# Patient Record
Sex: MALE | Race: WHITE | URBAN - METROPOLITAN AREA
[De-identification: names, ages, dates, MRNs, and addresses within clinical notes are randomized per-mention and may not be internally consistent; named-entity substitution may affect disease eponyms.]

---

## 2017-01-06 ENCOUNTER — OFFICE VISIT (OUTPATIENT)
Dept: FAMILY MEDICINE CLINIC | Age: 47
End: 2017-01-06

## 2017-01-06 VITALS
SYSTOLIC BLOOD PRESSURE: 110 MMHG | OXYGEN SATURATION: 97 % | TEMPERATURE: 95 F | HEART RATE: 96 BPM | WEIGHT: 138 LBS | RESPIRATION RATE: 18 BRPM | DIASTOLIC BLOOD PRESSURE: 85 MMHG

## 2017-01-06 DIAGNOSIS — G80.9 CEREBRAL PALSY, UNSPECIFIED TYPE (HCC): Primary | ICD-10-CM

## 2017-01-06 DIAGNOSIS — G40.909 SEIZURE DISORDER (HCC): ICD-10-CM

## 2017-01-06 NOTE — MR AVS SNAPSHOT
Visit Information Date & Time Provider Department Dept. Phone Encounter #  
 1/6/2017  3:30 PM Nichol Kim MD [address] [place] [address] [phone] [id number] Follow-up Instructions Return in about 3 months (around 4/6/2017). Follow-up and Disposition History Upcoming Health Maintenance Date Due DTaP/Tdap/Td series (1 - Tdap) 11/11/1991 Allergies as of 1/6/2017  Review Complete On: 1/6/2017 By: Nichol Kim MD  
  
 Severity Noted Reaction Type Reactions Avelox [Moxifloxacin]  07/01/2016    Seizures Lamictal [Lamotrigine]  07/01/2016    Rash Lidocaine  07/01/2016    Other (comments) Lyrica [Pregabalin]  07/01/2016    Other (comments) Valproic Acid  07/01/2016    Other (comments) Current Immunizations  Never Reviewed Name Date Influenza High Dose Vaccine PF 9/7/2016 11:21 AM  
  
 Not reviewed this visit You Were Diagnosed With   
  
 Codes Comments Cerebral palsy, unspecified type ([facility]    -  Primary ICD-10-CM: G80.9 ICD-9-CM: 343.9 Seizure disorder ([facility].)     ICD-10-CM: G40.909 ICD-9-CM: 345.90 Vitals BP Pulse Temp Resp Weight(growth percentile) SpO2  
 110/85 (BP 1 Location: Right arm, BP Patient Position: At rest) 96 95 °F (35 °C) (Axillary) 18 138 lb (62.6 kg) 97% Smoking Status Never Smoker Preferred Pharmacy Pharmacy Name Phone [address] [place], [address] [place] [address] Your Updated Medication List  
  
   
This list is accurate as of: 1/6/17  3:44 PM.  Always use your most recent med list.  
  
  
  
  
 BABY OIL (MINERAL OIL) EX  
by Apply Externally route. baclofen 20 mg tablet Commonly known as:  LIORESAL Take 20 mg by mouth three (3) times daily. calcitonin (salmon) nasal  
Commonly known as:  MIACALCIN  
1 [place] by IntraNASal route daily. calcium-vitamin D 500 mg(1,250mg) -200 unit per tablet Commonly known as:  OYSTER SHELL Take 1 Tab by mouth two (2) times daily (with meals). diazePAM 10 mg tablet Commonly known as:  VALIUM Take 1 hour before dental procedure  
  
 folic acid-vit E0-CBR B41 2.5-25-2 mg tablet Commonly known as:  Bernett Nissen Take 1 Tab by mouth daily. lacosamide 200 mg Tab tablet Commonly known as:  VIMPAT Take 200 mg by mouth two (2) times a day. levETIRAcetam 100 mg/mL solution Commonly known as:  KEPPRA Take 10 mg/kg by mouth two (2) times a day. LISTERINE MM  
by Mucous Membrane route. LORazepam 2 mg/mL concentrated solution Commonly known as:  INTENSOL Take 1 mg by mouth every eight (8) hours as needed for Anxiety. Menthol-Zinc Oxide 0.44-20.6 % Oint Commonly known as:  Calmoseptine Apply  to affected area. multivit-min-FA-lycopen-lutein 0.4-300-250 mg-mcg-mcg Tab Commonly known as:  CENTRUM SILVER Take 1 Tab by mouth daily. Crushed via G-tube OTHER Stander for spine alignment Strap for toe straightening  
  
 phenytoin 50 mg chewable tablet Commonly known as:  DILANTIN Take  by mouth three (3) times daily. sorbitol 70 % solution Take 15 mL by mouth daily as needed. THERAVITE PO Take  by mouth. Follow-up Instructions Return in about 3 months (around 4/6/2017). Introducing [facility]! Jordon Craig introduces [employer] patient portal. Now you can access parts of your medical record, email your doctor's office, and request medication refills online. 1. In your internet browser, go to [email or web address] 2. Click on the First Time User? Click Here link in the Sign In box. You will see the New Member Sign Up page. 3. Enter your [employer] Access Code exactly as it appears below. You will not need to use this code after youve completed the sign-up process. If you do not sign up before the expiration date, you must request a new code. · [employer] Access Code: [email or web address] Expires: 4/6/2017  3:44 PM 
 
4. Enter the last four digits of your Social Security Number (xxxx) and Date of Birth (mm/dd/yyyy) as indicated and click Submit. You will be taken to the next sign-up page. 5. Create a [employer] ID. This will be your [employer] login ID and cannot be changed, so think of one that is secure and easy to remember. 6. Create a [employer] password. You can change your password at any time. 7. Enter your Password Reset Question and Answer. This can be used at a later time if you forget your password. 8. Enter your e-mail address. You will receive e-mail notification when new information is available in [address] 9. Click Sign Up. You can now view and download portions of your medical record. 10. Click the Download Summary menu link to download a portable copy of your medical information. If you have questions, please visit the Frequently Asked Questions section of the [employer] website. Remember, [employer] is NOT to be used for urgent needs. For medical emergencies, dial 911. Now available from your iPhone and Android! Please provide this summary of care documentation to your next provider. If you have any questions after today's visit, please call [phone].

## 2017-01-06 NOTE — PROGRESS NOTES
HISTORY OF PRESENT ILLNESS  Ryan Landers is a 55 y.o. male. HPI  CP stable   seizures stable  Review of Systems   Neurological: Positive for focal weakness and seizures. All other systems reviewed and are negative. Past Medical History   Diagnosis Date    Cerebral palsy ([address]     Scoliosis     Seizures ([facility]     Thyroid disease      Current Outpatient Prescriptions on File Prior to Visit   Medication Sig Dispense Refill    diazepam (VALIUM) 10 mg tablet Take 1 hour before dental procedure 1 Tab 0    OTHER Stander for spine alignment  Strap for toe straightening 1 Each 0    multivit-min-FA-lycopen-lutein (CENTRUM SILVER) 0.4-300-250 mg-mcg-mcg tab Take 1 Tab by mouth daily. Crushed via G-tube 100 Tab 3    levETIRAcetam (KEPPRA) 100 mg/mL solution Take 10 mg/kg by mouth two (2) times a day.  MULTIVITAMIN,THERAPEUTIC (THERAVITE PO) Take  by mouth.  sorbitol 70 % solution Take 15 mL by mouth daily as needed.  phenytoin (DILANTIN) 50 mg tablet Take  by mouth three (3) times daily.  baclofen (LIORESAL) 20 mg tablet Take 20 mg by mouth three (3) times daily.  calcium-vitamin D (OYSTER SHELL) 500 mg(1,250mg) -200 unit per tablet Take 1 Tab by mouth two (2) times daily (with meals).  folic acid-vit X7-J A57 (FOLTX) 2.5-25-2 mg tablet Take 1 Tab by mouth daily.  calcitonin, salmon, (MIACALCIN) nasal 1 [place] by IntraNASal route daily.  lacosamide (VIMPAT) 200 mg tab tablet Take 200 mg by mouth two (2) times a day.  LORazepam (INTENSOL) 2 mg/mL concentrated solution Take 1 mg by mouth every eight (8) hours as needed for Anxiety.  Menthol-Zinc Oxide (CALMOSEPTINE) 0.44-20.6 % oint Apply  to affected area.  EUCALYP/ME-SALICYLATE/MEN/THYM (LISTERINE MM) by Mucous Membrane route.  BABY OIL, MINERAL OIL, EX by Apply Externally route. No current facility-administered medications on file prior to visit.       Visit Vitals    /85 (BP 1 Location: Right arm, BP Patient Position: At rest)    Pulse 96    Temp 95 °F (35 °C) (Axillary)    Resp 18    Wt 138 lb (62.6 kg)    SpO2 97%         Physical Exam   Constitutional: He appears well-developed and well-nourished. No distress. Musculoskeletal: He exhibits deformity. He exhibits no edema or tenderness. Neurological: He displays abnormal reflex. A cranial nerve deficit is present. He exhibits abnormal muscle tone. Chronic neurologic deficits   Skin: He is not diaphoretic. Vitals reviewed.       ASSESSMENT and PLAN  CP   Seizures  Plan  Continue current rx  Recheck in 3 months

## 2017-02-23 ENCOUNTER — TELEPHONE (OUTPATIENT)
Dept: FAMILY MEDICINE CLINIC | Age: 47
End: 2017-02-23

## 2017-04-04 ENCOUNTER — TELEPHONE (OUTPATIENT)
Dept: FAMILY MEDICINE CLINIC | Age: 47
End: 2017-04-04

## 2017-04-04 ENCOUNTER — OFFICE VISIT (OUTPATIENT)
Dept: FAMILY MEDICINE CLINIC | Age: 47
End: 2017-04-04

## 2017-04-04 VITALS
HEART RATE: 85 BPM | SYSTOLIC BLOOD PRESSURE: 96 MMHG | RESPIRATION RATE: 20 BRPM | TEMPERATURE: 97.7 F | OXYGEN SATURATION: 95 % | DIASTOLIC BLOOD PRESSURE: 64 MMHG | WEIGHT: 126.8 LBS

## 2017-04-04 DIAGNOSIS — R56.9 SEIZURES (HCC): Primary | ICD-10-CM

## 2017-04-04 DIAGNOSIS — E03.9 ACQUIRED HYPOTHYROIDISM: ICD-10-CM

## 2017-04-04 RX ORDER — DIAZEPAM 10 MG/1
TABLET ORAL
Qty: 1 TAB | Refills: 3 | Status: SHIPPED | OUTPATIENT
Start: 2017-04-04 | End: 2017-04-04 | Stop reason: SDUPTHER

## 2017-04-04 RX ORDER — LEVOTHYROXINE SODIUM 25 UG/1
TABLET ORAL
COMMUNITY
End: 2017-09-26 | Stop reason: SDUPTHER

## 2017-04-04 RX ORDER — DIAZEPAM 10 MG/1
TABLET ORAL
Qty: 1 TAB | Refills: 3 | Status: SHIPPED | OUTPATIENT
Start: 2017-04-04 | End: 2017-09-22 | Stop reason: SDUPTHER

## 2017-04-04 NOTE — MR AVS SNAPSHOT
Visit Information Date & Time Provider Department Dept. Phone Encounter #  
 4/4/2017  2:30 PM Trace Doe, 3 [facility] [phone] [id number] Follow-up Instructions Return in about 3 months (around 7/4/2017). Follow-up and Disposition History Upcoming Health Maintenance Date Due DTaP/Tdap/Td series (1 - Tdap) 11/11/1991 Allergies as of 4/4/2017  Review Complete On: 4/4/2017 By: Trace Doe MD  
  
 Severity Noted Reaction Type Reactions Avelox [Moxifloxacin]  07/01/2016    Seizures Lamictal [Lamotrigine]  07/01/2016    Rash Lidocaine  07/01/2016    Other (comments) Lyrica [Pregabalin]  07/01/2016    Other (comments) Valproic Acid  07/01/2016    Other (comments) Current Immunizations  Never Reviewed Name Date Influenza High Dose Vaccine PF 9/7/2016 11:21 AM  
  
 Not reviewed this visit You Were Diagnosed With   
  
 Codes Comments Seizures ([facility]    -  Primary ICD-10-CM: R56.9 ICD-9-CM: 780.39 Acquired hypothyroidism     ICD-10-CM: E03.9 ICD-9-CM: 393. 9 Vitals BP Pulse Temp Resp Weight(growth percentile) SpO2  
 96/64 85 97.7 °F (36.5 °C) 20 126 lb 12.8 oz (57.5 kg) 95% Smoking Status Never Smoker Vitals History Preferred Pharmacy Pharmacy Name Phone [address] [place], [address] [place] [address] Your Updated Medication List  
  
   
This list is accurate as of: 4/4/17  3:23 PM.  Always use your most recent med list.  
  
  
  
  
 BABY OIL (MINERAL OIL) EX  
by Apply Externally route. baclofen 20 mg tablet Commonly known as:  LIORESAL Take 20 mg by mouth three (3) times daily. calcitonin (salmon) nasal  
Commonly known as:  MIACALCIN  
1 [place] by IntraNASal route daily. calcium-vitamin D 500 mg(1,250mg) -200 unit per tablet Commonly known as:  OYSTER SHELL  
 Take 1 Tab by mouth two (2) times daily (with meals). diazePAM 10 mg tablet Commonly known as:  VALIUM Take 1 hour before dental procedure  
  
 folic acid-vit S1-MJB O74 2.5-25-2 mg tablet Commonly known as:  [place] Deacon Take 1 Tab by mouth daily. lacosamide 200 mg Tab tablet Commonly known as:  VIMPAT Take 200 mg by mouth two (2) times a day. levETIRAcetam 100 mg/mL solution Commonly known as:  KEPPRA Take 10 mg/kg by mouth two (2) times a day. levothyroxine 25 mcg tablet Commonly known as:  SYNTHROID Take  by mouth Daily (before breakfast). LISTERINE MM  
by Mucous Membrane route. LORazepam 2 mg/mL concentrated solution Commonly known as:  INTENSOL Take 1 mg by mouth every eight (8) hours as needed for Anxiety. Menthol-Zinc Oxide 0.44-20.6 % Oint Commonly known as:  Calmoseptine Apply  to affected area. multivit-min-FA-lycopen-lutein 0.4-300-250 mg-mcg-mcg Tab Commonly known as:  CENTRUM SILVER Take 1 Tab by mouth daily. Crushed via G-tube * OTHER Stander for spine alignment Strap for toe straightening * OTHER  
ACO as needed  
  
 phenytoin 50 mg chewable tablet Commonly known as:  DILANTIN Take  by mouth three (3) times daily. sorbitol 70 % solution Take 15 mL by mouth daily as needed. THERAVITE PO Take  by mouth. * Notice: This list has 2 medication(s) that are the same as other medications prescribed for you. Read the directions carefully, and ask your doctor or other care provider to review them with you. Prescriptions Printed Refills  
 diazePAM (VALIUM) 10 mg tablet 3 Sig: Take 1 hour before dental procedure Class: Print Follow-up Instructions Return in about 3 months (around 7/4/2017). Introducing [facility]!    
 Beto Henao introduces [employer] patient portal. Now you can access parts of your medical record, email your doctor's office, and request medication refills online. 1. In your internet browser, go to [email or web address] 2. Click on the First Time User? Click Here link in the Sign In box. You will see the New Member Sign Up page. 3. Enter your [employer] Access Code exactly as it appears below. You will not need to use this code after youve completed the sign-up process. If you do not sign up before the expiration date, you must request a new code. · [employer] Access Code: [email or web address] Expires: 4/6/2017  4:44 PM 
 
4. Enter the last four digits of your Social Security Number (xxxx) and Date of Birth (mm/dd/yyyy) as indicated and click Submit. You will be taken to the next sign-up page. 5. Create a [employer] ID. This will be your [employer] login ID and cannot be changed, so think of one that is secure and easy to remember. 6. Create a [employer] password. You can change your password at any time. 7. Enter your Password Reset Question and Answer. This can be used at a later time if you forget your password. 8. Enter your e-mail address. You will receive e-mail notification when new information is available in [address] 9. Click Sign Up. You can now view and download portions of your medical record. 10. Click the Download Summary menu link to download a portable copy of your medical information. If you have questions, please visit the Frequently Asked Questions section of the [employer] website. Remember, [employer] is NOT to be used for urgent needs. For medical emergencies, dial 911. Now available from your iPhone and Android! Please provide this summary of care documentation to your next provider. Your primary care clinician is listed as Racquel Duncan. If you have any questions after today's visit, please call [phone].

## 2017-04-04 NOTE — PROGRESS NOTES
HISTORY OF PRESENT ILLNESS  Adolfo Cisneros is a 55 y.o. male. HPI  Seizures stable  Hypothyroidism stable  Review of Systems   Neurological: Positive for seizures. All other systems reviewed and are negative. Past Medical History:   Diagnosis Date    Cerebral palsy ([address]     Scoliosis     Se[facility]ures ([facility]     Thyroid disease      Current Outpatient Prescriptions on File Prior to Visit   Medication Sig Dispense Refill    OTHER ACO as needed 1 Each 0    OTHER Stander for spine alignment  Strap for toe straightening 1 Each 0    multivit-min-FA-lycopen-lutein (CENTRUM SILVER) 0.4-300-250 mg-mcg-mcg tab Take 1 Tab by mouth daily. Crushed via G-tube 100 Tab 3    levETIRAcetam (KEPPRA) 100 mg/mL solution Take 10 mg/kg by mouth two (2) times a day.  MULTIVITAMIN,THERAPEUTIC (THERAVITE PO) Take  by mouth.  sorbitol 70 % solution Take 15 mL by mouth daily as needed.  phenytoin (DILANTIN) 50 mg tablet Take  by mouth three (3) times daily.  baclofen (LIORESAL) 20 mg tablet Take 20 mg by mouth three (3) times daily.  calcium-vitamin D (OYSTER SHELL) 500 mg(1,250mg) -200 unit per tablet Take 1 Tab by mouth two (2) times daily (with meals).  folic acid-vit N8-VUP R72 (FOLTX) 2.5-25-2 mg tablet Take 1 Tab by mouth daily.  calcitonin, salmon, (MIACALCIN) nasal 1 [place] by IntraNASal route daily.  lacosamide (VIMPAT) 200 mg tab tablet Take 200 mg by mouth two (2) times a day.  LORazepam (INTENSOL) 2 mg/mL concentrated solution Take 1 mg by mouth every eight (8) hours as needed for Anxiety.  Menthol-Zinc Oxide (CALMOSEPTINE) 0.44-20.6 % oint Apply  to affected area.  EUCALYP/ME-SALICYLATE/MEN/THYM (LISTERINE MM) by Mucous Membrane route.  BABY OIL, MINERAL OIL, EX by Apply Externally route.  diazepam (VALIUM) 10 mg tablet Take 1 hour before dental procedure 1 Tab 0     No current facility-administered medications on file prior to visit.       Visit Vitals    BP 96/64    Pulse 85    Temp 97.7 °F (36.5 °C)    Resp 20    Wt 126 lb 12.8 oz (57.5 kg)    SpO2 95%         Physical Exam   Neurological: He displays abnormal reflex. A cranial nerve deficit is present. He exhibits abnormal muscle tone. Coordination abnormal.   Non-communicative   Psychiatric: His behavior is normal. Judgment and thought content normal.   Flattened affect  lethargic   Vitals reviewed.       ASSESSMENT and PLAN  seizures   Thyroid  Plan  Refills  Recheck in 3 months

## 2017-04-04 NOTE — PROGRESS NOTES
Stephen Parker is a 55 y.o. male here today for 3 month follow-up          1. Have you been to the ER, urgent care clinic since your last visit? Hospitalized since your last visit? No    2. Have you seen or consulted any other health care providers outside of the [facility] since your last visit? Include any pap smears or colon screening.  No

## 2017-05-30 ENCOUNTER — TELEPHONE (OUTPATIENT)
Dept: FAMILY MEDICINE CLINIC | Age: 47
End: 2017-05-30

## 2017-05-30 NOTE — TELEPHONE ENCOUNTER
Shelia, A nurse from the facility the pt lives at called to notify the doctor that the pt missed his 2pm dose of baclofen yesterday. They are requesting a letter be faxed over stating the physician was notified.     [facility]:[phone]  Fax [phone]

## 2017-06-15 ENCOUNTER — TELEPHONE (OUTPATIENT)
Dept: FAMILY MEDICINE CLINIC | Age: 47
End: 2017-06-15

## 2017-06-15 NOTE — TELEPHONE ENCOUNTER
Dharmesh Ontiveros from [facility] called to inform Dr. Roro Jeong that the pt woke up today with his right knee very swollen, red, and hot to the touch. Dharmesh Ontiveros reports that there were no irregularities with either leg yesterday. She states the right knee is almost double the size of the left knee    No fever, Temp was 98.6, /74, Pulse 80 as of 9:45am    They are taking the pt to the ER to be evaluated and will call after to update Dr. Roro Jeong.

## 2017-07-27 ENCOUNTER — TELEPHONE (OUTPATIENT)
Dept: FAMILY MEDICINE CLINIC | Age: 47
End: 2017-07-27

## 2017-07-27 DIAGNOSIS — Z00.00 ROUTINE GENERAL MEDICAL EXAMINATION AT A HEALTH CARE FACILITY: Primary | ICD-10-CM

## 2017-07-27 NOTE — TELEPHONE ENCOUNTER
Pt has an appt tomorrow and is requesting bw be ordered before their appt. Please review and order accordingly.

## 2017-07-28 ENCOUNTER — OFFICE VISIT (OUTPATIENT)
Dept: FAMILY MEDICINE CLINIC | Age: 47
End: 2017-07-28

## 2017-07-28 VITALS
TEMPERATURE: 98 F | OXYGEN SATURATION: 93 % | DIASTOLIC BLOOD PRESSURE: 75 MMHG | SYSTOLIC BLOOD PRESSURE: 115 MMHG | RESPIRATION RATE: 22 BRPM | HEART RATE: 70 BPM

## 2017-07-28 DIAGNOSIS — G80.1 SPASTIC DIPLEGIC CEREBRAL PALSY (HCC): Primary | ICD-10-CM

## 2017-07-28 DIAGNOSIS — E03.9 ACQUIRED HYPOTHYROIDISM: ICD-10-CM

## 2017-07-28 DIAGNOSIS — G40.909 SEIZURE DISORDER (HCC): ICD-10-CM

## 2017-07-28 NOTE — MR AVS SNAPSHOT
Visit Information Date & Time Provider Department Dept. Phone Encounter #  
 7/28/2017  9:30 AM Cristine Lyles, 3 [facility] [phone] [id number] Follow-up Instructions Return in about 3 months (around 10/28/2017). Follow-up and Disposition History Your Appointments 10/13/2017  3:00 PM  
Follow Up with Cristine Lyles MD  
3 [facility]) Appt Note: 6 month f/u; FAMILY MEMBER RESCHEDULED [address] [facility] [id number] [phone]  
  
   
 [address] [facility] [address] [facility] Upcoming Health Maintenance Date Due DTaP/Tdap/Td series (1 - Tdap) 11/11/1991 INFLUENZA AGE 9 TO ADULT 8/1/2017 Allergies as of 7/28/2017  Review Complete On: 7/28/2017 By: Cristine Lyles MD  
  
 Severity Noted Reaction Type Reactions Avelox [Moxifloxacin]  07/01/2016    Seizures Lamictal [Lamotrigine]  07/01/2016    Rash Lidocaine  07/01/2016    Other (comments) Lyrica [Pregabalin]  07/01/2016    Other (comments) Valproic Acid  07/01/2016    Other (comments) Current Immunizations  Never Reviewed Name Date Influenza High Dose Vaccine PF 9/7/2016 11:21 AM  
  
 Not reviewed this visit You Were Diagnosed With   
  
 Codes Comments Spastic diplegic cerebral palsy (HCC)    -  Primary ICD-10-CM: G80.1 ICD-9-CM: 343.0 [facility]iz[facility] ([facility]     ICD-10-CM: G40.909 ICD-9-CM: 345.90 Acquired hypothyroidism     ICD-10-CM: E03.9 ICD-9-CM: 027. 9 Vitals BP Pulse Temp Resp SpO2 Smoking Status 115/75 (BP 1 Location: Right arm, BP Patient Position: Sitting) 70 98 °F (36.7 °C) (Oral) 22 93% Never Smoker Preferred Pharmacy Pharmacy Name Phone [address], [address] [facility] Your Updated Medication List  
  
   
 This list is accurate as of: 7/28/17 10:34 AM.  Always use your most recent med list.  
  
  
  
  
 BABY OIL (MINERAL OIL) EX  
by Apply Externally route. baclofen 20 mg tablet Commonly known as:  LIORESAL Take 20 mg by mouth three (3) times daily. calcitonin (salmon) nasal  
Commonly known as:  MIACALCIN  
1 [place] by IntraNASal route daily. calcium-vitamin D 500 mg(1,250mg) -200 unit per tablet Commonly known as:  OYSTER SHELL Take 1 Tab by mouth two (2) times daily (with meals). diazePAM 10 mg tablet Commonly known as:  VALIUM Take 1 hour before dental procedure Crushed and administered through G-tube  
  
 folic acid-vit R3-DHB S16 2.5-25-2 mg tablet Commonly known as:  [place] Take 1 Tab by mouth daily. lacosamide 200 mg Tab tablet Commonly known as:  VIMPAT Take 200 mg by mouth two (2) times a day. levETIRAcetam 100 mg/mL solution Commonly known as:  KEPPRA Take 10 mg/kg by mouth two (2) times a day. levothyroxine 25 mcg tablet Commonly known as:  SYNTHROID Take  by mouth Daily (before breakfast). LISTERINE MM  
by Mucous Membrane route. LORazepam 2 mg/mL concentrated solution Commonly known as:  INTENSOL Take 1 mg by mouth every eight (8) hours as needed for Anxiety. Menthol-Zinc Oxide 0.44-20.6 % Oint Commonly known as:  Calmoseptine Apply  to affected area. multivit-min-FA-lycopen-lutein 0.4-300-250 mg-mcg-mcg Tab Commonly known as:  CENTRUM SILVER Take 1 Tab by mouth daily. Crushed via G-tube * OTHER Stander for spine alignment Strap for toe straightening * OTHER  
ACO as needed  
  
 phenytoin 50 mg chewable tablet Commonly known as:  DILANTIN Take  by mouth three (3) times daily. sorbitol 70 % solution Take 15 mL by mouth daily as needed. THERAVITE PO Take  by mouth. * Notice:   This list has 2 medication(s) that are the same as other medications prescribed for you. Read the directions carefully, and ask your doctor or other care provider to review them with you. Follow-up Instructions Return in about 3 months (around 10/28/2017). Introducing [facility] [place]! [employer] introduces [employer] patient portal. Now you can access parts of your medical record, email your doctor's office, and request medication refills online. 1. In your internet browser, go to [email or web address] 2. Click on the First Time User? Click Here link in the Sign In box. You will see the New Member Sign Up page. 3. Enter your [employer] Access Code exactly as it appears below. You will not need to use this code after youve completed the sign-up process. If you do not sign up before the expiration date, you must request a new code. · [employer] Access Code: [email or web address] Expires: 10/26/2017 10:34 AM 
 
4. Enter the last four digits of your Social Security Number (xxxx) and Date of Birth (mm/dd/yyyy) as indicated and click Submit. You will be taken to the next sign-up page. 5. Create a [employer] ID. This will be your [employer] login ID and cannot be changed, so think of one that is secure and easy to remember. 6. Create a [employer] password. You can change your password at any time. 7. Enter your Password Reset Question and Answer. This can be used at a later time if you forget your password. 8. Enter your e-mail address. You will receive e-mail notification when new information is available in [address] 9. Click Sign Up. You can now view and download portions of your medical record. 10. Click the Download Summary menu link to download a portable copy of your medical information. If you have questions, please visit the Frequently Asked Questions section of the [employer] website. Remember, [employer] is NOT to be used for urgent needs. For medical emergencies, dial 911. Now available from your iPhone and Android! Please provide this summary of care documentation to your next provider. Your primary care clinician is listed as Render So. If you have any questions after today's visit, please call [phone].

## 2017-07-28 NOTE — PROGRESS NOTES
Subjective:     Vitor Perez is a 55 y.o. male presenting for annual exam and complete physical.    There is no problem list on file for this patient. There are no active problems to display for this patient. Current Outpatient Prescriptions   Medication Sig Dispense Refill    levothyroxine (SYNTHROID) 25 mcg tablet Take  by mouth Daily (before breakfast).  diazePAM (VALIUM) 10 mg tablet Take 1 hour before dental procedure  Crushed and administered through G-tube 1 Tab 3    OTHER ACO as needed 1 Each 0    OTHER Stander for spine alignment  Strap for toe straightening 1 Each 0    multivit-min-FA-lycopen-lutein (CENTRUM SILVER) 0.4-300-250 mg-mcg-mcg tab Take 1 Tab by mouth daily. Crushed via G-tube 100 Tab 3    levETIRAcetam (KEPPRA) 100 mg/mL solution Take 10 mg/kg by mouth two (2) times a day.  MULTIVITAMIN,THERAPEUTIC (THERAVITE PO) Take  by mouth.  sorbitol 70 % solution Take 15 mL by mouth daily as needed.  phenytoin (DILANTIN) 50 mg tablet Take  by mouth three (3) times daily.  baclofen (LIORESAL) 20 mg tablet Take 20 mg by mouth three (3) times daily.  calcium-vitamin D (OYSTER SHELL) 500 mg(1,250mg) -200 unit per tablet Take 1 Tab by mouth two (2) times daily (with meals).  folic acid-vit S8-JXA E07 (FOLTX) 2.5-25-2 mg tablet Take 1 Tab by mouth daily.  calcitonin, salmon, (MIACALCIN) nasal 1 [place] by IntraNASal route daily.  lacosamide (VIMPAT) 200 mg tab tablet Take 200 mg by mouth two (2) times a day.  LORazepam (INTENSOL) 2 mg/mL concentrated solution Take 1 mg by mouth every eight (8) hours as needed for Anxiety.  Menthol-Zinc Oxide (CALMOSEPTINE) 0.44-20.6 % oint Apply  to affected area.  EUCALYP/ME-SALICYLATE/MEN/THYM (LISTERINE MM) by Mucous Membrane route.  BABY OIL, MINERAL OIL, EX by Apply Externally route.        Allergies   Allergen Reactions    Avelox [Moxifloxacin] Seizures    Lamictal [Lamotrigine] Rash    Lidocaine Other (comments)    Lyrica [Pregabalin] Other (comments)    Valproic Acid Other (comments)     Past Medical History:   Diagnosis Date    Cerebral palsy ([address]     Scoliosis     Seizures ([address]     Thyroid disease      Past Surgical History:   Procedure Laterality Date    ABDOMEN SURGERY PROC UNLISTED      g-tube    HX ORTHOPAEDIC      foot surgery     No family history on file. Social History   Substance Use Topics    Smoking status: Never Smoker    Smokeless tobacco: Never Used    Alcohol use No        to be done     Review of Systems  A comprehensive review of systems was negative except for: Neurological: positive for chronic neurologic dysfunction    Objective:     Visit Vitals    /75 (BP 1 Location: Right arm, BP Patient Position: Sitting)    Pulse 70    Temp 98 °F (36.7 °C) (Oral)    Resp 22    SpO2 93%     Physical exam:   General appearance - chronically ill  Mental status - non-communicative  Eyes - sclera anicteric  Mouth - mucous membranes moist, pharynx normal without lesions and tongue normal  Lymphatics - no palpable lymphadenopathy  Chest - clear to auscultation, no wheezes, rales or rhonchi, symmetric air entry  Heart - normal rate, regular rhythm, normal S1, S2, no murmurs, rubs, clicks or gallops  Abdomen - soft, nontender, nondistended, no masses or organomegaly  Neurological - abnormal neurological exam unchanged from prior examinations  Musculoskeletal - contractures chronic  Extremities - peripheral pulses normal, no pedal edema, no clubbing or cyanosis, non-use atrophy     Assessment/Plan:     Routine exam  continue present plan, routine labs ordered. current treatment plan is effective, no change in therapy.

## 2017-07-28 NOTE — PROGRESS NOTES
Chief Complaint   Patient presents with    Physical     Pain scale 0/10        1. Have you been to the ER, urgent care clinic since your last visit? Hospitalized since your last visit? No    2. Have you seen or consulted any other health care providers outside of the [address] [place] [address] since your last visit? Include any pap smears or colon screening.  Yes Dr. Keira hicks

## 2017-07-31 ENCOUNTER — TELEPHONE (OUTPATIENT)
Dept: FAMILY MEDICINE CLINIC | Age: 47
End: 2017-07-31

## 2017-07-31 NOTE — TELEPHONE ENCOUNTER
Matt Christiansen a nurse from [facility] (?) called in regards to the pt, he is trying to to go to a dentist facility that they have and they are requiring him to do blood work before they schedule him an appt. They are requesting these labs to be added on if they are not already ordered; CMP to include electrolytes and liver function, thyroid function, hemoglobin and hematocrit (?)  Please advise and sarah them when orders are added on.

## 2017-08-09 ENCOUNTER — HOSPITAL ENCOUNTER (OUTPATIENT)
Dept: LAB | Age: 47
Discharge: HOME | End: 2017-08-09
Payer: MEDICAID

## 2017-08-09 DIAGNOSIS — Z00.00 ROUTINE GENERAL MEDICAL EXAMINATION AT A HEALTH CARE FACILITY: ICD-10-CM

## 2017-08-09 LAB
ALBUMIN SERPL BCP-MCNC: 3.5 G/DL (ref 3.4–5)
ALBUMIN/GLOB SERPL: 1 {RATIO} (ref 0.8–1.7)
ALP SERPL-CCNC: 104 U/L (ref 45–117)
ALT SERPL-CCNC: 31 U/L (ref 16–61)
ANION GAP BLD CALC-SCNC: 6 MMOL/L (ref 3–18)
AST SERPL W P-5'-P-CCNC: 20 U/L (ref 15–37)
BASOPHILS # BLD AUTO: 0 K/UL (ref 0–0.06)
BASOPHILS # BLD: 0 % (ref 0–2)
BILIRUB SERPL-MCNC: 0.2 MG/DL (ref 0.2–1)
BUN SERPL-MCNC: 13 MG/DL (ref 7–18)
BUN/CREAT SERPL: 15 (ref 12–20)
CALCIUM SERPL-MCNC: 8.4 MG/DL (ref 8.5–10.1)
CHLORIDE SERPL-SCNC: 106 MMOL/L (ref 100–108)
CHOLEST SERPL-MCNC: 129 MG/DL
CO2 SERPL-SCNC: 30 MMOL/L (ref 21–32)
CREAT SERPL-MCNC: 0.88 MG/DL (ref 0.6–1.3)
DIFFERENTIAL METHOD BLD: ABNORMAL
EOSINOPHIL # BLD: 0.2 K/UL (ref 0–0.4)
EOSINOPHIL NFR BLD: 5 % (ref 0–5)
ERYTHROCYTE [DISTWIDTH] IN BLOOD BY AUTOMATED COUNT: 13.4 % (ref 11.6–14.5)
GLOBULIN SER CALC-MCNC: 3.5 G/DL (ref 2–4)
GLUCOSE SERPL-MCNC: 84 MG/DL (ref 74–99)
HCT VFR BLD AUTO: 47.8 % (ref 36–48)
HDLC SERPL-MCNC: 63 MG/DL (ref 40–60)
HDLC SERPL: 2 {RATIO} (ref 0–5)
HGB BLD-MCNC: 15.7 G/DL (ref 13–16)
LDLC SERPL CALC-MCNC: 56.4 MG/DL (ref 0–100)
LIPID PROFILE,FLP: ABNORMAL
LYMPHOCYTES # BLD AUTO: 40 % (ref 21–52)
LYMPHOCYTES # BLD: 1.8 K/UL (ref 0.9–3.6)
MCH RBC QN AUTO: 31.5 PG (ref 24–34)
MCHC RBC AUTO-ENTMCNC: 32.8 G/DL (ref 31–37)
MCV RBC AUTO: 96 FL (ref 74–97)
MONOCYTES # BLD: 0.4 K/UL (ref 0.05–1.2)
MONOCYTES NFR BLD AUTO: 8 % (ref 3–10)
NEUTS SEG # BLD: 2.1 K/UL (ref 1.8–8)
NEUTS SEG NFR BLD AUTO: 47 % (ref 40–73)
PLATELET # BLD AUTO: 128 K/UL (ref 135–420)
PMV BLD AUTO: 12 FL (ref 9.2–11.8)
POTASSIUM SERPL-SCNC: 4.6 MMOL/L (ref 3.5–5.5)
PROT SERPL-MCNC: 7 G/DL (ref 6.4–8.2)
PSA SERPL-MCNC: 2.6 NG/ML (ref 0–4)
RBC # BLD AUTO: 4.98 M/UL (ref 4.7–5.5)
SODIUM SERPL-SCNC: 142 MMOL/L (ref 136–145)
T4 FREE SERPL-MCNC: 1.1 NG/DL (ref 0.7–1.5)
TRIGL SERPL-MCNC: 48 MG/DL (ref ?–150)
TSH SERPL DL<=0.05 MIU/L-ACNC: 3.35 UIU/ML (ref 0.36–3.74)
VLDLC SERPL CALC-MCNC: 9.6 MG/DL
WBC # BLD AUTO: 4.5 K/UL (ref 4.6–13.2)

## 2017-08-09 PROCEDURE — 80053 COMPREHEN METABOLIC PANEL: CPT | Performed by: INTERNAL MEDICINE

## 2017-08-09 PROCEDURE — 80061 LIPID PANEL: CPT | Performed by: INTERNAL MEDICINE

## 2017-08-09 PROCEDURE — 84439 ASSAY OF FREE THYROXINE: CPT | Performed by: INTERNAL MEDICINE

## 2017-08-09 PROCEDURE — 84153 ASSAY OF PSA TOTAL: CPT | Performed by: INTERNAL MEDICINE

## 2017-08-09 PROCEDURE — 85025 COMPLETE CBC W/AUTO DIFF WBC: CPT | Performed by: INTERNAL MEDICINE

## 2017-08-09 PROCEDURE — 84443 ASSAY THYROID STIM HORMONE: CPT | Performed by: INTERNAL MEDICINE

## 2017-08-09 PROCEDURE — 36415 COLL VENOUS BLD VENIPUNCTURE: CPT | Performed by: INTERNAL MEDICINE

## 2017-08-10 RX ORDER — BACLOFEN 20 MG/1
TABLET ORAL
Qty: 90 TAB | Refills: 3 | Status: SHIPPED | OUTPATIENT
Start: 2017-08-10 | End: 2017-12-14 | Stop reason: SDUPTHER

## 2017-09-13 ENCOUNTER — OFFICE VISIT (OUTPATIENT)
Dept: FAMILY MEDICINE CLINIC | Age: 47
End: 2017-09-13

## 2017-09-13 DIAGNOSIS — G80.9 CEREBRAL PALSY, UNSPECIFIED TYPE (HCC): Primary | ICD-10-CM

## 2017-09-13 DIAGNOSIS — H10.31 ACUTE CONJUNCTIVITIS OF RIGHT EYE, UNSPECIFIED ACUTE CONJUNCTIVITIS TYPE: ICD-10-CM

## 2017-09-13 NOTE — PROGRESS NOTES
Chief Complaint   Patient presents with   [address]     patient went to [facility] urgent care     Pain scale 0/10      1. Have you been to the ER, urgent care clinic since your last visit? Hospitalized since your last visit? Yes Where: [facility] urgent care    2. Have you seen or consulted any other health care providers outside of the [address] [place] [address] since your last visit? Include any pap smears or colon screening.  No

## 2017-09-13 NOTE — PROGRESS NOTES
HISTORY OF PRESENT ILLNESS  Linette Mai is a 55 y.o. male. HPI  CP stable  Recent conjunctivitis  Completed rx  No uri symptoms  Review of Systems   Eyes: Positive for redness. All other systems reviewed and are negative. Past Medical History:   Diagnosis Date    Cerebral palsy ([address]     Scoliosis     Seizures (HCC)     Thyroid disease        Current Outpatient Prescriptions:     baclofen (LIORESAL) 20 mg tablet, Take 1 tablet per g-tube at 1400 for spasticity - crush and dissolve in H2O, Disp: 90 Tab, Rfl: 3    levothyroxine (SYNTHROID) 25 mcg tablet, Take  by mouth Daily (before breakfast). , Disp: , Rfl:     diazePAM (VALIUM) 10 mg tablet, Take 1 hour before dental procedure Crushed and administered through G-tube, Disp: 1 Tab, Rfl: 3    OTHER, ACO as needed, Disp: 1 Each, Rfl: 0    OTHER, Stander for spine alignment Strap for toe straightening, Disp: 1 Each, Rfl: 0    multivit-min-FA-lycopen-lutein (CENTRUM SILVER) 0.4-300-250 mg-mcg-mcg tab, Take 1 Tab by mouth daily. Crushed via G-tube, Disp: 100 Tab, Rfl: 3    levETIRAcetam (KEPPRA) 100 mg/mL solution, Take 10 mg/kg by mouth two (2) times a day., Disp: , Rfl:     MULTIVITAMIN,THERAPEUTIC (THERAVITE PO), Take  by mouth., Disp: , Rfl:     sorbitol 70 % solution, Take 15 mL by mouth daily as needed. , Disp: , Rfl:     phenytoin (DILANTIN) 50 mg tablet, Take  by mouth three (3) times daily. , Disp: , Rfl:     calcium-vitamin D (OYSTER SHELL) 500 mg(1,250mg) -200 unit per tablet, Take 1 Tab by mouth two (2) times daily (with meals). , Disp: , Rfl:     folic acid-vit R3-FJO H29 (FOLTX) 2.5-25-2 mg tablet, Take 1 Tab by mouth daily. , Disp: , Rfl:     calcitonin, salmon, (MIACALCIN) nasal, 1 [place] by IntraNASal route daily. , Disp: , Rfl:     lacosamide (VIMPAT) 200 mg tab tablet, Take 200 mg by mouth two (2) times a day., Disp: , Rfl:     LORazepam (INTENSOL) 2 mg/mL concentrated solution, Take 1 mg by mouth every eight (8) hours as needed for Anxiety. , Disp: , Rfl:     Menthol-Zinc Oxide (CALMOSEPTINE) 0.44-20.6 % oint, Apply  to affected area., Disp: , Rfl:     EUCALYP/ME-SALICYLATE/MEN/THYM (LISTERINE MM), by Mucous Membrane route., Disp: , Rfl:     BABY OIL, MINERAL OIL, EX, by Apply Externally route., Disp: , Rfl:   There were no vitals taken for this visit. Physical Exam   Constitutional: He appears well-developed and well-nourished. No distress. Eyes: Conjunctivae and EOM are normal. Pupils are equal, round, and reactive to light. Right eye exhibits no discharge. Left eye exhibits no discharge. No scleral icterus. Neurological: He displays abnormal reflex. No cranial nerve deficit. He exhibits abnormal muscle tone. Coordination abnormal.   Chronic neurologic changes stable   Skin: He is not diaphoretic. Vitals reviewed.       ASSESSMENT and PLAN  cp   Conjunctivitis resolved  Plan  Reassurance  Return  As planned

## 2017-09-13 NOTE — MR AVS SNAPSHOT
Visit Information Date & Time Provider Department Dept. Phone Encounter #  
 9/13/2017 11:45 AM Patrizia Berkowitz, 3 [facility] [phone] [id number] Follow-up Instructions Return as planned. Follow-up and Disposition History Your Appointments 10/23/2017 10:30 AM  
Follow Up with aPtrizia Berkowitz MD  
3 [facility]) Appt Note: 3 month f/u, flu vaccine [address] [facility] 220 2201 [facility] [address] [phone]  
  
   
 [address] [facility] 280 [facility] Upcoming Health Maintenance Date Due DTaP/Tdap/Td series (1 - Tdap) 11/11/1991 INFLUENZA AGE 9 TO ADULT 8/1/2017 Allergies as of 9/13/2017  Review Complete On: 9/13/2017 By: Patrizia Berkowitz MD  
  
 Severity Noted Reaction Type Reactions Avelox [Moxifloxacin]  07/01/2016    Seizures Lamictal [Lamotrigine]  07/01/2016    Rash Lidocaine  07/01/2016    Other (comments) Lyrica [Pregabalin]  07/01/2016    Other (comments) Valproic Acid  07/01/2016    Other (comments) Current Immunizations  Never Reviewed Name Date Influenza High Dose Vaccine PF 9/7/2016 11:21 AM  
  
 Not reviewed this visit You Were Diagnosed With   
  
 Codes Comments Cerebral palsy, unspecified type ([address]    -  Primary ICD-10-CM: G80.9 ICD-9-CM: 343.9 Acute conjunctivitis of right eye, unspecified acute conjunctivitis type     ICD-10-CM: H10.31 ICD-9-CM: 372.00 Vitals Smoking Status Never Smoker Preferred Pharmacy Pharmacy Name Phone [address] [place], [address] [place] [address] Your Updated Medication List  
  
   
This list is accurate as of: 9/13/17 12:07 PM.  Always use your most recent med list.  
  
  
  
  
 BABY OIL (MINERAL OIL) EX  
by Apply Externally route. baclofen 20 mg tablet Commonly known as:  LIORESAL Take 1 tablet per g-tube at 1400 for spasticity - crush and dissolve in H2O  
  
 calcitonin (salmon) nasal  
Commonly known as:  MIACALCIN  
1 [place] by IntraNASal route daily. calcium-vitamin D 500 mg(1,250mg) -200 unit per tablet Commonly known as:  OYSTER SHELL Take 1 Tab by mouth two (2) times daily (with meals). diazePAM 10 mg tablet Commonly known as:  VALIUM Take 1 hour before dental procedure Crushed and administered through G-tube  
  
 folic acid-vit Y6-JCS U13 2.5-25-2 mg tablet Commonly known as:  Mariza Sweetie Take 1 Tab by mouth daily. lacosamide 200 mg Tab tablet Commonly known as:  VIMPAT Take 200 mg by mouth two (2) times a day. levETIRAcetam 100 mg/mL solution Commonly known as:  KEPPRA Take 10 mg/kg by mouth two (2) times a day. levothyroxine 25 mcg tablet Commonly known as:  SYNTHROID Take  by mouth Daily (before breakfast). LISTERINE MM  
by Mucous Membrane route. LORazepam 2 mg/mL concentrated solution Commonly known as:  INTENSOL Take 1 mg by mouth every eight (8) hours as needed for Anxiety. Menthol-Zinc Oxide 0.44-20.6 % Oint Commonly known as:  Calmoseptine Apply  to affected area. multivit-min-FA-lycopen-lutein 0.4-300-250 mg-mcg-mcg Tab Commonly known as:  CENTRUM SILVER Take 1 Tab by mouth daily. Crushed via G-tube * OTHER Stander for spine alignment Strap for toe straightening * OTHER  
ACO as needed  
  
 phenytoin 50 mg chewable tablet Commonly known as:  DILANTIN Take  by mouth three (3) times daily. sorbitol 70 % solution Take 15 mL by mouth daily as needed. THERAVITE PO Take  by mouth. * Notice: This list has 2 medication(s) that are the same as other medications prescribed for you. Read the directions carefully, and ask your doctor or other care provider to review them with you. Follow-up Instructions Return as planned. Introducing [facility] & HEALTH SERVICES! Dear Richie Harris: Thank you for requesting a [employer] account. Our records indicate that you already have an active [employer] account. You can access your account anytime at [email or web address] Did you know that you can access your hospital and ER discharge instructions at any time in [employer]? You can also review all of your test results from your hospital stay or ER visit. Additional Information If you have questions, please visit the Frequently Asked Questions section of the [employer] Kids website at [email or web address] Remember, [employer] is NOT to be used for urgent needs. For medical emergencies, dial 911. Now available from your iPhone and Android! Please provide this summary of care documentation to your next provider. Your primary care clinician is listed as Ananya Parmar. If you have any questions after today's visit, please call [phone].

## 2017-09-14 ENCOUNTER — TELEPHONE (OUTPATIENT)
Dept: FAMILY MEDICINE CLINIC | Age: 47
End: 2017-09-14

## 2017-09-14 NOTE — TELEPHONE ENCOUNTER
Shelia from [place] called stating the pt is presenting symptoms of conjunctivitis again. They are requesting medication be sent in if possible. They are also requesting that a letter be faxed over stating that Shelia contacted our office and that Dr. Alcides Benoit will be sending over an rx.  Fax #[phone]

## 2017-09-22 ENCOUNTER — OFFICE VISIT (OUTPATIENT)
Dept: FAMILY MEDICINE CLINIC | Age: 47
End: 2017-09-22

## 2017-09-22 VITALS
RESPIRATION RATE: 16 BRPM | DIASTOLIC BLOOD PRESSURE: 72 MMHG | SYSTOLIC BLOOD PRESSURE: 101 MMHG | OXYGEN SATURATION: 98 % | HEART RATE: 97 BPM

## 2017-09-22 DIAGNOSIS — H10.33 ACUTE CONJUNCTIVITIS OF BOTH EYES, UNSPECIFIED ACUTE CONJUNCTIVITIS TYPE: Primary | ICD-10-CM

## 2017-09-22 DIAGNOSIS — Z23 ENCOUNTER FOR IMMUNIZATION: ICD-10-CM

## 2017-09-22 RX ORDER — DIAZEPAM 10 MG/1
TABLET ORAL
Qty: 1 TAB | Refills: 3 | Status: SHIPPED | OUTPATIENT
Start: 2017-09-22 | End: 2017-10-05 | Stop reason: SDUPTHER

## 2017-09-22 NOTE — MR AVS SNAPSHOT
Visit Information Date & Time Provider Department Dept. Phone Encounter #  
 9/22/2017 11:45 AM Connie Plasencia  E [facility] [phone] [id number] Follow-up Instructions Return if symptoms worsen or fail to improve. Your Appointments 10/23/2017 10:30 AM  
Follow Up with Connie Plasencia MD  
[address] [facility] [address] [facility]) Appt Note: 3 month f/u, flu vaccine [address] [facility] [address] [phone]  
  
   
 [address] 8 [facility] 280 [facility] Upcoming Health Maintenance Date Due DTaP/Tdap/Td series (1 - Tdap) 11/11/1991 INFLUENZA AGE 9 TO ADULT 8/1/2017 Allergies as of 9/22/2017  Review Complete On: 9/22/2017 By: Connie Plasencia MD  
  
 Severity Noted Reaction Type Reactions Avelox [Moxifloxacin]  07/01/2016    Seizures Lamictal [Lamotrigine]  07/01/2016    Rash Lidocaine  07/01/2016    Other (comments) Lyrica [Pregabalin]  07/01/2016    Other (comments) Valproic Acid  07/01/2016    Other (comments) Current Immunizations  Never Reviewed Name Date Influenza High Dose Vaccine PF 9/7/2016 11:21 AM  
 Influenza Vaccine (Quad) PF  Incomplete Not reviewed this visit You Were Diagnosed With   
  
 Codes Comments Acute conjunctivitis of both eyes, unspecified acute conjunctivitis type    -  Primary ICD-10-CM: H10.33 ICD-9-CM: 372.00 Encounter for immunization     ICD-10-CM: J62 ICD-9-CM: V03.89 Vitals BP Pulse Resp SpO2 Smoking Status 101/72 (BP 1 Location: Right arm, BP Patient Position: Sitting) 97 16 98% Never Smoker Preferred Pharmacy Pharmacy Name Phone [address] [place], [address] Your Updated Medication List  
  
   
This list is accurate as of: 9/22/17 12:33 PM.  Always use your most recent med list.  
  
  
  
  
 BABY OIL (MINERAL OIL) EX  
by Apply Externally route. baclofen 20 mg tablet Commonly known as:  LIORESAL Take 1 tablet per g-tube at 1400 for spasticity - crush and dissolve in H2O  
  
 calcitonin (salmon) nasal  
Commonly known as:  MIACALCIN  
1 [place] by IntraNASal route daily. calcium-vitamin D 500 mg(1,250mg) -200 unit per tablet Commonly known as:  OYSTER SHELL Take 1 Tab by mouth two (2) times daily (with meals). diazePAM 10 mg tablet Commonly known as:  VALIUM Take 1 hour before eye exam Crushed and administered through G-tube  
  
 folic acid-vit S7-BTK O10 2.5-25-2 mg tablet Commonly known as:  Kellie Amrik Take 1 Tab by mouth daily. lacosamide 200 mg Tab tablet Commonly known as:  VIMPAT Take 200 mg by mouth two (2) times a day. levETIRAcetam 100 mg/mL solution Commonly known as:  KEPPRA Take 10 mg/kg by mouth two (2) times a day. levothyroxine 25 mcg tablet Commonly known as:  SYNTHROID Take  by mouth Daily (before breakfast). LISTERINE MM  
by Mucous Membrane route. LORazepam 2 mg/mL concentrated solution Commonly known as:  INTENSOL Take 1 mg by mouth every eight (8) hours as needed for Anxiety. Menthol-Zinc Oxide 0.44-20.6 % Oint Commonly known as:  Calmoseptine Apply  to affected area. multivit-min-FA-lycopen-lutein 0.4-300-250 mg-mcg-mcg Tab Commonly known as:  CENTRUM SILVER Take 1 Tab by mouth daily. Crushed via G-tube * OTHER Stander for spine alignment Strap for toe straightening * OTHER  
ACO as needed  
  
 phenytoin 50 mg chewable tablet Commonly known as:  DILANTIN Take  by mouth three (3) times daily. sorbitol 70 % solution Take 15 mL by mouth daily as needed. THERAVITE PO Take  by mouth. * Notice: This list has 2 medication(s) that are the same as other medications prescribed for you.  Read the directions carefully, and ask your doctor or other care provider to review them with you. Prescriptions Printed Refills  
 diazePAM (VALIUM) 10 mg tablet 3 Sig: Take 1 hour before eye exam 
Crushed and administered through G-tube Class: Print We Performed the Following INFLUENZA VIRUS VAC QUAD,SPLIT,PRESV FREE SYRINGE IM [id number] CPT(R)] Follow-up Instructions Return if symptoms worsen or fail to improve. Introducing [facility] & HE[facility] SERVICES! Dear Montana Swanson: Thank you for requesting a [employer] account. Our records indicate that you already have an active [employer] account. You can access your account anytime at [email or web address] Did you know that you can access your hospital and ER discharge instructions at any time in [employer]? You can also review all of your test results from your hospital stay or ER visit. Additional Information If you have questions, please visit the Frequently Asked Questions section of the [employer] website at [email or web address] Remember, [employer] is NOT to be used for urgent needs. For medical emergencies, dial 911. Now available from your iPhone and Android! Please provide this summary of care documentation to your next provider. Your primary care clinician is listed as Corrie Mitchell. If you have any questions after today's visit, please call [phone].

## 2017-09-22 NOTE — PROGRESS NOTES
HISTORY OF PRESENT ILLNESS  Sarah Beth Hernadez is a 55 y.o. male. HPI  Recurrent conjunctivitis unresponsive to treatment  Recommended to ophthalomology  Review of Systems   Eyes: Positive for discharge and redness. All other systems reviewed and are negative. Past Medical History:   Diagnosis Date    Cerebral palsy ([address]     Scoliosis     Seizures (HCC)     Thyroid disease        Current Outpatient Prescriptions:     baclofen (LIORESAL) 20 mg tablet, Take 1 tablet per g-tube at 1400 for spasticity - crush and dissolve in H2O, Disp: 90 Tab, Rfl: 3    levothyroxine (SYNTHROID) 25 mcg tablet, Take  by mouth Daily (before breakfast). , Disp: , Rfl:     diazePAM (VALIUM) 10 mg tablet, Take 1 hour before dental procedure Crushed and administered through G-tube, Disp: 1 Tab, Rfl: 3    OTHER, ACO as needed, Disp: 1 Each, Rfl: 0    OTHER, Stander for spine alignment Strap for toe straightening, Disp: 1 Each, Rfl: 0    multivit-min-FA-lycopen-lutein (CENTRUM SILVER) 0.4-300-250 mg-mcg-mcg tab, Take 1 Tab by mouth daily. Crushed via G-tube, Disp: 100 Tab, Rfl: 3    levETIRAcetam (KEPPRA) 100 mg/mL solution, Take 10 mg/kg by mouth two (2) times a day., Disp: , Rfl:     MULTIVITAMIN,THERAPEUTIC (THERAVITE PO), Take  by mouth., Disp: , Rfl:     sorbitol 70 % solution, Take 15 mL by mouth daily as needed. , Disp: , Rfl:     phenytoin (DILANTIN) 50 mg tablet, Take  by mouth three (3) times daily. , Disp: , Rfl:     calcium-vitamin D (OYSTER SHELL) 500 mg(1,250mg) -200 unit per tablet, Take 1 Tab by mouth two (2) times daily (with meals). , Disp: , Rfl:     folic acid-vit W8-MLZ O76 (FOLTX) 2.5-25-2 mg tablet, Take 1 Tab by mouth daily. , Disp: , Rfl:     calcitonin, salmon, (MIACALCIN) nasal, 1 [place] by IntraNASal route daily. , Disp: , Rfl:     lacosamide (VIMPAT) 200 mg tab tablet, Take 200 mg by mouth two (2) times a day., Disp: , Rfl:     LORazepam (INTENSOL) 2 mg/mL concentrated solution, Take 1 mg by mouth every eight (8) hours as needed for Anxiety. , Disp: , Rfl:     Menthol-Zinc Oxide (CALMOSEPTINE) 0.44-20.6 % oint, Apply  to affected area., Disp: , Rfl:     EUCALYP/ME-SALICYLATE/MEN/THYM (LISTERINE MM), by Mucous Membrane route., Disp: , Rfl:     BABY OIL, MINERAL OIL, EX, by Apply Externally route., Disp: , Rfl:   Visit Vitals    /72 (BP 1 Location: Right arm, BP Patient Position: Sitting)    Pulse 97    Resp 16    SpO2 98%         Physical Exam   Eyes: Pupils are equal, round, and reactive to light. Right eye exhibits discharge. Left eye exhibits discharge. No scleral icterus.    Neurological:   Chronic neurologic deficits       ASSESSMENT and PLAN  recurrent conjunctivitis   Plan  Refer to eye dr  Pre medicate with diazepam

## 2017-09-22 NOTE — PROGRESS NOTES
Rashida Zamorano is a 55 y.o. male  Chief Complaint   Patient presents with    Eye Problem     Per care giver at visit    1. Have you been to the ER, urgent care clinic or hospitalized since your last visit? NO.     2. Have you seen or consulted any other health care providers outside of the [address] [place] [address] since your last visit (Include any pap smears or colon screening)? NO      Do you have an Advanced Directive? NO    Would you like information on Advanced Directives?  NO

## 2017-09-22 NOTE — LETTER
9/22/2017 12:31 PM 
 
Mr. Jolanta Dillon [address] [address] [address] Please excuse from day program until seen by ophthalmologist on 10/02/17. Sincerely, Anu Read MD

## 2017-09-26 RX ORDER — FERROUS SULFATE, DRIED 160(50) MG
1 TABLET, EXTENDED RELEASE ORAL 2 TIMES DAILY WITH MEALS
Qty: 60 TAB | Refills: 0 | Status: SHIPPED | OUTPATIENT
Start: 2017-09-26 | End: 2017-10-19 | Stop reason: SDUPTHER

## 2017-09-26 RX ORDER — SORBITOL SOLUTION 70 %
SOLUTION, ORAL MISCELLANEOUS
Qty: 474 ML | Refills: 0 | Status: SHIPPED | OUTPATIENT
Start: 2017-09-26 | End: 2017-10-19 | Stop reason: SDUPTHER

## 2017-09-26 RX ORDER — LEVOTHYROXINE SODIUM 25 UG/1
TABLET ORAL
Qty: 30 TAB | Refills: 0 | Status: SHIPPED | OUTPATIENT
Start: 2017-09-26 | End: 2017-10-19 | Stop reason: SDUPTHER

## 2017-10-05 RX ORDER — DIAZEPAM 10 MG/1
TABLET ORAL
Qty: 1 TAB | Refills: 3 | OUTPATIENT
Start: 2017-10-05 | End: 2017-10-06 | Stop reason: SDUPTHER

## 2017-10-06 RX ORDER — DIAZEPAM 10 MG/1
TABLET ORAL
Qty: 1 TAB | Refills: 3 | Status: SHIPPED | OUTPATIENT
Start: 2017-10-06 | End: 2018-06-14 | Stop reason: SDUPTHER

## 2017-10-20 ENCOUNTER — OFFICE VISIT (OUTPATIENT)
Dept: FAMILY MEDICINE CLINIC | Age: 47
End: 2017-10-20

## 2017-10-20 VITALS — DIASTOLIC BLOOD PRESSURE: 73 MMHG | OXYGEN SATURATION: 97 % | HEART RATE: 86 BPM | SYSTOLIC BLOOD PRESSURE: 103 MMHG

## 2017-10-20 DIAGNOSIS — G80.2 SPASTIC HEMIPLEGIC CEREBRAL PALSY (HCC): Primary | ICD-10-CM

## 2017-10-20 DIAGNOSIS — Z23 ENCOUNTER FOR IMMUNIZATION: ICD-10-CM

## 2017-10-20 RX ORDER — CALCITONIN SALMON 200 [IU]/.09ML
1 SPRAY, METERED NASAL DAILY
Qty: 1 BOTTLE | Refills: 3 | Status: SHIPPED | OUTPATIENT
Start: 2017-10-20 | End: 2018-01-30 | Stop reason: SDUPTHER

## 2017-10-20 RX ORDER — FERROUS SULFATE, DRIED 160(50) MG
1 TABLET, EXTENDED RELEASE ORAL 2 TIMES DAILY WITH MEALS
Qty: 60 TAB | Refills: 0 | Status: SHIPPED | OUTPATIENT
Start: 2017-10-20 | End: 2020-10-22

## 2017-10-20 RX ORDER — LEVOTHYROXINE SODIUM 25 UG/1
TABLET ORAL
Qty: 30 TAB | Refills: 0 | Status: SHIPPED | OUTPATIENT
Start: 2017-10-20 | End: 2020-10-22

## 2017-10-20 RX ORDER — SORBITOL SOLUTION 70 %
SOLUTION, ORAL MISCELLANEOUS
Qty: 474 ML | Refills: 0 | Status: SHIPPED | OUTPATIENT
Start: 2017-10-20 | End: 2020-12-08

## 2017-10-20 RX ORDER — LORATADINE 10 MG/1
10 TABLET ORAL
Qty: 90 TAB | Refills: 3 | Status: SHIPPED | OUTPATIENT
Start: 2017-10-20 | End: 2021-06-29

## 2017-10-20 NOTE — PROGRESS NOTES
Braeden Jim is a 55 y.o. male  Chief Complaint   Patient presents with    Abnormal Lab Results    Immunization/Injection     1. Have you been to the ER, urgent care clinic or hospitalized since your last visit? NO.     2. Have you seen or consulted any other health care providers outside of the [address] [place] [address] since your last visit (Include any pap smears or colon screening)? NO      Do you have an Advanced Directive? NO    Would you like information on Advanced Directives?  NO

## 2017-10-20 NOTE — MR AVS SNAPSHOT
Visit Information Date & Time Provider Department Dept. Phone Encounter #  
 10/20/2017 10:00 AM Alejandra Moctezuma [facility] [phone] [id number] Upcoming Health Maintenance Date Due DTaP/Tdap/Td series (1 - Tdap) 11/11/1991 Allergies as of 10/20/2017  Review Complete On: 10/20/2017 By: Mckay Sinha MD  
  
 Severity Noted Reaction Type Reactions Avelox [Moxifloxacin]  07/01/2016    Seizures Lamictal [Lamotrigine]  07/01/2016    Rash Lidocaine  07/01/2016    Other (comments) Lyrica [Pregabalin]  07/01/2016    Other (comments) Valproic Acid  07/01/2016    Other (comments) Current Immunizations  Never Reviewed Name Date Influenza High Dose Vaccine PF 9/7/2016 11:21 AM  
 Influenza Vaccine (Quad) PF 9/22/2017 12:35 PM  
 Pneumococcal Polysaccharide (PPSV-23)  Incomplete Not reviewed this visit You Were Diagnosed With   
  
 Codes Comments Encounter for immunization    -  Primary ICD-10-CM: L11 ICD-9-CM: V03.89 Vitals BP Pulse SpO2 Smoking Status 103/73 (BP 1 Location: Left arm, BP Patient Position: Sitting) 86 97% Never Smoker Vitals History Preferred Pharmacy Pharmacy Name Phone [address] [place], [address] [place] [address] Your Updated Medication List  
  
   
This list is accurate as of: 10/20/17 11:01 AM.  Always use your most recent med list.  
  
  
  
  
 BABY OIL (MINERAL OIL) EX  
by Apply Externally route. baclofen 20 mg tablet Commonly known as:  LIORESAL Take 1 tablet per g-tube at 1400 for spasticity - crush and dissolve in H2O  
  
 calcitonin (salmon) nasal  
Commonly known as:  MIACALCIN  
1 [place] by IntraNASal route daily. calcium-vitamin D 500 mg(1,250mg) -200 unit per tablet Commonly known as:  OYSTER SHELL Take 1 Tab by mouth two (2) times daily (with meals). diazePAM 10 mg tablet Commonly known as:  VALIUM Take 1 hour before eye exam Crushed and administered through G-tube  
  
 folic acid-vit P0-JZY Y29 2.5-25-2 mg tablet Commonly known as:  Radha Ronan Take 1 Tab by mouth daily. lacosamide 200 mg Tab tablet Commonly known as:  VIMPAT Take 200 mg by mouth two (2) times a day. levETIRAcetam 100 mg/mL solution Commonly known as:  KEPPRA Take 10 mg/kg by mouth two (2) times a day. levothyroxine 25 mcg tablet Commonly known as:  SYNTHROID Take 1 tab via g-tube once daily  Crush and dissolve in h20 LISTERINE MM  
by Mucous Membrane route.  
  
 loratadine 10 mg tablet Commonly known as:  Susu Pace Take 1 Tab by mouth daily as needed for Allergies. LORazepam 2 mg/mL concentrated solution Commonly known as:  INTENSOL Take 1 mg by mouth every eight (8) hours as needed for Anxiety. Menthol-Zinc Oxide 0.44-20.6 % Oint Commonly known as:  Calmoseptine Apply  to affected area. * multivit-min-FA-lycopen-lutein 0.4-300-250 mg-mcg-mcg Tab Commonly known as:  Dahiana Urias Certavite Senior Tab Quantity 30 Crush 1 tablet and give via g-tube once daily * multivit-min-FA-lycopen-lutein 0.4-300-250 mg-mcg-mcg Tab Commonly known as:  CENTRUM SILVER Take 1 Tab by mouth daily. Crushed via G-tube * OTHER Stander for spine alignment Strap for toe straightening * OTHER  
ACO as needed  
  
 phenytoin 50 mg chewable tablet Commonly known as:  DILANTIN Take  by mouth three (3) times daily. sorbitol 70 % solution Take 15 ml via g-tube in the morning to maintain bowel regularity THERAVITE PO Take  by mouth. * Notice: This list has 4 medication(s) that are the same as other medications prescribed for you. Read the directions carefully, and ask your doctor or other care provider to review them with you. Prescriptions Sent to Pharmacy Refills loratadine (CLARITIN) 10 mg tablet 3 Sig: Take 1 Tab by mouth daily as needed for Allergies. Class: Normal  
 Pharmacy: [address] [place] [address] #: [phone] Route: Oral  
  
We Performed the Following PNEUMOCOCCAL POLYSACCHARIDE VACCINE, 23-VALENT, ADULT OR IMMUNOSUPPRESSED PT DOSE, [17131 CPT(R)] Introducing [facility] SERVICES! Dear Devon Liang: Thank you for requesting a [employer] account. Our records indicate that you already have an active [employer] account. You can access your account anytime at [email or web address] Did you know that you can access your hospital and ER discharge instructions at any time in [employer]? You can also review all of your test results from your hospital stay or ER visit. Additional Information If you have questions, please visit the Frequently Asked Questions section of the [employer] website at [email or web address] Remember, [employer] is NOT to be used for urgent needs. For medical emergencies, dial 911. Now available from your iPhone and Android! Please provide this summary of care documentation to your next provider. Your primary care clinician is listed as Dora Leggett. If you have any questions after today's visit, please call [phone].

## 2017-10-20 NOTE — PROGRESS NOTES
HISTORY OF PRESENT ILLNESS  Eve Mack is a 55 y.o. male. HPI  Seizures stable  CP stable  C/o congestion  Review of Systems   Neurological: Positive for focal weakness and seizures. All other systems reviewed and are negative. Past Medical History:   Diagnosis Date    Cerebral palsy ([address]     Scoliosis     Seizures ([address]     Thyroid disease      Current Outpatient Prescriptions on File Prior to Visit   Medication Sig Dispense Refill    levothyroxine (SYNTHROID) 25 mcg tablet Take 1 tab via g-tube once daily   Crush and dissolve in h20 30 Tab 0    multivit-min-FA-lycopen-lutein (CENTRUM SILVER) 0.4-300-250 mg-mcg-mcg tab Take 1 Tab by mouth daily. Crushed via G-tube 100 Tab 3    calcium-vitamin D (OYSTER SHELL) 500 mg(1,250mg) -200 unit per tablet Take 1 Tab by mouth two (2) times daily (with meals). 60 Tab 0    sorbitol 70 % solution Take 15 ml via g-tube in the morning to maintain bowel regularity 474 mL 0    diazePAM (VALIUM) 10 mg tablet Take 1 hour before eye exam  Crushed and administered through G-tube 1 Tab 3    multivit-min-FA-lycopen-lutein (CERTAVITE SENIOR-ANTIOXIDANT) 0.4-300-250 mg-mcg-mcg tab Certavite Senior Tab  Quantity 30  Crush 1 tablet and give via g-tube once daily 30 Tab 0    OTHER ACO as needed 1 Each 0    OTHER Stander for spine alignment  Strap for toe straightening 1 Each 0    levETIRAcetam (KEPPRA) 100 mg/mL solution Take 10 mg/kg by mouth two (2) times a day.  MULTIVITAMIN,THERAPEUTIC (THERAVITE PO) Take  by mouth.  phenytoin (DILANTIN) 50 mg tablet Take  by mouth three (3) times daily.  folic acid-vit D3-YPN G68 (FOLTX) 2.5-25-2 mg tablet Take 1 Tab by mouth daily.  lacosamide (VIMPAT) 200 mg tab tablet Take 200 mg by mouth two (2) times a day.  LORazepam (INTENSOL) 2 mg/mL concentrated solution Take 1 mg by mouth every eight (8) hours as needed for Anxiety.  Menthol-Zinc Oxide (CALMOSEPTINE) 0.44-20.6 % oint Apply  to affected area.  EUCALYP/ME-SALICYLATE/MEN/THYM (LISTERINE MM) by Mucous Membrane route.  BABY OIL, MINERAL OIL, EX by Apply Externally route.  calcitonin, salmon, (MIACALCIN) nasal 1 [place] by IntraNASal route daily. 1 Bottle 3    baclofen (LIORESAL) 20 mg tablet Take 1 tablet per g-tube at 1400 for spasticity - crush and dissolve in H2O 90 Tab 3     No current facility-administered medications on file prior to visit. Visit Vitals    /73 (BP 1 Location: Left arm, BP Patient Position: Sitting)    Pulse 86    SpO2 97%         Physical Exam   Constitutional: He appears well-developed and well-nourished. No distress. Neurological:   Lethargic  Chronic neurologic deficits   Skin: He is not diaphoretic. Vitals reviewed.       ASSESSMENT and PLAN  CP   Plan  Pneumovax  Claritin prn  Recheck in 3 months

## 2017-12-14 RX ORDER — BACLOFEN 20 MG/1
TABLET ORAL
Qty: 90 TAB | Refills: 3 | Status: SHIPPED | OUTPATIENT
Start: 2017-12-14 | End: 2018-02-05 | Stop reason: SDUPTHER

## 2018-01-02 ENCOUNTER — TELEPHONE (OUTPATIENT)
Dept: FAMILY MEDICINE CLINIC | Age: 48
End: 2018-01-02

## 2018-01-02 NOTE — TELEPHONE ENCOUNTER
Care giver was notified that all routine labs were done and repeat labs is due in July. Verbalized understanding.

## 2018-01-17 ENCOUNTER — OFFICE VISIT (OUTPATIENT)
Dept: FAMILY MEDICINE CLINIC | Age: 48
End: 2018-01-17

## 2018-01-17 VITALS
RESPIRATION RATE: 18 BRPM | SYSTOLIC BLOOD PRESSURE: 102 MMHG | OXYGEN SATURATION: 93 % | HEART RATE: 79 BPM | WEIGHT: 126 LBS | TEMPERATURE: 97 F | DIASTOLIC BLOOD PRESSURE: 71 MMHG

## 2018-01-17 DIAGNOSIS — G80.8 OTHER CEREBRAL PALSY (HCC): Primary | ICD-10-CM

## 2018-01-17 DIAGNOSIS — E07.9 THYROID DISEASE: ICD-10-CM

## 2018-01-17 DIAGNOSIS — Z43.1 ATTENTION TO G-TUBE (HCC): ICD-10-CM

## 2018-01-17 DIAGNOSIS — R56.9 SEIZURES (HCC): ICD-10-CM

## 2018-01-17 RX ORDER — LEVETIRACETAM 100 MG/ML
SOLUTION ORAL
Qty: 1000 ML | Refills: 3
Start: 2018-01-17 | End: 2020-05-20

## 2018-01-17 NOTE — PROGRESS NOTES
HISTORY OF PRESENT ILLNESS  Mickey Coy is a 52 y.o. male. HPI  CP stable  Seizures stable  Needs G-tube replaced  Review of Systems   All other systems reviewed and are negative. Past Medical History:   Diagnosis Date    Cerebral palsy ([address]     Scoliosis     [facility]     Thyroid disease      Current Outpatient Prescriptions on File Prior to Visit   Medication Sig Dispense Refill    baclofen (LIORESAL) 20 mg tablet Take 1 tablet per g-tube at 1400 for spasticity - crush and dissolve in H2O 90 Tab 3    calcitonin, salmon, (MIACALCIN) nasal 1 [place] by IntraNASal route daily. 1 Bottle 3    levothyroxine (SYNTHROID) 25 mcg tablet Take 1 tab via g-tube once daily   Crush and dissolve in h20 30 Tab 0    multivit-min-FA-lycopen-lutein (CENTRUM SILVER) 0.4-300-250 mg-mcg-mcg tab Take 1 Tab by mouth daily. Crushed via G-tube 100 Tab 3    calcium-vitamin D (OYSTER SHELL) 500 mg(1,250mg) -200 unit per tablet Take 1 Tab by mouth two (2) times daily (with meals). 60 Tab 0    sorbitol 70 % solution Take 15 ml via g-tube in the morning to maintain bowel regularity 474 mL 0    loratadine (CLARITIN) 10 mg tablet Take 1 Tab by mouth daily as needed for Allergies. 90 Tab 3    diazePAM (VALIUM) 10 mg tablet Take 1 hour before eye exam  Crushed and administered through G-tube 1 Tab 3    multivit-min-FA-lycopen-lutein (CERTAVITE SENIOR-ANTIOXIDANT) 0.4-300-250 mg-mcg-mcg tab Certavite Senior Tab  Quantity 30  Crush 1 tablet and give via g-tube once daily 30 Tab 0    OTHER ACO as needed 1 Each 0    OTHER Stander for spine alignment  Strap for toe straightening 1 Each 0    MULTIVITAMIN,THERAPEUTIC (THERAVITE PO) Take  by mouth.  phenytoin (DILANTIN) 50 mg tablet Take  by mouth three (3) times daily.  folic acid-vit D4-JQW D44 (FOLTX) 2.5-25-2 mg tablet Take 1 Tab by mouth daily.  lacosamide (VIMPAT) 200 mg tab tablet Take 200 mg by mouth two (2) times a day.       LORazepam (INTENSOL) 2 mg/mL concentrated solution Take 1 mg by mouth every eight (8) hours as needed for Anxiety.  Menthol-Zinc Oxide (CALMOSEPTINE) 0.44-20.6 % oint Apply  to affected area.  EUCALYP/ME-SALICYLATE/MEN/THYM (LISTERINE MM) by Mucous Membrane route.  BABY OIL, MINERAL OIL, EX by Apply Externally route. No current facility-administered medications on file prior to visit. Visit Vitals    /71    Pulse 79    Temp 97 °F (36.1 °C)    Resp 18    Wt 126 lb (57.2 kg)    SpO2 93%         Physical Exam   Constitutional: He appears well-developed and well-nourished. No distress. Neurological:   obtunded   Skin: He is not diaphoretic. Vitals reviewed.       ASSESSMENT and PLAN  cerebral palsy   seizures  Plan  Refer for G-tube replacement  Recheck in 3 months

## 2018-01-17 NOTE — MR AVS SNAPSHOT
Sacha Gleason 
 
 
 [address] [facility] [address] 
[phone] Patient: Jose Luis Robert MRN: [id number] Visit Information Date & Time Provider Department Dept. Phone Encounter #  
 1/17/2018 11:15 AM Yolanda Patel, [facility] [phone] [id number] Follow-up Instructions Return in about 3 months (around 4/17/2018). Follow-up and Disposition History Upcoming Health Maintenance Date Due DTaP/Tdap/Td series (1 - Tdap) 11/11/1991 Allergies as of 1/17/2018  Review Complete On: 1/17/2018 By: Yolanda Patel MD  
  
 Severity Noted Reaction Type Reactions Avelox [Moxifloxacin]  07/01/2016    Seizures Lamictal [Lamotrigine]  07/01/2016    Rash Lidocaine  07/01/2016    Other (comments) Lyrica [Pregabalin]  07/01/2016    Other (comments) Valproic Acid  07/01/2016    Other (comments) Current Immunizations  Never Reviewed Name Date Influenza High Dose Vaccine PF 9/7/2016 11:21 AM  
 Influenza Vaccine (Quad) PF 9/22/2017 12:35 PM  
 Pneumococcal Polysaccharide (PPSV-23) 10/20/2017 11:12 AM  
  
 Not reviewed this visit You Were Diagnosed With   
  
 Codes Comments Other cerebral palsy ([facility]    -  Primary ICD-10-CM: G80.8 ICD-9-CM: 275. 8 Thyroid disease     ICD-10-CM: E07.9 ICD-9-CM: 246.9 Seizures ([facility]     ICD-10-CM: R56.9 ICD-9-CM: 780.39 Attention to G-tube [facility])     ICD-10-CM: Z43.1 ICD-9-CM: V55.1 Vitals BP Pulse Temp Resp Weight(growth percentile) SpO2  
 102/71 79 97 °F (36.1 °C) 18 126 lb (57.2 kg) 93% Smoking Status Never Smoker Preferred Pharmacy Pharmacy Name Phone 103 [place], [facility] Your Updated Medication List  
  
   
This list is accurate as of: 1/17/18 11:50 AM.  Always use your most recent med list.  
  
  
  
 BABY OIL (MINERAL OIL) EX  
by Apply Externally route. baclofen 20 mg tablet Commonly known as:  LIORESAL Take 1 tablet per g-tube at 1400 for spasticity - crush and dissolve in H2O  
  
 calcitonin (salmon) nasal  
Commonly known as:  MIACALCIN  
1 [place] by IntraNASal route daily. calcium-vitamin D 500 mg(1,250mg) -200 unit per tablet Commonly known as:  OYSTER SHELL Take 1 Tab by mouth two (2) times daily (with meals). diazePAM 10 mg tablet Commonly known as:  VALIUM Take 1 hour before eye exam Crushed and administered through G-tube  
  
 folic acid-vit I1-MHF R02 2.5-25-2 mg tablet Commonly known as:  Sharon Ip Take 1 Tab by mouth daily. lacosamide 200 mg Tab tablet Commonly known as:  VIMPAT Take 200 mg by mouth two (2) times a day. levETIRAcetam 100 mg/mL solution Commonly known as:  KEPPRA 5 ml twice a day  
  
 levothyroxine 25 mcg tablet Commonly known as:  SYNTHROID Take 1 tab via g-tube once daily  Crush and dissolve in h20 LISTERINE MM  
by Mucous Membrane route.  
  
 loratadine 10 mg tablet Commonly known as:  Zygmunt Erichsen Take 1 Tab by mouth daily as needed for Allergies. LORazepam 2 mg/mL concentrated solution Commonly known as:  INTENSOL Take 1 mg by mouth every eight (8) hours as needed for Anxiety. Menthol-Zinc Oxide 0.44-20.6 % Oint Commonly known as:  Calmoseptine Apply  to affected area. * multivit-min-FA-lycopen-lutein 0.4-300-250 mg-mcg-mcg Tab Commonly known as:  Junious Hunter Certavite Senior Tab Quantity 30 Crush 1 tablet and give via g-tube once daily * multivit-min-FA-lycopen-lutein 0.4-300-250 mg-mcg-mcg Tab Commonly known as:  CENTRUM SILVER Take 1 Tab by mouth daily. Crushed via G-tube * OTHER Stander for spine alignment Strap for toe straightening * OTHER  
ACO as needed  
  
 phenytoin 50 mg chewable tablet Commonly known as:  DILANTIN  
 Take  by mouth three (3) times daily. sorbitol 70 % solution Take 15 ml via g-tube in the morning to maintain bowel regularity THERAVITE PO Take  by mouth. * Notice: This list has 4 medication(s) that are the same as other medications prescribed for you. Read the directions carefully, and ask your doctor or other care provider to review them with you. We Performed the Following REFERRAL TO INTERVENTIONAL RADIOLOGY [VZM37 Custom] Comments:  
 Advanced Cooling Therapy G-tube replacement Follow-up Instructions Return in about 3 months (around 4/17/2018). Referral Information Referral ID Referred By Referred To  
  
 [id number] Enoc Luna Not Available Visits Status Start Date End Date 1 New Request 1/17/18 1/17/19 If your referral has a status of pending review or denied, additional information will be sent to support the outcome of this decision. Introducing [facility]! Dear Perico Granados: Thank you for requesting a [employer] account. Our records indicate that you already have an active [employer] account. You can access your account anytime at [email or web address] Did you know that you can access your hospital and ER discharge instructions at any time in [employer]? You can also review all of your test results from your hospital stay or ER visit. Additional Information If you have questions, please visit the Frequently Asked Questions section of the [employer] website at [email or web address] Remember, [employer] is NOT to be used for urgent needs. For medical emergencies, dial 911. Now available from your iPhone and Android! Please provide this summary of care documentation to your next provider. Your primary care clinician is listed as Osmin Espinoza. If you have any questions after today's visit, please call [phone].

## 2018-01-17 NOTE — PROGRESS NOTES
Renetta Barrientos is a 52 y.o. male (: 1970) presenting to address:    Chief Complaint   Patient presents with    Follow-up       Vitals:    18 1117   BP: 102/71   Pulse: 79   Resp: 18   Temp: 97 °F (36.1 °C)   SpO2: 93%   Weight: 126 lb (57.2 kg)   PainSc:   0 - No pain       Hearing/Vision:   No exam data present    Learning Assessment:   No flowsheet data found. Depression Screening:     PHQ over the last two weeks 2018   PHQ Not Done -   Little interest or pleasure in doing things Not at all   Feeling down, depressed or hopeless Not at all   Total Score PHQ 2 0     Fall Risk Assessment:   No flowsheet data found. Abuse Screening:   No flowsheet data found. Coordination of Care Questionaire:   1. Have you been to the ER, urgent care clinic since your last visit? Hospitalized since your last visit? YES    2. Have you seen or consulted any other health care providers outside of the [address] [place] [address] since your last visit? Include any pap smears or colon screening. NO    Advanced Directive:   1. Do you have an Advanced Directive? NO    2. Would you like information on Advanced Directives?  NO

## 2018-01-30 RX ORDER — CALCITONIN SALMON 200 [IU]/.09ML
SPRAY, METERED NASAL
Qty: 3.7 ML | Refills: 0 | Status: SHIPPED | OUTPATIENT
Start: 2018-01-30 | End: 2018-02-22 | Stop reason: SDUPTHER

## 2018-02-02 ENCOUNTER — TELEPHONE (OUTPATIENT)
Dept: FAMILY MEDICINE CLINIC | Age: 48
End: 2018-02-02

## 2018-02-02 RX ORDER — OSELTAMIVIR PHOSPHATE 75 MG/1
75 CAPSULE ORAL 2 TIMES DAILY
Qty: 10 CAP | Refills: 0 | Status: SHIPPED | OUTPATIENT
Start: 2018-02-02 | End: 2018-02-07

## 2018-02-02 NOTE — TELEPHONE ENCOUNTER
Shelia from [facility] called stating that a few staff members have been diagnosed with the flu. She informed me that protocol states they need to request Tamiflu as a precaution for all of their residents.     Requesting medication to be sent to [facility]

## 2018-02-05 RX ORDER — BACLOFEN 20 MG/1
TABLET ORAL
Qty: 90 TAB | Refills: 0 | Status: SHIPPED | OUTPATIENT
Start: 2018-02-05 | End: 2018-02-22 | Stop reason: SDUPTHER

## 2018-02-22 RX ORDER — BACLOFEN 20 MG/1
TABLET ORAL
Qty: 90 TAB | Refills: 0 | Status: SHIPPED | OUTPATIENT
Start: 2018-02-22 | End: 2018-03-29 | Stop reason: SDUPTHER

## 2018-02-22 RX ORDER — CALCITONIN SALMON 200 [IU]/.09ML
SPRAY, METERED NASAL
Qty: 3.7 ML | Refills: 0 | Status: SHIPPED | OUTPATIENT
Start: 2018-02-22 | End: 2018-03-29 | Stop reason: SDUPTHER

## 2018-03-26 ENCOUNTER — TELEPHONE (OUTPATIENT)
Dept: FAMILY MEDICINE CLINIC | Age: 48
End: 2018-03-26

## 2018-03-26 NOTE — TELEPHONE ENCOUNTER
Pt's caregiver, Shelia, called to report that they are taking the pt to Urgent Care right now because he has some sort of a pimple filled with pus near his scrotum.  Just an [facility]

## 2018-03-29 RX ORDER — CALCITONIN SALMON 200 [IU]/.09ML
SPRAY, METERED NASAL
Qty: 6 ML | Refills: 0 | Status: SHIPPED | OUTPATIENT
Start: 2018-03-29 | End: 2018-04-18 | Stop reason: SDUPTHER

## 2018-03-29 RX ORDER — BACLOFEN 20 MG/1
TABLET ORAL
Qty: 90 TAB | Refills: 0 | Status: SHIPPED | OUTPATIENT
Start: 2018-03-29 | End: 2018-04-18 | Stop reason: SDUPTHER

## 2018-04-06 ENCOUNTER — OFFICE VISIT (OUTPATIENT)
Dept: FAMILY MEDICINE CLINIC | Age: 48
End: 2018-04-06

## 2018-04-06 ENCOUNTER — HOSPITAL ENCOUNTER (OUTPATIENT)
Dept: LAB | Age: 48
Discharge: HOME | End: 2018-04-06
Payer: MEDICAID

## 2018-04-06 VITALS
OXYGEN SATURATION: 93 % | HEART RATE: 93 BPM | TEMPERATURE: 96.9 F | SYSTOLIC BLOOD PRESSURE: 104 MMHG | DIASTOLIC BLOOD PRESSURE: 98 MMHG | RESPIRATION RATE: 20 BRPM

## 2018-04-06 DIAGNOSIS — N34.2 URETHRITIS: Primary | ICD-10-CM

## 2018-04-06 DIAGNOSIS — N34.2 URETHRITIS: ICD-10-CM

## 2018-04-06 PROCEDURE — 87077 CULTURE AEROBIC IDENTIFY: CPT | Performed by: INTERNAL MEDICINE

## 2018-04-06 PROCEDURE — 87186 SC STD MICRODIL/AGAR DIL: CPT | Performed by: INTERNAL MEDICINE

## 2018-04-06 PROCEDURE — 87070 CULTURE OTHR SPECIMN AEROBIC: CPT | Performed by: INTERNAL MEDICINE

## 2018-04-06 NOTE — PROGRESS NOTES
Keshawn Tovar is a 52 y.o. male is here with a caregiver who states he has a rash in his groin. 1. Have you been to the ER, urgent care clinic since your last visit? Hospitalized since your last visit? No    2. Have you seen or consulted any other health care providers outside of the [facility] since your last visit? Include any pap smears or colon screening.  No     Health Maintenance Due   Topic Date Due    DTaP/Tdap/Td series (1 - Tdap) 11/11/1991

## 2018-04-06 NOTE — MR AVS SNAPSHOT
[address] [facility] 
 
 
 [address] [facility] [id number] [phone] Patient: Darlene Mckinney MRN: [id number] Visit Information Date & Time Provider Department Dept. Phone Encounter #  
 4/6/2018  9:00 AM Caroline Velez [facility] [phone] [id number] Follow-up Instructions Return if symptoms worsen or fail to improve. Follow-up and Disposition History Your Appointments 4/20/2018 11:00 AM  
Follow Up with Caroline Velez MD  
[facility]: 3 month f/u; Rescheduling Appointment From 04/17/2018  
 [address] [facility] [address] [facility] [id number] [phone]  
  
   
 [address] Upcoming Health Maintenance Date Due DTaP/Tdap/Td series (1 - Tdap) 11/11/1991 Allergies as of 4/6/2018  Review Complete On: 4/6/2018 By: Caroline Velez MD  
  
 Severity Noted Reaction Type Reactions Avelox [Moxifloxacin]  07/01/2016    Seizures Lamictal [Lamotrigine]  07/01/2016    Rash Lidocaine  07/01/2016    Other (comments) Lyrica [Pregabalin]  07/01/2016    Other (comments) Valproic Acid  07/01/2016    Other (comments) Current Immunizations  Never Reviewed Name Date Influenza High Dose Vaccine PF 9/7/2016 11:21 AM  
 Influenza Vaccine (Quad) PF 9/22/2017 12:35 PM  
 Pneumococcal Polysaccharide (PPSV-23) 10/20/2017 11:12 AM  
  
 Not reviewed this visit You Were Diagnosed With   
  
 Codes Comments Urethritis    -  Primary ICD-10-CM: N34.2 ICD-9-CM: 597.80 Vitals BP Pulse Temp Resp SpO2 Smoking Status (!) 104/98 (BP 1 Location: Left arm, BP Patient Position: Sitting) 93 96.9 °F (36.1 °C) (Axillary) 20 93% Never Smoker Preferred Pharmacy Pharmacy Name Phone 103 [place], [address] [place] [address] Your Updated Medication List  
  
   
This list is accurate as of 4/6/18  9:23 AM.  Always use your most recent med list.  
  
  
  
  
 BABY OIL (MINERAL OIL) EX  
by Apply Externally route. baclofen 20 mg tablet Commonly known as:  LIORESAL  
TAKE 1 TAB VIA G-TUBE 3 TIMES DAILY FOR SPASTICITY - CRUSH AND DISSOLVE IN H2O  
  
 calcitonin (salmon) nasal  
Commonly known as:  MIACALCIN  
USE 1 SPRAY IN ONE NOSTRIL DAILY FOR OSTEOPOROSIS - ALTERNATE NOSTRILS DAILY - **REFRIGERATE UNTIL OPENED;THEN STORE AT ROOM TEMP.;DISCARD 3  
  
 calcium-vitamin D 500 mg(1,250mg) -200 unit per tablet Commonly known as:  OYSTER SHELL Take 1 Tab by mouth two (2) times daily (with meals). diazePAM 10 mg tablet Commonly known as:  VALIUM Take 1 hour before eye exam Crushed and administered through G-tube  
  
 folic acid-vit Q8-RDC I45 2.5-25-2 mg tablet Commonly known as:  Donnise Boehringer Take 1 Tab by mouth daily. lacosamide 200 mg Tab tablet Commonly known as:  VIMPAT Take 200 mg by mouth two (2) times a day. levETIRAcetam 100 mg/mL solution Commonly known as:  KEPPRA 5 ml twice a day  
  
 levothyroxine 25 mcg tablet Commonly known as:  SYNTHROID Take 1 tab via g-tube once daily  Crush and dissolve in h20 LISTERINE MM  
by Mucous Membrane route.  
  
 loratadine 10 mg tablet Commonly known as:  Alveta Tigist Take 1 Tab by mouth daily as needed for Allergies. LORazepam 2 mg/mL concentrated solution Commonly known as:  INTENSOL Take 1 mg by mouth every eight (8) hours as needed for Anxiety. Menthol-Zinc Oxide 0.44-20.6 % Oint Commonly known as:  Calmoseptine Apply  to affected area. * multivit-min-FA-lycopen-lutein 0.4-300-250 mg-mcg-mcg Tab Commonly known as:  Lisa Filter Certavite Senior Tab Quantity 30 Crush 1 tablet and give via g-tube once daily * multivit-min-FA-lycopen-lutein 0.4-300-250 mg-mcg-mcg Tab Commonly known as:  CENTRUM SILVER Take 1 Tab by mouth daily. Crushed via G-tube * OTHER Stander for spine alignment Strap for toe straightening * OTHER  
ACO as needed  
  
 phenytoin 50 mg chewable tablet Commonly known as:  DILANTIN Take  by mouth three (3) times daily. sorbitol 70 % solution Take 15 ml via g-tube in the morning to maintain bowel regularity THERAVITE PO Take  by mouth. * Notice: This list has 4 medication(s) that are the same as other medications prescribed for you. Read the directions carefully, and ask your doctor or other care provider to review them with you. Follow-up Instructions Return if symptoms worsen or fail to improve. To-Do List   
 04/06/2018 Microbiology:  CULTURE, GENITAL Introducing [facility] SERVICES! Dear Aries Drafts: Thank you for requesting a [employer] account. Our records indicate that you already have an active [employer] account. You can access your account anytime at [email or web address] Did you know that you can access your hospital and ER discharge instructions at any time in [employer]? You can also review all of your test results from your hospital stay or ER visit. Additional Information If you have questions, please visit the Frequently Asked Questions section of the [employer] website at [email or web address] Remember, [employer] is NOT to be used for urgent needs. For medical emergencies, dial 911. Now available from your iPhone and Android! Please provide this summary of care documentation to your next provider. Your primary care clinician is listed as Von Needs. If you have any questions after today's visit, please call [phone].

## 2018-04-09 ENCOUNTER — TELEPHONE (OUTPATIENT)
Dept: FAMILY MEDICINE CLINIC | Age: 48
End: 2018-04-09

## 2018-04-09 LAB
BACTERIA SPEC CULT: ABNORMAL
SERVICE CMNT-IMP: ABNORMAL

## 2018-04-09 RX ORDER — CIPROFLOXACIN 250 MG/1
250 TABLET, FILM COATED ORAL EVERY 12 HOURS
Qty: 14 TAB | Refills: 0 | Status: SHIPPED | OUTPATIENT
Start: 2018-04-09 | End: 2018-04-16

## 2018-04-09 NOTE — PROGRESS NOTES
Called and spoke with someone at [address] [place] [address], gave result note and refill encounter started for meds to be sent in.

## 2018-04-09 NOTE — TELEPHONE ENCOUNTER
Patient can not take the Cipro medication by mouth. Patient is needing to have orders stating \"to crush the medication and to give through GI tube. \"      Please fax the order to [phone]

## 2018-04-10 RX ORDER — CEFDINIR 250 MG/5ML
300 POWDER, FOR SUSPENSION ORAL 2 TIMES DAILY
Qty: 120 ML | Refills: 0 | Status: SHIPPED | OUTPATIENT
Start: 2018-04-10 | End: 2018-04-20

## 2018-04-18 RX ORDER — CALCITONIN SALMON 200 [IU]/.09ML
SPRAY, METERED NASAL
Qty: 3.7 ML | Refills: 0 | Status: SHIPPED | OUTPATIENT
Start: 2018-04-18 | End: 2018-05-17 | Stop reason: SDUPTHER

## 2018-04-18 RX ORDER — BACLOFEN 20 MG/1
TABLET ORAL
Qty: 90 TAB | Refills: 0 | Status: SHIPPED | OUTPATIENT
Start: 2018-04-18 | End: 2018-04-26 | Stop reason: SDUPTHER

## 2018-04-19 ENCOUNTER — TELEPHONE (OUTPATIENT)
Dept: FAMILY MEDICINE CLINIC | Age: 48
End: 2018-04-19

## 2018-04-19 RX ORDER — CEFDINIR 250 MG/5ML
300 POWDER, FOR SUSPENSION ORAL 2 TIMES DAILY
Qty: 12 ML | Refills: 0 | Status: SHIPPED | OUTPATIENT
Start: 2018-04-19 | End: 2018-04-20

## 2018-04-19 NOTE — TELEPHONE ENCOUNTER
Patient's caregiver states that patient is short two doses, somehow, on his Omnicef. She states that he should be taking a dose this evening, and one tomorrow morning that would complete the 10 days. She asked the pharmacy for the remaining two doses, and she was instructed to contact the physician to send another prescription in. She is concerned and requesting a phone call because he will be missing two doses.

## 2018-04-20 ENCOUNTER — OFFICE VISIT (OUTPATIENT)
Dept: FAMILY MEDICINE CLINIC | Age: 48
End: 2018-04-20

## 2018-04-20 VITALS
OXYGEN SATURATION: 95 % | RESPIRATION RATE: 22 BRPM | SYSTOLIC BLOOD PRESSURE: 101 MMHG | DIASTOLIC BLOOD PRESSURE: 73 MMHG | HEART RATE: 66 BPM

## 2018-04-20 DIAGNOSIS — B37.2 CHRONIC MUCOCUTANEOUS CANDIDIASIS: Primary | ICD-10-CM

## 2018-04-20 RX ORDER — CEFDINIR 250 MG/5ML
300 POWDER, FOR SUSPENSION ORAL 2 TIMES DAILY
Qty: 120 ML | Refills: 0 | OUTPATIENT
Start: 2018-04-20 | End: 2018-04-30

## 2018-04-20 RX ORDER — MUPIROCIN CALCIUM 20 MG/G
CREAM TOPICAL 2 TIMES DAILY
Qty: 15 G | Refills: 0 | Status: SHIPPED | OUTPATIENT
Start: 2018-04-20

## 2018-04-20 RX ORDER — NYSTATIN 100000 [USP'U]/G
POWDER TOPICAL
Qty: 60 G | Refills: 3 | Status: SHIPPED | OUTPATIENT
Start: 2018-04-20

## 2018-04-20 NOTE — PROGRESS NOTES
HISTORY OF PRESENT ILLNESS  Rissa Fernandes is a 52 y.o. male. HPI  Recent urethritis treated  C/o candida skin infections  Review of Systems   Skin: Positive for rash. All other systems reviewed and are negative. Past Medical History:   Diagnosis Date    [facility] ([facility]     Scoliosis     Seizures ([facility]     Thyroid disease      Current Outpatient Prescriptions on File Prior to Visit   Medication Sig Dispense Refill    cefdinir (OMNICEF) 250 mg/5 mL suspension Take 6 mL by mouth two (2) times a day for 1 day. 12 mL 0    calcitonin, salmon, (MIACALCIN) nasal USE 1 SPRAY IN ONE NOSTRIL DAILY FOR OSTEOPOROSIS - ALTERNATE NOSTRILS DAILY - **REFRIGERATE UNTIL OPENED;THEN STORE AT ROOM TEMP.;DISCARD 3 3.7 mL 0    baclofen (LIORESAL) 20 mg tablet TAKE 1 TAB VIA G-TUBE 3 TIMES DAILY FOR SPASTICITY - CRUSH AND DISSOLVE IN H2O 90 Tab 0    cefdinir (OMNICEF) 250 mg/5 mL suspension Take 6 mL by mouth two (2) times a day for 10 days. 120 mL 0    levETIRAcetam (KEPPRA) 100 mg/mL solution 5 ml twice a day 1000 mL 3    levothyroxine (SYNTHROID) 25 mcg tablet Take 1 tab via g-tube once daily   Crush and dissolve in h20 30 Tab 0    multivit-min-FA-lycopen-lutein (CENTRUM SILVER) 0.4-300-250 mg-mcg-mcg tab Take 1 Tab by mouth daily. Crushed via G-tube 100 Tab 3    calcium-vitamin D (OYSTER SHELL) 500 mg(1,250mg) -200 unit per tablet Take 1 Tab by mouth two (2) times daily (with meals). 60 Tab 0    sorbitol 70 % solution Take 15 ml via g-tube in the morning to maintain bowel regularity 474 mL 0    loratadine (CLARITIN) 10 mg tablet Take 1 Tab by mouth daily as needed for Allergies.  90 Tab 3    diazePAM (VALIUM) 10 mg tablet Take 1 hour before eye exam  Crushed and administered through G-tube 1 Tab 3    multivit-min-FA-lycopen-lutein (CERTAVITE SENIOR-ANTIOXIDANT) 0.4-300-250 mg-mcg-mcg tab Certavite Senior Tab  Quantity 30  Crush 1 tablet and give via g-tube once daily 30 Tab 0    OTHER ACO as needed 1 Each 0  OTHER Stander for spine alignment  Strap for toe straightening 1 Each 0    MULTIVITAMIN,THERAPEUTIC (THERAVITE PO) Take  by mouth.  phenytoin (DILANTIN) 50 mg tablet Take  by mouth three (3) times daily.  folic acid-vit Q2-EQF J53 (FOLTX) 2.5-25-2 mg tablet Take 1 Tab by mouth daily.  lacosamide (VIMPAT) 200 mg tab tablet Take 200 mg by mouth two (2) times a day.  LORazepam (INTENSOL) 2 mg/mL concentrated solution Take 1 mg by mouth every eight (8) hours as needed for Anxiety.  Menthol-Zinc Oxide (CALMOSEPTINE) 0.44-20.6 % oint Apply  to affected area.  EUCALYP/ME-SALICYLATE/MEN/THYM (LISTERINE MM) by Mucous Membrane route.  BABY OIL, MINERAL OIL, EX by Apply Externally route. No current facility-administered medications on file prior to visit. Visit Vitals    /73 (BP 1 Location: Right arm, BP Patient Position: Sitting)    Pulse 66    Resp 22    SpO2 95%         Physical Exam   Constitutional: He appears well-developed and well-nourished. No distress. Neurological:   Obtunded  Chronic neurologic changes   Skin: He is not diaphoretic. Vitals reviewed.   reviewed recent labs  Urine cx mixed culture    ASSESSMENT and PLAN  mucocutaneous candidiasis   Plan  Nystatin  F/u as planned

## 2018-04-20 NOTE — TELEPHONE ENCOUNTER
Altabax ointment is not covered by the insurance. It's over $500. Is there a medication that can replace it.    Please return call at [phone]

## 2018-04-20 NOTE — PROGRESS NOTES
Elizabeth Benjamin is a 52 y.o. male (: 1970) presenting to address:    Chief Complaint   Patient presents with    Penile Discharge     follow up   [facility] ED Follow-up     rash        pain scale 0/10       Vitals:    18 1126   BP: 101/73   Pulse: 66   Resp: 22   SpO2: 95%   PainSc:   0 - No pain       Hearing/Vision:   No exam data present    Learning Assessment:   No flowsheet data found. Depression Screening:     PHQ over the last two weeks 2018   PHQ Not Done -   Little interest or pleasure in doing things Not at all   Feeling down, depressed or hopeless Not at all   Total Score PHQ 2 0     Fall Risk Assessment:   No flowsheet data found. Abuse Screening:   No flowsheet data found. Coordination of Care Questionaire:   1. Have you been to the ER, urgent care clinic since your last visit? Hospitalized since your last visit? YES elvira    2. Have you seen or consulted any other health care providers outside of the [address] [place] [address] since your last visit? Include any pap smears or colon screening. NO    Advanced Directive:   1. Do you have an Advanced Directive? NO    2. Would you like information on Advanced Directives?  NO

## 2018-04-20 NOTE — MR AVS SNAPSHOT
[address] [facility] [address] 
[phone] Patient: Fritz Mckeon MRN: [id number] DBE:[birth year] Visit Information Date & Time Provider Department Dept. Phone Encounter #  
 4/20/2018 11:00 AM Kyler Betancourt MD [facility] [phone] [id number] Follow-up Instructions Return in about 3 months (around 7/20/2018). Follow-up and Disposition History Upcoming Health Maintenance Date Due DTaP/Tdap/Td series (1 - Tdap) 11/11/1991 Allergies as of 4/20/2018  Review Complete On: 4/20/2018 By: Kyler Betancourt MD  
  
 Severity Noted Reaction Type Reactions Avelox [Moxifloxacin]  07/01/2016    Seizures Lamictal [Lamotrigine]  07/01/2016    Rash Lidocaine  07/01/2016    Other (comments) Lyrica [Pregabalin]  07/01/2016    Other (comments) Valproic Acid  07/01/2016    Other (comments) Current Immunizations  Never Reviewed Name Date Influenza High Dose Vaccine PF 9/7/2016 11:21 AM  
 Influenza Vaccine (Quad) PF 9/22/2017 12:35 PM  
 Pneumococcal Polysaccharide (PPSV-23) 10/20/2017 11:12 AM  
  
 Not reviewed this visit You Were Diagnosed With   
  
 Codes Comments Chronic mucocutaneous candidiasis    -  Primary ICD-10-CM: B37.2 ICD-9-CM: 112.3 Vitals BP Pulse Resp SpO2 Smoking Status 101/73 (BP 1 Location: Right arm, BP Patient Position: Sitting) 66 22 95% Never Smoker Vitals History Preferred Pharmacy Pharmacy Name [facility], [address] [place] [address] Your Updated Medication List  
  
   
This list is accurate as of 4/20/18 12:00 PM.  Always use your most recent med list.  
  
  
  
  
 BABY OIL (MINERAL OIL) EX  
by Apply Externally route. baclofen 20 mg tablet Commonly known as:  LIORESAL  
 TAKE 1 TAB VIA G-TUBE 3 TIMES DAILY FOR SPASTICITY - CRUSH AND DISSOLVE IN H2O  
  
 calcitonin (salmon) nasal  
Commonly known as:  MIACALCIN  
USE 1 SPRAY IN ONE NOSTRIL DAILY FOR OSTEOPOROSIS - ALTERNATE NOSTRILS DAILY - **REFRIGERATE UNTIL OPENED;THEN STORE AT ROOM TEMP.;DISCARD 3  
  
 calcium-vitamin D 500 mg(1,250mg) -200 unit per tablet Commonly known as:  OYSTER SHELL Take 1 Tab by mouth two (2) times daily (with meals). * cefdinir 250 mg/5 mL suspension Commonly known as:  OMNICEF Take 6 mL by mouth two (2) times a day for 10 days. * cefdinir 250 mg/5 mL suspension Commonly known as:  OMNICEF Take 6 mL by mouth two (2) times a day for 1 day. diazePAM 10 mg tablet Commonly known as:  VALIUM Take 1 hour before eye exam Crushed and administered through G-tube  
  
 folic acid-vit P5-GOD T63 2.5-25-2 mg tablet Commonly known as:  Laura Hutsonler Take 1 Tab by mouth daily. lacosamide 200 mg Tab tablet Commonly known as:  VIMPAT Take 200 mg by mouth two (2) times a day. levETIRAcetam 100 mg/mL solution Commonly known as:  KEPPRA 5 ml twice a day  
  
 levothyroxine 25 mcg tablet Commonly known as:  SYNTHROID Take 1 tab via g-tube once daily  Crush and dissolve in h20 LISTERINE MM  
by Mucous Membrane route.  
  
 loratadine 10 mg tablet Commonly known as:  Curlee Arms Take 1 Tab by mouth daily as needed for Allergies. LORazepam 2 mg/mL concentrated solution Commonly known as:  INTENSOL Take 1 mg by mouth every eight (8) hours as needed for Anxiety. Menthol-Zinc Oxide 0.44-20.6 % Oint Commonly known as:  Calmoseptine Apply  to affected area. * multivit-min-FA-lycopen-lutein 0.4-300-250 mg-mcg-mcg Tab Commonly known as:  Tank Luna Certavite Senior Tab Quantity 30 Crush 1 tablet and give via g-tube once daily * multivit-min-FA-lycopen-lutein 0.4-300-250 mg-mcg-mcg Tab Commonly known as:  CENTRUM SILVER Take 1 Tab by mouth daily. Crushed via G-tube  
  
 nystatin powder Commonly known as:  MYCOSTATIN Apply to affected area twice daily with diaper change * OTHER Stander for spine alignment Strap for toe straightening * OTHER  
ACO as needed  
  
 phenytoin 50 mg chewable tablet Commonly known as:  DILANTIN Take  by mouth three (3) times daily. Retapamulin 1 % ointment Commonly known as:  ALTABAX Apply  to affected area two (2) times a day. sorbitol 70 % solution Take 15 ml via g-tube in the morning to maintain bowel regularity THERAVITE PO Take  by mouth. * Notice: This list has 6 medication(s) that are the same as other medications prescribed for you. Read the directions carefully, and ask your doctor or other care provider to review them with you. Prescriptions Sent to Pharmacy Refills  
 nystatin (MYCOSTATIN) powder 3 Sig: Apply to affected area twice daily with diaper change Class: Normal  
 Pharmacy[facility] Ph #: [phone] Retapamulin (ALTABAX) 1 % ointment 0 Sig: Apply  to affected area two (2) times a day. Class: Normal  
 Pharmacy: [facility] Ph #: [phone] Route: Topical  
  
Follow-up Instructions Return in about 3 months (around 7/20/2018). Introducing [facility]! Dear Vernon Choi: Thank you for requesting a [employer] account. Our records indicate that you already have an active [employer] account. You can access your account anytime at [email or web address] Did you know that you can access your hospital and ER discharge instructions at any time in [employer]? You can also review all of your test results from your hospital stay or ER visit. Additional Information If you have questions, please visit the Frequently Asked Questions section of the [employer] website at [email or web address] Remember, [employer] is NOT to be used for urgent needs. For medical emergencies, dial 911. Now available from your iPhone and Android! Please provide this summary of care documentation to your next provider. Your primary care clinician is listed as Corrie Mitchell. If you have any questions after today's visit, please call [phone].

## 2018-04-27 RX ORDER — BACLOFEN 20 MG/1
TABLET ORAL
Qty: 2 TAB | Refills: 0 | Status: SHIPPED | OUTPATIENT
Start: 2018-04-27 | End: 2018-05-17 | Stop reason: SDUPTHER

## 2018-05-03 RX ORDER — MENTHOL/ZINC OXIDE 0.44-20.6%
OINTMENT (GRAM) TOPICAL
Qty: 113 G | Refills: 0 | Status: SHIPPED | OUTPATIENT
Start: 2018-05-03 | End: 2018-07-12 | Stop reason: SDUPTHER

## 2018-05-11 ENCOUNTER — TELEPHONE (OUTPATIENT)
Dept: FAMILY MEDICINE CLINIC | Age: 48
End: 2018-05-11

## 2018-05-11 NOTE — TELEPHONE ENCOUNTER
Pt was seen in urgent care for fracture right knee, knee was raw and hot to touch. Pt was prescribed Keflex and Celine Whalen his RN would like pt taken off medication Keflex. It was hard to decipher  what Celine Whalen was saying due to heavy accent. Celine Whalen states she will fax over information regarding concern on pt.

## 2018-05-17 RX ORDER — CALCITONIN SALMON 200 [IU]/.09ML
SPRAY, METERED NASAL
Qty: 3.7 ML | Refills: 0 | Status: SHIPPED | OUTPATIENT
Start: 2018-05-17 | End: 2018-06-21 | Stop reason: SDUPTHER

## 2018-05-18 RX ORDER — BACLOFEN 20 MG/1
TABLET ORAL
Qty: 90 TAB | Refills: 0 | Status: SHIPPED | OUTPATIENT
Start: 2018-05-18 | End: 2018-06-07 | Stop reason: SDUPTHER

## 2018-06-01 ENCOUNTER — TELEPHONE (OUTPATIENT)
Dept: FAMILY MEDICINE CLINIC | Age: 48
End: 2018-06-01

## 2018-06-01 RX ORDER — DICLOFENAC SODIUM 10 MG/G
GEL TOPICAL 4 TIMES DAILY
Qty: 500 G | Refills: 0 | Status: SHIPPED | OUTPATIENT
Start: 2018-06-01

## 2018-06-01 NOTE — TELEPHONE ENCOUNTER
Patients nurse called and states patient has some mild swelling and indications of pain, grimacing, when when right knee is manipulated   They are requesting a topical pain relief cream for him. Please advise.          Fax [phone]

## 2018-06-07 RX ORDER — BACLOFEN 20 MG/1
TABLET ORAL
Qty: 90 TAB | Refills: 0 | Status: SHIPPED | OUTPATIENT
Start: 2018-06-07 | End: 2018-06-26 | Stop reason: SDUPTHER

## 2018-06-14 DIAGNOSIS — G80.8 OTHER CEREBRAL PALSY (HCC): Primary | ICD-10-CM

## 2018-06-15 RX ORDER — DIAZEPAM 10 MG/1
TABLET ORAL
Qty: 1 TAB | Refills: 3 | Status: SHIPPED | OUTPATIENT
Start: 2018-06-15 | End: 2018-06-22 | Stop reason: SDUPTHER

## 2018-06-21 RX ORDER — CALCITONIN SALMON 200 [IU]/.09ML
SPRAY, METERED NASAL
Qty: 3.7 ML | Refills: 0 | Status: SHIPPED | OUTPATIENT
Start: 2018-06-21 | End: 2018-07-17 | Stop reason: SDUPTHER

## 2018-06-22 DIAGNOSIS — G80.8 OTHER CEREBRAL PALSY (HCC): ICD-10-CM

## 2018-06-22 RX ORDER — DIAZEPAM 10 MG/1
TABLET ORAL
Qty: 1 TAB | Refills: 3 | Status: SHIPPED | OUTPATIENT
Start: 2018-06-22 | End: 2020-02-20 | Stop reason: SDUPTHER

## 2018-06-22 NOTE — TELEPHONE ENCOUNTER
Pt home care nurse is requesting a prescription refill of Diazepam for an future dental appt for pt on 6/27/18. Please advise.

## 2018-06-26 DIAGNOSIS — G80.8 OTHER CEREBRAL PALSY (HCC): ICD-10-CM

## 2018-06-26 RX ORDER — DIAZEPAM 10 MG/1
TABLET ORAL
Qty: 1 TAB | Refills: 0 | Status: CANCELLED | OUTPATIENT
Start: 2018-06-26

## 2018-06-26 RX ORDER — BACLOFEN 20 MG/1
TABLET ORAL
Qty: 90 TAB | Refills: 0 | Status: SHIPPED | OUTPATIENT
Start: 2018-06-26 | End: 2018-07-17 | Stop reason: SDUPTHER

## 2018-06-26 RX ORDER — DIAZEPAM 10 MG/1
TABLET ORAL
Qty: 1 TAB | Refills: 3 | Status: SHIPPED | OUTPATIENT
Start: 2018-06-26 | End: 2020-02-21 | Stop reason: SDUPTHER

## 2018-06-26 NOTE — TELEPHONE ENCOUNTER
Nurse called and states they need valium for dental appointment, what we sent was for an eye appointment and she states she cant use the valium for eye appointment for dental appointment. Please advise.

## 2018-07-12 RX ORDER — MENTHOL/ZINC OXIDE 0.44-20.6%
OINTMENT (GRAM) TOPICAL
Qty: 113 G | Refills: 0 | Status: SHIPPED | OUTPATIENT
Start: 2018-07-12 | End: 2018-08-01 | Stop reason: SDUPTHER

## 2018-07-17 RX ORDER — FOLIC ACID-PYRIDOXINE-CYANOCOBALAMIN TAB 2.5-25-2 MG 2.5-25-2 MG
TAB ORAL
Qty: 1 TAB | Refills: 0 | Status: SHIPPED | OUTPATIENT
Start: 2018-07-17 | End: 2018-08-01 | Stop reason: SDUPTHER

## 2018-07-17 RX ORDER — CALCITONIN SALMON 200 [IU]/.09ML
SPRAY, METERED NASAL
Qty: 3.7 ML | Refills: 0 | Status: SHIPPED | OUTPATIENT
Start: 2018-07-17 | End: 2018-08-01 | Stop reason: SDUPTHER

## 2018-07-17 RX ORDER — BACLOFEN 20 MG/1
TABLET ORAL
Qty: 90 TAB | Refills: 0 | Status: SHIPPED | OUTPATIENT
Start: 2018-07-17 | End: 2018-08-01 | Stop reason: SDUPTHER

## 2018-07-31 ENCOUNTER — HOSPITAL ENCOUNTER (OUTPATIENT)
Dept: LAB | Age: 48
Discharge: HOME | End: 2018-07-31
Payer: MEDICAID

## 2018-07-31 ENCOUNTER — OFFICE VISIT (OUTPATIENT)
Dept: FAMILY MEDICINE CLINIC | Age: 48
End: 2018-07-31

## 2018-07-31 VITALS
OXYGEN SATURATION: 95 % | SYSTOLIC BLOOD PRESSURE: 137 MMHG | HEART RATE: 102 BPM | RESPIRATION RATE: 21 BRPM | DIASTOLIC BLOOD PRESSURE: 77 MMHG

## 2018-07-31 DIAGNOSIS — D69.6 THROMBOCYTOPENIA (HCC): ICD-10-CM

## 2018-07-31 DIAGNOSIS — Z76.89 ESTABLISHING CARE WITH NEW DOCTOR, ENCOUNTER FOR: ICD-10-CM

## 2018-07-31 DIAGNOSIS — Z02.89 ENCOUNTER FOR COMPLETION OF FORM WITH PATIENT: Primary | ICD-10-CM

## 2018-07-31 LAB
ERYTHROCYTE [DISTWIDTH] IN BLOOD BY AUTOMATED COUNT: 13.3 % (ref 11.6–14.5)
HCT VFR BLD AUTO: 44 % (ref 36–48)
HGB BLD-MCNC: 15.1 G/DL (ref 13–16)
MCH RBC QN AUTO: 31.7 PG (ref 24–34)
MCHC RBC AUTO-ENTMCNC: 34.3 G/DL (ref 31–37)
MCV RBC AUTO: 92.2 FL (ref 74–97)
PLATELET # BLD AUTO: 147 K/UL (ref 135–420)
PMV BLD AUTO: 11.6 FL (ref 9.2–11.8)
RBC # BLD AUTO: 4.77 M/UL (ref 4.7–5.5)
WBC # BLD AUTO: 4.4 K/UL (ref 4.6–13.2)

## 2018-07-31 PROCEDURE — 85027 COMPLETE CBC AUTOMATED: CPT | Performed by: NURSE PRACTITIONER

## 2018-07-31 PROCEDURE — 36415 COLL VENOUS BLD VENIPUNCTURE: CPT | Performed by: NURSE PRACTITIONER

## 2018-07-31 NOTE — PROGRESS NOTES
Brian Singh is a 52 y.o. male (: 1970) presenting to address:Form completion needed     Chief Complaint   Patient presents with    Form Completion       Vitals:    18 1132   BP: 137/77   Pulse: (!) 102   Resp: 21   SpO2: 95%   PainSc:   0 - No pain       Hearing/Vision:   No exam data present    Learning Assessment:   No flowsheet data found. Depression Screening:     PHQ over the last two weeks 2018   PHQ Not Done -   Little interest or pleasure in doing things Not at all   Feeling down, depressed, irritable, or hopeless Not at all   Total Score PHQ 2 0     Fall Risk Assessment:   No flowsheet data found. Abuse Screening:   No flowsheet data found. Coordination of Care Questionaire:   1. Have you been to the ER, urgent care clinic since your last visit? Hospitalized since your last visit? no    2. Have you seen or consulted any other health care providers outside of the [facility] since your last visit? Include any pap smears or colon screening. no    Advanced Directive:   1. Do you have an Advanced Directive? no    2. Would you like information on Advanced Directives?  no

## 2018-07-31 NOTE — PROGRESS NOTES
Gemma Alvarenga is a 52 y.o.  male and presents with    Chief Complaint   Patient presents with    Form Completion         Subjective:  Patient presents to establish care and for form completion to transfer care. No c/o today stable. No refills needed. Also hx of low platelets requesting labs draw today. Current Outpatient Prescriptions   Medication Sig Dispense Refill    FOLBIC 2.5-25-2 mg tablet TAKE 1 TAB VIA G-TUBE 2 TIMES DAILY FOR FOLATE DEFICIENCY - CRUSH ANDDISSOLVE IN H2O (2/2.5/25) 1 Tab 0    calcitonin, salmon, (MIACALCIN) nasal USE 1 SPRAY IN ONE NOSTRIL DAILY FOR OSTEOPOROSIS - ALTERNATE NOSTRILS DAILY - **REFRIGERATE UNTIL OPENED;THEN STORE AT ROOM TEMP.;DISCARD 3 3.7 mL 0    baclofen (LIORESAL) 20 mg tablet TAKE 1 TAB VIA G-TUBE 3 TIMES DAILY FOR SPASTICITY - CRUSH AND DISSOLVE IN H2O 90 Tab 0    CALMOSEPTINE 0.44-20.6 % oint APPLY TOPICALLY TO G-TUBE SITE DAILY TO PREVENT OVERGROWTH AND IRRITATION AT SITE 113 g 0    diazePAM (VALIUM) 10 mg tablet Take 1 hour before dental exam  Crushed and administered through G-tube 1 Tab 3    diazePAM (VALIUM) 10 mg tablet Take 1 hour before eye exam  Crushed and administered through G-tube 1 Tab 3    diclofenac (VOLTAREN) 1 % gel Apply  to affected area four (4) times daily. 500 g 0    nystatin (MYCOSTATIN) powder Apply to affected area twice daily with diaper change 60 g 3    Retapamulin (ALTABAX) 1 % ointment Apply  to affected area two (2) times a day. 30 g 0    mupirocin calcium (BACTROBAN) 2 % topical cream Apply  to affected area two (2) times a day. 15 g 0    levETIRAcetam (KEPPRA) 100 mg/mL solution 5 ml twice a day 1000 mL 3    levothyroxine (SYNTHROID) 25 mcg tablet Take 1 tab via g-tube once daily   Crush and dissolve in h20 30 Tab 0    multivit-min-FA-lycopen-lutein (CENTRUM SILVER) 0.4-300-250 mg-mcg-mcg tab Take 1 Tab by mouth daily.  Crushed via G-tube 100 Tab 3    calcium-vitamin D (OYSTER SHELL) 500 mg(1,250mg) -200 unit per tablet Take 1 Tab by mouth two (2) times daily (with meals). 60 Tab 0    sorbitol 70 % solution Take 15 ml via g-tube in the morning to maintain bowel regularity 474 mL 0    loratadine (CLARITIN) 10 mg tablet Take 1 Tab by mouth daily as needed for Allergies. 90 Tab 3    multivit-min-FA-lycopen-lutein (CERTAVITE SENIOR-ANTIOXIDANT) 0.4-300-250 mg-mcg-mcg tab Certavite Senior Tab  Quantity 30  Crush 1 tablet and give via g-tube once daily 30 Tab 0    OTHER ACO as needed 1 Each 0    OTHER Stander for spine alignment  Strap for toe straightening 1 Each 0    MULTIVITAMIN,THERAPEUTIC (THERAVITE PO) Take  by mouth.  phenytoin (DILANTIN) 50 mg tablet Take  by mouth three (3) times daily.  lacosamide (VIMPAT) 200 mg tab tablet Take 200 mg by mouth two (2) times a day.  LORazepam (INTENSOL) 2 mg/mL concentrated solution Take 1 mg by mouth every eight (8) hours as needed for Anxiety.  EUCALYP/ME-SALICYLATE/MEN/THYM (LISTERINE MM) by Mucous Membrane route.  BABY OIL, MINERAL OIL, EX by Apply Externally route. Allergies   Allergen Reactions    Avelox [Moxifloxacin] Seizures    Lamictal [Lamotrigine] Rash    Lidocaine Other (comments)    Lyrica [Pregabalin] Other (comments)    Valproic Acid Other (comments)       ROS  All systems reviewed and are negative. No c/o today stable      Objective:  Vitals:    07/31/18 1132   BP: 137/77   Pulse: (!) 102   Resp: 21   SpO2: 95%   PainSc:   0 - No pain     General:   Well-groomed, well-nourished, in no distress, pleasant, alert, appropriate    Cardiovasc:   No JVD. RRR,  no murmur, rubs or gallops. Pulmonary:    Lungs clear bilaterally, no wheezing, rales or rhonchi. Assessment/Plan:      Establishing care with new doctor, encounter for  Encounter for completion of form with patient  Thrombocytopenia     Patient stable no c/o today. Forms completed for transfer of care. Will draw cbc to recheck platelets.       I have discussed the diagnosis with the patient and the intended plan as seen in the above orders. The patient has received an after-visit summary and questions were answered concerning future plans. I have discussed medication side effects and warnings with the patient as well. I have reviewed the plan of care with the patient, accepted their input and they are in agreement with the treatment goals. Follow-up Disposition:  Return if symptoms worsen or fail to improve. More than 1/2 of this 30 minute visit was spent in counselling and coordination of care, as described above.     Spencer CRANDALL-BC

## 2018-08-01 RX ORDER — CALCITONIN SALMON 200 [IU]/.09ML
SPRAY, METERED NASAL
Qty: 3.7 ML | Refills: 0 | Status: SHIPPED | OUTPATIENT
Start: 2018-08-01 | End: 2018-12-07 | Stop reason: SDUPTHER

## 2018-08-01 RX ORDER — MENTHOL AND ZINC OXIDE .44; 20.625 G/100G; G/100G
OINTMENT TOPICAL
Qty: 113 G | Refills: 0 | Status: SHIPPED | OUTPATIENT
Start: 2018-08-01 | End: 2019-01-08 | Stop reason: SDUPTHER

## 2018-08-01 RX ORDER — BACLOFEN 20 MG/1
TABLET ORAL
Qty: 90 TAB | Refills: 0 | Status: SHIPPED | OUTPATIENT
Start: 2018-08-01 | End: 2018-09-10 | Stop reason: SDUPTHER

## 2018-08-01 NOTE — TELEPHONE ENCOUNTER
[facility] pharmacy would like these prescriptions filled today, 8/1/18 to have rx packaged and sent to pt. She states she has been faxing information since 7/11/18 on this concern and states she will be sending a fax with that information over again today. Pt now a pt of SCOTT Hernandez, Please advise.

## 2018-08-01 NOTE — TELEPHONE ENCOUNTER
Orders Placed This Encounter    baclofen (LIORESAL) 20 mg tablet     Sig: TAKE 1 TAB VIA G-TUBE 3 TIMES DAILY FOR SPASTICITY - CRUSH AND DISSOLVE IN H2O     Dispense:  90 Tab     Refill:  0    calcitonin, salmon, (MIACALCIN) nasal     Sig: USE 1 SPRAY IN ONE NOSTRIL DAILY FOR OSTEOPOROSIS - ALTERNATE NOSTRILS DAILY - **REFRIGERATE UNTIL OPENED;THEN STORE AT ROOM TEMP.;DISCARD 3     Dispense:  3.7 mL     Refill:  0    folic acid-vit W9-SFQ L41 (FOLBIC) 2.5-25-2 mg tablet     Sig: TAKE 1 TAB VIA G-TUBE 2 TIMES DAILY FOR FOLATE DEFICIENCY - CRUSH ANDDISSOLVE IN H2O (2/2.5/25)     Dispense:  180 Tab     Refill:  0    Menthol-Zinc Oxide (CALMOSEPTINE) 0.44-20.6 % oint     Sig: APPLY TOPICALLY TO G-TUBE SITE DAILY TO PREVENT OVERGROWTH AND IRRITATION AT SITE     Dispense:  113 g     Refill:  0         Future Appointments  Date Time Provider Emigdio Henry   10/30/2018 3:00 PM Oscar Carreno, SCOTT [address] [place] [address]

## 2018-09-06 ENCOUNTER — TELEPHONE (OUTPATIENT)
Dept: FAMILY MEDICINE CLINIC | Age: 48
End: 2018-09-06

## 2018-09-06 NOTE — TELEPHONE ENCOUNTER
[facility] [address] called for a script for an antiinflammatory medication to be sent to [facility]. Pt uses equipment for posture, stander, and states when pt stands inside his knees become red and warm due to use causing pain and irritation. Inocencia would like a call back as soon as possible in regards to script, please advise.

## 2018-09-07 RX ORDER — IBUPROFEN 600 MG/1
600 TABLET ORAL
Qty: 90 TAB | Refills: 0 | Status: SHIPPED | OUTPATIENT
Start: 2018-09-07 | End: 2018-09-13 | Stop reason: SDUPTHER

## 2018-09-10 RX ORDER — PHENYTOIN 50 MG/1
TABLET, CHEWABLE ORAL
Qty: 42 TAB | Refills: 0 | Status: CANCELLED | OUTPATIENT
Start: 2018-09-10

## 2018-09-11 RX ORDER — BACLOFEN 20 MG/1
TABLET ORAL
Qty: 36 TAB | Refills: 0 | Status: SHIPPED | OUTPATIENT
Start: 2018-09-11 | End: 2018-09-17 | Stop reason: SDUPTHER

## 2018-09-12 ENCOUNTER — TELEPHONE (OUTPATIENT)
Dept: FAMILY MEDICINE CLINIC | Age: 48
End: 2018-09-12

## 2018-09-12 NOTE — TELEPHONE ENCOUNTER
[facility] called f/u on an rx that was written by FNP. Shannan Pike for ibuprofen back on 9/6/18. It was supposed to be rewritten for a g-tube for his mouth.  Please advise

## 2018-09-13 NOTE — TELEPHONE ENCOUNTER
Shania Magallanes called back in regards to prescription of ibuprofen suppose to be rewritten for g-tube for his mouth, please advise.

## 2018-09-17 RX ORDER — BACLOFEN 20 MG/1
TABLET ORAL
Qty: 5 TAB | Refills: 0 | Status: SHIPPED | OUTPATIENT
Start: 2018-09-17 | End: 2018-10-04 | Stop reason: SDUPTHER

## 2018-09-17 RX ORDER — IBUPROFEN 600 MG/1
600 TABLET ORAL
Qty: 90 TAB | Refills: 0 | Status: SHIPPED | OUTPATIENT
Start: 2018-09-17

## 2018-09-25 NOTE — TELEPHONE ENCOUNTER
Last ov 7-31-18  Future Appointments  Date Time Provider Emigdio Henry   10/30/2018 3:00 PM Kasia Eason  [facility]

## 2018-09-27 RX ORDER — PHENYTOIN 50 MG/1
50 TABLET, CHEWABLE ORAL 3 TIMES DAILY
Qty: 90 TAB | OUTPATIENT
Start: 2018-09-27

## 2018-09-27 NOTE — TELEPHONE ENCOUNTER
There are 2 medication doses listed in this pt chart please clarify also had gtube?  So is this po or per gtube? lpn to call and find out

## 2018-09-28 RX ORDER — PHENYTOIN 50 MG/1
TABLET, CHEWABLE ORAL
Qty: 109 TAB | Refills: 1 | Status: SHIPPED | OUTPATIENT
Start: 2018-09-28 | End: 2018-12-07 | Stop reason: SDUPTHER

## 2018-09-28 NOTE — TELEPHONE ENCOUNTER
Called and spoke with the nurse at [facility] she states pt takes the medication as follows: Odd days 50mg (1 tablet) in the morning and 100mg (2 tablets) in the evening via gtube. Even days 100mg (2 tablets) twice daily via gtube. Pt gets the chewables tablets so that it can be crushed and given via gtube.

## 2018-10-05 ENCOUNTER — CLINICAL SUPPORT (OUTPATIENT)
Dept: FAMILY MEDICINE CLINIC | Age: 48
End: 2018-10-05

## 2018-10-05 VITALS — WEIGHT: 126 LBS | TEMPERATURE: 98.2 F

## 2018-10-05 DIAGNOSIS — Z23 ENCOUNTER FOR IMMUNIZATION: Primary | ICD-10-CM

## 2018-10-05 RX ORDER — BACLOFEN 20 MG/1
TABLET ORAL
Qty: 93 TAB | Refills: 0 | Status: SHIPPED | OUTPATIENT
Start: 2018-10-05 | End: 2018-11-06 | Stop reason: SDUPTHER

## 2018-10-05 NOTE — PROGRESS NOTES
Cheryl Roman is a 52 y.o. male (: 1970) presenting to address:flu vaccine    Chief Complaint   Patient presents with    Immunization/Injection     flu vaccine       Vitals:    10/05/18 1140   Temp: 98.2 °F (36.8 °C)   TempSrc: Oral   Weight: 126 lb (57.2 kg)   PainSc:   0 - No pain       Hearing/Vision:   No exam data present    Learning Assessment:   No flowsheet data found. Depression Screening:     PHQ over the last two weeks 2018   PHQ Not Done -   Little interest or pleasure in doing things Not at all   Feeling down, depressed, irritable, or hopeless Not at all   Total Score PHQ 2 0     Fall Risk Assessment:   No flowsheet data found. Abuse Screening:   No flowsheet data found. Coordination of Care Questionaire:   1. Have you been to the ER, urgent care clinic since your last visit? Hospitalized since your last visit? no    2. Have you seen or consulted any other health care providers outside of the [address] [place] [address] since your last visit? Include any pap smears or colon screening. no    Advanced Directive:   1. Do you have an Advanced Directive? no    2. Would you like information on Advanced Directives? No    After obtaining informed consent, the immunization is given by LAKIA Marshall LPN.

## 2018-10-05 NOTE — PATIENT INSTRUCTIONS
Vaccine Information Statement    Influenza (Flu) Vaccine (Inactivated or Recombinant): What you need to know    Many Vaccine Information Statements are available in Romansh and other languages. See www.immunize.org/vis  Hojas de Información Sobre Vacunas están disponibles en Español y en muchos otros idiomas. Visite www.immunize.org/vis    1. Why get vaccinated? Influenza (flu) is a contagious disease that spreads around the [place] every year, usually between October and May. Flu is caused by influenza viruses, and is spread mainly by coughing, sneezing, and close contact. Anyone can get flu. Flu strikes suddenly and can last several days. Symptoms vary by age, but can include:   fever/chills   sore throat   muscle aches   fatigue   cough   headache    runny or stuffy nose    Flu can also lead to pneumonia and blood infections, and cause diarrhea and seizures in children. If you have a medical condition, such as heart or lung disease, flu can make it worse. Flu is more dangerous for some people. Infants and young children, people 72years of age and older, pregnant women, and people with certain health conditions or a weakened immune system are at greatest risk. Each year thousands of people in the [facility] die from flu, and many more are hospitalized. Flu vaccine can:   keep you from getting flu,   make flu less severe if you do get it, and   keep you from spreading flu to your family and other people. 2. Inactivated and recombinant flu vaccines    A dose of flu vaccine is recommended every flu season. Children 6 months through 6years of age may need two doses during the same flu season. Everyone else needs only one dose each flu season.        Some inactivated flu vaccines contain a very small amount of a mercury-based preservative called thimerosal. Studies have not shown thimerosal in vaccines to be harmful, but flu vaccines that do not contain thimerosal are available. There is no live flu virus in flu shots. They cannot cause the flu. There are many flu viruses, and they are always changing. Each year a new flu vaccine is made to protect against three or four viruses that are likely to cause disease in the upcoming flu season. But even when the vaccine doesnt exactly match these viruses, it may still provide some protection    Flu vaccine cannot prevent:   flu that is caused by a virus not covered by the vaccine, or   illnesses that look like flu but are not. It takes about 2 weeks for protection to develop after vaccination, and protection lasts through the flu season. 3. Some people should not get this vaccine    Tell the person who is giving you the vaccine:     If you have any severe, life-threatening allergies. If you ever had a life-threatening allergic reaction after a dose of flu vaccine, or have a severe allergy to any part of this vaccine, you may be advised not to get vaccinated. Most, but not all, types of flu vaccine contain a small amount of egg protein.  If you ever had Guillain-Barré Syndrome (also called GBS). Some people with a history of GBS should not get this vaccine. This should be discussed with your doctor.  If you are not feeling well. It is usually okay to get flu vaccine when you have a mild illness, but you might be asked to come back when you feel better. 4. Risks of a vaccine reaction    With any medicine, including vaccines, there is a chance of reactions. These are usually mild and go away on their own, but serious reactions are also possible. Most people who get a flu shot do not have any problems with it.      Minor problems following a flu shot include:    soreness, redness, or swelling where the shot was given     hoarseness   sore, red or itchy eyes   cough   fever   aches   headache   itching   fatigue  If these problems occur, they usually begin soon after the shot and last 1 or 2 days. More serious problems following a flu shot can include the following:     There may be a small increased risk of Guillain-Barré Syndrome (GBS) after inactivated flu vaccine. This risk has been estimated at 1 or 2 additional cases per million people vaccinated. This is much lower than the risk of severe complications from flu, which can be prevented by flu vaccine.  Young children who get the flu shot along with pneumococcal vaccine (PCV13) and/or DTaP vaccine at the same time might be slightly more likely to have a seizure caused by fever. Ask your doctor for more information. Tell your doctor if a child who is getting flu vaccine has ever had a seizure. Problems that could happen after any injected vaccine:      People sometimes faint after a medical procedure, including vaccination. Sitting or lying down for about 15 minutes can help prevent fainting, and injuries caused by a fall. Tell your doctor if you feel dizzy, or have vision changes or ringing in the ears.  Some people get severe pain in the shoulder and have difficulty moving the arm where a shot was given. This happens very rarely.  Any medication can cause a severe allergic reaction. Such reactions from a vaccine are very rare, estimated at about 1 in a million doses, and would happen within a few minutes to a few hours after the vaccination. As with any medicine, there is a very remote chance of a vaccine causing a serious injury or death. The safety of vaccines is always being monitored. For more information, visit: www.cdc.gov/vaccinesafety/    5. What if there is a serious reaction? What should I look for?  Look for anything that concerns you, such as signs of a severe allergic reaction, very high fever, or unusual behavior.     Signs of a severe allergic reaction can include hives, swelling of the face and throat, difficulty breathing, a fast heartbeat, dizziness, and weakness - usually within a few minutes to a few hours after the vaccination. What should I do?  If you think it is a severe allergic reaction or other emergency that cant wait, call 9-1-1 and get the person to the nearest hospital. Otherwise, call your doctor.  Reactions should be reported to the Vaccine Adverse Event Reporting System (VAERS). Your doctor should file this report, or you can do it yourself through  the VAERS web site at www.vaers. [facility].gov, or by calling 5-382.754.9365. VAERS does not give medical advice. 6. The National Vaccine Injury Compensation Program    The [facility] Vaccine Injury Compensation Program (VICP) is a federal program that was created to compensate people who may have been injured by certain vaccines. Persons who believe they may have been injured by a vaccine can learn about the program and about filing a claim by calling 6-663.965.2479 or visiting the [employer] website at www.[facility].gov/vaccinecompensation. There is a time limit to file a claim for compensation. 7. How can I learn more?  Ask your healthcare provider. He or she can give you the vaccine package insert or suggest other sources of information.  Call your local or state health department.  Contact the Centers for Disease Control and Prevention (CDC):  - Call 4-231.681.6304 (1-800-CDC-INFO) or  - Visit CDCs website at www.cdc.gov/flu    Vaccine Information Statement   Inactivated Influenza Vaccine   8/7/2015  [address] K[address] Tatyana Clarek [id number]    Department of Health and Human Services  Centers for Disease Control and Prevention    Office Use Only

## 2018-10-16 NOTE — TELEPHONE ENCOUNTER
Last OV: 07/31/2018 w/ NP Justin Hill  Last filled: Levothyroxine 10/20/17 30 tabs 0 refills                   Sorbitol 10/20/17 0 refills

## 2018-10-17 RX ORDER — LEVOTHYROXINE SODIUM 25 UG/1
TABLET ORAL
Qty: 30 TAB | Refills: 0 | OUTPATIENT
Start: 2018-10-17

## 2018-10-17 RX ORDER — SORBITOL SOLUTION 70 %
SOLUTION, ORAL MISCELLANEOUS
Qty: 474 ML | Refills: 0 | OUTPATIENT
Start: 2018-10-17

## 2018-10-25 RX ORDER — LEVOTHYROXINE SODIUM 25 UG/1
TABLET ORAL
Qty: 30 TAB | Refills: 0 | OUTPATIENT
Start: 2018-10-25

## 2018-10-25 RX ORDER — SORBITOL SOLUTION 70 %
SOLUTION, ORAL MISCELLANEOUS
Qty: 474 ML | Refills: 0 | OUTPATIENT
Start: 2018-10-25

## 2018-10-30 NOTE — TELEPHONE ENCOUNTER
Nurse calling to check on status. Upcoming appt to est care: 11/5/18    Last filled: Sorbitol 10/20/17 0 refills                   Synthroid 10/20/17 30 w/ 0    Patient said to be completely out of medication.

## 2018-11-01 RX ORDER — LEVOTHYROXINE SODIUM 25 UG/1
TABLET ORAL
Qty: 30 TAB | Refills: 0 | OUTPATIENT
Start: 2018-11-01

## 2018-11-01 RX ORDER — SORBITOL SOLUTION 70 %
SOLUTION, ORAL MISCELLANEOUS
Qty: 474 ML | Refills: 0 | OUTPATIENT
Start: 2018-11-01

## 2018-11-01 NOTE — TELEPHONE ENCOUNTER
Due for labs tsh, also no refills last filled 1 year ago he would have been out of medication a year ago.  Need clarification

## 2018-11-05 ENCOUNTER — OFFICE VISIT (OUTPATIENT)
Dept: FAMILY MEDICINE CLINIC | Age: 48
End: 2018-11-05

## 2018-11-05 VITALS
OXYGEN SATURATION: 96 % | SYSTOLIC BLOOD PRESSURE: 101 MMHG | WEIGHT: 126 LBS | HEART RATE: 102 BPM | DIASTOLIC BLOOD PRESSURE: 72 MMHG | TEMPERATURE: 98.2 F | RESPIRATION RATE: 18 BRPM

## 2018-11-05 DIAGNOSIS — R78.89 DILANTIN LEVEL TOO LOW: Primary | ICD-10-CM

## 2018-11-05 DIAGNOSIS — Z00.00 ROUTINE GENERAL MEDICAL EXAMINATION AT A HEALTH CARE FACILITY: ICD-10-CM

## 2018-11-05 DIAGNOSIS — E03.9 ACQUIRED HYPOTHYROIDISM: ICD-10-CM

## 2018-11-05 DIAGNOSIS — R56.9 SEIZURES (HCC): ICD-10-CM

## 2018-11-05 NOTE — PROGRESS NOTES
Kassandra Malone is a 52 y.o. male (: 1970) presenting to address:form completion     Chief Complaint   Patient presents with    Form Completion       Vitals:    18 1444   BP: 101/72   Pulse: (!) 102   Resp: 18   Temp: 98.2 °F (36.8 °C)   TempSrc: Oral   SpO2: 96%   Weight: 126 lb (57.2 kg)   PainSc:   0 - No pain       Hearing/Vision:   No exam data present    Learning Assessment:   No flowsheet data found. Depression Screening:     PHQ over the last two weeks 2018   PHQ Not Done -   Little interest or pleasure in doing things Not at all   Feeling down, depressed, irritable, or hopeless Not at all   Total Score PHQ 2 0     Fall Risk Assessment:   No flowsheet data found. Abuse Screening:   No flowsheet data found. Coordination of Care Questionaire:   1. Have you been to the ER, urgent care clinic since your last visit? Hospitalized since your last visit? no    2. Have you seen or consulted any other health care providers outside of the [address] [place] [address] since your last visit? Include any pap smears or colon screening. no    Advanced Directive:   1. Do you have an Advanced Directive? no    2. Would you like information on Advanced Directives?  no

## 2018-11-06 ENCOUNTER — HOSPITAL ENCOUNTER (OUTPATIENT)
Dept: LAB | Age: 48
Discharge: HOME | End: 2018-11-06
Payer: MEDICAID

## 2018-11-06 DIAGNOSIS — R78.89 DILANTIN LEVEL TOO LOW: ICD-10-CM

## 2018-11-06 DIAGNOSIS — Z00.00 ROUTINE GENERAL MEDICAL EXAMINATION AT A HEALTH CARE FACILITY: ICD-10-CM

## 2018-11-06 DIAGNOSIS — E03.9 ACQUIRED HYPOTHYROIDISM: ICD-10-CM

## 2018-11-06 LAB
ANION GAP SERPL CALC-SCNC: 3 MMOL/L (ref 3–18)
BUN SERPL-MCNC: 8 MG/DL (ref 7–18)
BUN/CREAT SERPL: 9 (ref 12–20)
CALCIUM SERPL-MCNC: 8.4 MG/DL (ref 8.5–10.1)
CHLORIDE SERPL-SCNC: 108 MMOL/L (ref 100–108)
CO2 SERPL-SCNC: 30 MMOL/L (ref 21–32)
CREAT SERPL-MCNC: 0.9 MG/DL (ref 0.6–1.3)
ERYTHROCYTE [DISTWIDTH] IN BLOOD BY AUTOMATED COUNT: 13.6 % (ref 11.6–14.5)
GLUCOSE SERPL-MCNC: 82 MG/DL (ref 74–99)
HCT VFR BLD AUTO: 47.4 % (ref 36–48)
HGB BLD-MCNC: 15.5 G/DL (ref 13–16)
MCH RBC QN AUTO: 30.8 PG (ref 24–34)
MCHC RBC AUTO-ENTMCNC: 32.7 G/DL (ref 31–37)
MCV RBC AUTO: 94.2 FL (ref 74–97)
PHENYTOIN SERPL-MCNC: 14 UG/ML (ref 10–20)
PLATELET # BLD AUTO: 125 K/UL (ref 135–420)
PMV BLD AUTO: 12.2 FL (ref 9.2–11.8)
POTASSIUM SERPL-SCNC: 3.9 MMOL/L (ref 3.5–5.5)
RBC # BLD AUTO: 5.03 M/UL (ref 4.7–5.5)
SODIUM SERPL-SCNC: 141 MMOL/L (ref 136–145)
T4 FREE SERPL-MCNC: 1 NG/DL (ref 0.7–1.5)
TSH SERPL DL<=0.05 MIU/L-ACNC: 1.6 UIU/ML (ref 0.36–3.74)
WBC # BLD AUTO: 3.8 K/UL (ref 4.6–13.2)

## 2018-11-06 PROCEDURE — 84439 ASSAY OF FREE THYROXINE: CPT | Performed by: NURSE PRACTITIONER

## 2018-11-06 PROCEDURE — 80185 ASSAY OF PHENYTOIN TOTAL: CPT | Performed by: NURSE PRACTITIONER

## 2018-11-06 PROCEDURE — 36415 COLL VENOUS BLD VENIPUNCTURE: CPT | Performed by: NURSE PRACTITIONER

## 2018-11-06 PROCEDURE — 85027 COMPLETE CBC AUTOMATED: CPT | Performed by: NURSE PRACTITIONER

## 2018-11-06 PROCEDURE — 80048 BASIC METABOLIC PNL TOTAL CA: CPT | Performed by: NURSE PRACTITIONER

## 2018-11-06 PROCEDURE — 84443 ASSAY THYROID STIM HORMONE: CPT | Performed by: NURSE PRACTITIONER

## 2018-11-07 RX ORDER — BACLOFEN 20 MG/1
TABLET ORAL
Qty: 90 TAB | Refills: 0 | OUTPATIENT
Start: 2018-11-07

## 2018-11-07 RX ORDER — CALCITONIN SALMON 200 [IU]/.09ML
SPRAY, METERED NASAL
Qty: 3.7 ML | Refills: 0 | OUTPATIENT
Start: 2018-11-07

## 2018-11-07 RX ORDER — BACLOFEN 20 MG/1
TABLET ORAL
Qty: 1 TAB | Refills: 0 | Status: SHIPPED | OUTPATIENT
Start: 2018-11-07 | End: 2021-03-29

## 2018-11-07 NOTE — PROGRESS NOTES
Spoke with nurse at group home. They had already received results via [employer]. They will fax labs to their pharmacy per pharmacist request for Dilantin levels.  Pracsteffi Lucero

## 2018-11-08 NOTE — PROGRESS NOTES
Diana Almeida is a 52 y.o.  male and presents with    Chief Complaint   Patient presents with    Form Completion         Subjective:  Patient presents for labs and follow up. Needs dilantin level drawn, also needs thyroid drawn. No new c/o stable no new seizure activity. Caregiver with patient. Current Outpatient Medications   Medication Sig Dispense Refill    phenytoin (DILANTIN) 50 mg chewable tablet Via gtube TAKE 1 TAB IN THE MORNING @0600 AND 2 TABS IN THE EVENING @1900 ON ODD DAYS; AND TAKE 2 TABS 2 TIMES DAILY @ 0600 AND 1900 ON EVEN DAYS;ARIAS 109 Tab 1    ibuprofen (MOTRIN) 600 mg tablet 1 Tab by Per G Tube route every eight (8) hours as needed for Pain. 90 Tab 0    calcitonin, salmon, (MIACALCIN) nasal USE 1 SPRAY IN ONE NOSTRIL DAILY FOR OSTEOPOROSIS - ALTERNATE NOSTRILS DAILY - **REFRIGERATE UNTIL OPENED;THEN STORE AT ROOM TEMP.;DISCARD 3 3.7 mL 0    Menthol-Zinc Oxide (CALMOSEPTINE) 0.44-20.6 % oint APPLY TOPICALLY TO G-TUBE SITE DAILY TO PREVENT OVERGROWTH AND IRRITATION AT SITE 113 g 0    diazePAM (VALIUM) 10 mg tablet Take 1 hour before dental exam  Crushed and administered through G-tube 1 Tab 3    diazePAM (VALIUM) 10 mg tablet Take 1 hour before eye exam  Crushed and administered through G-tube 1 Tab 3    diclofenac (VOLTAREN) 1 % gel Apply  to affected area four (4) times daily. 500 g 0    nystatin (MYCOSTATIN) powder Apply to affected area twice daily with diaper change 60 g 3    Retapamulin (ALTABAX) 1 % ointment Apply  to affected area two (2) times a day. 30 g 0    mupirocin calcium (BACTROBAN) 2 % topical cream Apply  to affected area two (2) times a day. 15 g 0    levETIRAcetam (KEPPRA) 100 mg/mL solution 5 ml twice a day 1000 mL 3    levothyroxine (SYNTHROID) 25 mcg tablet Take 1 tab via g-tube once daily   Crush and dissolve in h20 30 Tab 0    calcium-vitamin D (OYSTER SHELL) 500 mg(1,250mg) -200 unit per tablet Take 1 Tab by mouth two (2) times daily (with meals). 60 Tab 0    sorbitol 70 % solution Take 15 ml via g-tube in the morning to maintain bowel regularity 474 mL 0    loratadine (CLARITIN) 10 mg tablet Take 1 Tab by mouth daily as needed for Allergies. 90 Tab 3    OTHER ACO as needed 1 Each 0    OTHER Stander for spine alignment  Strap for toe straightening 1 Each 0    MULTIVITAMIN,THERAPEUTIC (THERAVITE PO) Take  by mouth.  lacosamide (VIMPAT) 200 mg tab tablet Take 200 mg by mouth two (2) times a day.  LORazepam (INTENSOL) 2 mg/mL concentrated solution Take 1 mg by mouth every eight (8) hours as needed for Anxiety.  EUCALYP/ME-SALICYLATE/MEN/THYM (LISTERINE MM) by Mucous Membrane route.  BABY OIL, MINERAL OIL, EX by Apply Externally route.  baclofen (LIORESAL) 20 mg tablet TAKE 1 TAB VIA G-TUBE 3 TIMES DAILY FOR SPASTICITY - CRUSH AND DISSOLVE IN B1E 1 Tab 0    folic acid-vit A2-LTC M76 (FOLBIC) 2.5-25-2 mg tablet TAKE 1 TAB VIA G-TUBE 2 TIMES DAILY FOR FOLATE DEFICIENCY - CRUSH ANDDISSOLVE IN H2O (2/2.5/25) 60 Tab 0     Allergies   Allergen Reactions    Avelox [Moxifloxacin] Seizures    Lamictal [Lamotrigine] Rash    Lidocaine Other (comments)    Lyrica [Pregabalin] Other (comments)    Valproic Acid Other (comments)       Review of Systems   Neurological: Positive for seizures. All other systems reviewed and are negative. Objective:  Vitals:    11/05/18 1444   BP: 101/72   Pulse: (!) 102   Resp: 18   Temp: 98.2 °F (36.8 °C)   TempSrc: Oral   SpO2: 96%   Weight: 126 lb (57.2 kg)   PainSc:   0 - No pain     General:   Well-groomed, well-nourished, in no distress, pleasant, alert, appropriate    Neck:      Neck supple, no swelling, mass or tenderness or thyromegaly  Cardiovasc:   No JVD. RRR,  no murmur, rubs or gallops. Pulmonary:    Lungs clear bilaterally, no wheezing, rales or rhonchi. Extremities:   No erythema, deformity, edema, or TTP,  LEs warm and well-perfused.   Neuro:            Nonverbal, decreased motor strength in wheelchair        Assessment/Plan:      1. Dilantin level too low  - PHENYTOIN; Future    2. Acquired hypothyroidism  - TSH 3RD GENERATION; Future  - T4, FREE; Future    3. Routine general medical examination at a health care facility  - CBC W/O DIFF; Future  - METABOLIC PANEL, BASIC; Future    4. Seizures ([address]  Check dilantin level adjust as needed. I have discussed the diagnosis with the patient and the intended plan as seen in the above orders. The patient has received an after-visit summary and questions were answered concerning future plans. I have discussed medication side effects and warnings with the patient as well. I have reviewed the plan of care with the patient, accepted their input and they are in agreement with the treatment goals. Follow-up Disposition:  Return in about 3 months (around 2/5/2019). More than 1/2 of this 40 minute visit was spent in counselling and coordination of care, as described above.     Chitra CRANDALL-BC

## 2018-11-08 NOTE — PATIENT INSTRUCTIONS
Epilepsy: Care Instructions  Your Care Instructions    Epilepsy is a common condition that causes repeated seizures. The seizures are caused by bursts of electrical activity in the brain that aren't normal. Seizures may cause problems with muscle control, movement, speech, vision, or awareness. They can be scary. Epilepsy affects each person differently. Some people have only a few seizures. Others get them more often. If you know what triggers a seizure, you may be able to avoid having one. You can take medicines to control and reduce seizures. You and your doctor will need to find the right combination, schedule, and dose of medicine. This may take time and careful changes. Seizures may get worse and happen more often over time. Follow-up care is a key part of your treatment and safety. Be sure to make and go to all appointments, and call your doctor if you are having problems. It's also a good idea to know your test results and keep a list of the medicines you take. How can you care for yourself at home? · Be safe with medicines. Take your medicines exactly as prescribed. Call your doctor if you think you are having a problem with your medicine. · Make a treatment plan with your doctor. Be sure to follow your plan. · Try to identify and avoid things that may make you more likely to have a seizure. These may include:  ? Not getting enough sleep. ? Using drugs or alcohol. ? Being emotionally stressed. ? Skipping meals. · Keep a record of any seizures you have. Note the date, time of day, and any details about the seizure that you can remember. Your doctor can use this information to plan or adjust your medicine or other treatment. · Be sure that any doctor treating you for another condition knows that you have epilepsy. Each doctor should know what medicines you are taking, if any. · Wear a medical ID bracelet. You can buy this at most [employer].  If you have a seizure that leaves you unconscious or unable to speak for yourself, this bracelet will let those who are treating you know that you have epilepsy. · Talk to your doctor about whether it is safe for you to do certain activities, such as drive or swim. When should you call for help? Call 911 anytime you think you may need emergency care. For example, call if:    · A seizure does not stop as it normally does.     · You have new symptoms such as:  ? Numbness, tingling, or weakness on one side of your body or face. ? Vision changes. ? Trouble speaking or thinking clearly.    Call your doctor now or seek immediate medical care if:    · You have a fever.     · You have a severe headache.    Watch closely for changes in your health, and be sure to contact your doctor if:    · The normal pattern or features of your seizures change. Where can you learn more? Go to http://gui-jason.info/. Henretta Peabody in the search box to learn more about \"Epilepsy: Care Instructions. \"  Current as of: June 4, 2018  Content Version: 11.8  © 9313-6630 [employer]. Care instructions adapted under license by [employer] (which disclaims liability or warranty for this information). If you have questions about a medical condition or this instruction, always ask your healthcare professional. [address] any warranty or liability for your use of this information.

## 2018-11-09 ENCOUNTER — TELEPHONE (OUTPATIENT)
Dept: FAMILY MEDICINE CLINIC | Age: 48
End: 2018-11-09

## 2018-11-09 NOTE — TELEPHONE ENCOUNTER
Patient's care giver came into office today stating that she had called and left a VM 30 min ago but never received a call back. She stated that patient was seen in ED 11/2/18 for UTI and was not treated with medication for this. Spoke with Dr. Deana Virk at [facility] ED. She stated that the NP followed up with group home 11/3/18 regarding urine culture results and spoke with Kimberlyn there. Oneyda Batres had stated to NP that symptoms had resolved and patient was doing better. For this reason no medication was prescribed by ED. I informed Dr. Deana Virk that care giver Yazmin Hart was now in the waiting room concerned that nothing was prescribed for this infection. Dr. Deana Virk state that she was in the middle of a shift and busy and that she was not comfortable talking about patient information over the phone but that once she had some down time, would call the group home and rectify the situation. Provider went to speak with care giver regarding above after call ended with [facility]. Care giver had left.

## 2018-11-19 ENCOUNTER — TELEPHONE (OUTPATIENT)
Dept: FAMILY MEDICINE CLINIC | Age: 48
End: 2018-11-19

## 2018-11-19 NOTE — TELEPHONE ENCOUNTER
Per provider- No to proposed changes. Patients levels were therapeutic on current dosing. Pharmacy advised.

## 2018-11-19 NOTE — TELEPHONE ENCOUNTER
Rena called on behalf of the pt's dilantin medication. The facility called wanting to change the directions on the medication. They want it to be take 1 1/2 tab in the am and then 2 tab in the evening for the whole week verses what he is currently  On which is 1 tab in am and 2 in evening on odd days and then 2 tabs 2 times almanzar on even days.  Please advise

## 2018-12-07 RX ORDER — PHENYTOIN 50 MG/1
TABLET, CHEWABLE ORAL
Qty: 109 TAB | Refills: 0 | Status: SHIPPED | OUTPATIENT
Start: 2018-12-07 | End: 2019-01-08 | Stop reason: SDUPTHER

## 2019-01-08 RX ORDER — PHENYTOIN 50 MG/1
TABLET, CHEWABLE ORAL
Qty: 109 TAB | Refills: 0 | Status: SHIPPED | OUTPATIENT
Start: 2019-01-08 | End: 2020-05-20

## 2019-01-09 RX ORDER — MENTHOL AND ZINC OXIDE .44; 20.625 G/100G; G/100G
OINTMENT TOPICAL
Qty: 113 G | Refills: 0 | Status: SHIPPED | OUTPATIENT
Start: 2019-01-09 | End: 2020-06-24 | Stop reason: SDUPTHER

## 2019-03-04 ENCOUNTER — OFFICE VISIT (OUTPATIENT)
Dept: FAMILY MEDICINE CLINIC | Age: 49
End: 2019-03-04

## 2019-03-04 ENCOUNTER — HOSPITAL ENCOUNTER (OUTPATIENT)
Dept: LAB | Age: 49
Discharge: HOME | End: 2019-03-04
Payer: MEDICAID

## 2019-03-04 VITALS
SYSTOLIC BLOOD PRESSURE: 103 MMHG | WEIGHT: 138 LBS | HEART RATE: 84 BPM | BODY MASS INDEX: 20.92 KG/M2 | HEIGHT: 68 IN | DIASTOLIC BLOOD PRESSURE: 74 MMHG | OXYGEN SATURATION: 99 % | RESPIRATION RATE: 14 BRPM | TEMPERATURE: 95.7 F

## 2019-03-04 DIAGNOSIS — Z02.89 ENCOUNTER FOR COMPLETION OF FORM WITH PATIENT: ICD-10-CM

## 2019-03-04 DIAGNOSIS — D69.6 THROMBOCYTOPENIA (HCC): Primary | ICD-10-CM

## 2019-03-04 DIAGNOSIS — R56.9 SEIZURES (HCC): ICD-10-CM

## 2019-03-04 DIAGNOSIS — D69.6 THROMBOCYTOPENIA (HCC): ICD-10-CM

## 2019-03-04 PROBLEM — M41.9 SCOLIOSIS DEFORMITY OF SPINE: Status: ACTIVE | Noted: 2019-02-14

## 2019-03-04 LAB
ERYTHROCYTE [DISTWIDTH] IN BLOOD BY AUTOMATED COUNT: 13.1 % (ref 11.6–14.5)
HCT VFR BLD AUTO: 44 % (ref 36–48)
HGB BLD-MCNC: 15.2 G/DL (ref 13–16)
MCH RBC QN AUTO: 32.1 PG (ref 24–34)
MCHC RBC AUTO-ENTMCNC: 34.5 G/DL (ref 31–37)
MCV RBC AUTO: 92.8 FL (ref 74–97)
PLATELET # BLD AUTO: 160 K/UL (ref 135–420)
PMV BLD AUTO: 12.2 FL (ref 9.2–11.8)
RBC # BLD AUTO: 4.74 M/UL (ref 4.7–5.5)
WBC # BLD AUTO: 4.8 K/UL (ref 4.6–13.2)

## 2019-03-04 PROCEDURE — 36415 COLL VENOUS BLD VENIPUNCTURE: CPT

## 2019-03-04 PROCEDURE — 85027 COMPLETE CBC AUTOMATED: CPT

## 2019-03-04 RX ORDER — ZONISAMIDE 100 MG/1
600 CAPSULE ORAL DAILY
COMMUNITY
End: 2021-01-05

## 2019-03-04 NOTE — PROGRESS NOTES
Primitivo Guillen is a 50 y.o. male (: 1970) presenting to address:    Chief Complaint   Patient presents with    Physical       Vitals:    19 1336   BP: 103/74   Pulse: 84   Resp: 14   Temp: 95.7 °F (35.4 °C)   TempSrc: Axillary   SpO2: 99%   Weight: 138 lb (62.6 kg)   Height: 5' 8\" (1.727 m)   PainSc:   0 - No pain       Hearing/Vision:   No exam data present    Learning Assessment:   No flowsheet data found. Depression Screening:     3 most recent PHQ Screens 2018   PHQ Not Done -   Little interest or pleasure in doing things Not at all   Feeling down, depressed, irritable, or hopeless Not at all   Total Score PHQ 2 0     Fall Risk Assessment:   No flowsheet data found. Abuse Screening:   No flowsheet data found. Coordination of Care Questionaire:   1. Have you been to the ER, urgent care clinic since your last visit? Hospitalized since your last visit? NO    2. Have you seen or consulted any other health care providers outside of the [address] [place] [address] since your last visit? Include any pap smears or colon screening. NO    Advanced Directive:   1. Do you have an Advanced Directive? NO    2. Would you like information on Advanced Directives?  NO

## 2019-03-04 NOTE — PATIENT INSTRUCTIONS
Thrombocytopenia: Care Instructions  Your Care Instructions    Thrombocytopenia is a low number of platelets in the blood. Platelets are the cells that help blood clot. If you don't have enough of them, your blood cannot clot well. So it is harder to stop bleeding. You may have low platelets because your bone marrow does not make them. Or your body's defenses (immune system) may destroy them. Having an enlarged spleen can also reduce the number of platelets in your blood. This is because they can get trapped in the enlarged spleen. Some diseases or medicines may also cause low platelets. But platelets may go back to normal levels if the disease is treated or the medicine is stopped. You may not need treatment if your problem is mild. If you do need treatment, you may have platelets added to your blood. Or you may get medicine to stop the loss of platelets or help your body make them. Follow-up care is a key part of your treatment and safety. Be sure to make and go to all appointments, and call your doctor if you are having problems. It's also a good idea to know your test results and keep a list of the medicines you take. How can you care for yourself at home? · Be safe with medicines. Take your medicines exactly as prescribed. Call your doctor if you think you are having a problem with your medicine. · Do not take aspirin or anti-inflammatory medicines unless your doctor says it is okay. Examples are ibuprofen (Advil, Motrin) and naproxen (Aleve). They may increase the risk of bleeding. · Avoid contact sports or activities that could cause you to fall. When should you call for help? Call 911 anytime you think you may need emergency care. For example, call if:    · You passed out (lost consciousness).     · You have signs of severe bleeding, which includes:  ? You have a severe headache that is different from past headaches. ? You vomit blood or what looks like coffee grounds.   ? Your stools are maroon or very bloody.    Call your doctor now or seek immediate medical care if:    · You are dizzy or lightheaded, or you feel like you may faint.     · You have abnormal bleeding, such as:  ? A nosebleed that you can't easily stop. ? Your stools are black and look like tar, or they have streaks of blood. ? You have blood in your urine. ? You have joint pain. ? You have bruises or blood spots under your skin.    Watch closely for changes in your health, and be sure to contact your doctor if:    · You do not get better as expected. Where can you learn more? Go to http://gui-jason.info/. Enter C220 in the search box to learn more about \"Thrombocytopenia: Care Instructions. \"  Current as of: May 6, 2018  Content Version: 11.9  © 8931-9754 [employer]. Care instructions adapted under license by [employer] (which disclaims liability or warranty for this information). If you have questions about a medical condition or this instruction, always ask your healthcare professional. [address] any warranty or liability for your use of this information.

## 2019-03-04 NOTE — PROGRESS NOTES
Manish Mora is a 50 y.o.  male and presents with    Chief Complaint   Patient presents with    Abnormal Lab Results     Subjective:  Patient presents to have repeat labs done for thrombocytopenia and for form completion. Patient is due for annual paperwork and quarterly paperwork. No new c/o today. Only med change saw Neuro and will trial off seizure medication over 1 month process. Caregiver here with patient. Current Outpatient Medications   Medication Sig Dispense Refill    zonisamide (ZONEGRAN) 100 mg capsule Take 100 mg by mouth daily. Open 4 capsules, dissolve contents in water and administer via g-tube daily      menthol-zinc oxide (CALMOSEPTINE) 0.44-20.6 % oint APPLY TOPICALLY TO G-TUBE SITE DAILY TO PREVENT OVERGROWTH AND IRRITATION AT SITE 113 g 0    phenytoin (DILANTIN) 50 mg chewable tablet TAKE 1 TAB IN THE MORNING @0600 AND 2 TABS IN THE EVENING @1900 ON ODD DAYS; AND TAKE 2 TABS 2 TIMES DAILY @ 0600 AND 1900 ON EVEN DAYS;ARIAS 759 Tab 0    folic acid-vit X0-QTL E34 (FOLBIC) 2.5-25-2 mg tablet TAKE 1 TAB VIA G-TUBE 2 TIMES DAILY FOR FOLATE DEFICIENCY - CRUSH ANDDISSOLVE IN H2O (2/2.5/25) 62 Tab 0    calcitonin, salmon, (MIACALCIN) nasal USE 1 SPRAY IN ONE NOSTRIL DAILY FOR OSTEOPOROSIS - ALTERNATE NOSTRILS DAILY - **REFRIGERATE UNTIL OPENED;THEN STORE AT ROOM TEMP.;DISCARD 3 3.7 mL 3    baclofen (LIORESAL) 20 mg tablet TAKE 1 TAB VIA G-TUBE 3 TIMES DAILY FOR SPASTICITY - CRUSH AND DISSOLVE IN H2O 1 Tab 0    ibuprofen (MOTRIN) 600 mg tablet 1 Tab by Per G Tube route every eight (8) hours as needed for Pain. 90 Tab 0    diazePAM (VALIUM) 10 mg tablet Take 1 hour before eye exam  Crushed and administered through G-tube 1 Tab 3    diclofenac (VOLTAREN) 1 % gel Apply  to affected area four (4) times daily. 500 g 0    nystatin (MYCOSTATIN) powder Apply to affected area twice daily with diaper change 60 g 3    Retapamulin (ALTABAX) 1 % ointment Apply  to affected area two (2) times a day. 30 g 0    mupirocin calcium (BACTROBAN) 2 % topical cream Apply  to affected area two (2) times a day. 15 g 0    levETIRAcetam (KEPPRA) 100 mg/mL solution 5 ml twice a day 1000 mL 3    levothyroxine (SYNTHROID) 25 mcg tablet Take 1 tab via g-tube once daily   Crush and dissolve in h20 30 Tab 0    calcium-vitamin D (OYSTER SHELL) 500 mg(1,250mg) -200 unit per tablet Take 1 Tab by mouth two (2) times daily (with meals). 60 Tab 0    sorbitol 70 % solution Take 15 ml via g-tube in the morning to maintain bowel regularity 474 mL 0    loratadine (CLARITIN) 10 mg tablet Take 1 Tab by mouth daily as needed for Allergies. 90 Tab 3    OTHER ACO as needed 1 Each 0    OTHER Stander for spine alignment  Strap for toe straightening 1 Each 0    MULTIVITAMIN,THERAPEUTIC (THERAVITE PO) Take  by mouth.  lacosamide (VIMPAT) 200 mg tab tablet Take 200 mg by mouth two (2) times a day.  LORazepam (INTENSOL) 2 mg/mL concentrated solution Take 1 mg by mouth every eight (8) hours as needed for Anxiety.  EUCALYP/ME-SALICYLATE/MEN/THYM (LISTERINE MM) by Mucous Membrane route.  BABY OIL, MINERAL OIL, EX by Apply Externally route.  diazePAM (VALIUM) 10 mg tablet Take 1 hour before dental exam  Crushed and administered through G-tube 1 Tab 3     Allergies   Allergen Reactions    Avelox [Moxifloxacin] Seizures    Lamictal [Lamotrigine] Rash    Lidocaine Other (comments)    Lyrica [Pregabalin] Other (comments)    Valproic Acid Other (comments)       Review of Systems   Unable to perform ROS: Patient nonverbal         Objective:  Vitals:    03/04/19 1336   BP: 103/74   Pulse: 84   Resp: 14   Temp: 95.7 °F (35.4 °C)   TempSrc: Axillary   SpO2: 99%   Weight: 138 lb (62.6 kg)   Height: 5' 8\" (1.727 m)   PainSc:   0 - No pain     General:   Well-groomed, well-nourished, in no distress, pleasant, alert, appropriate    Cardiovasc:   No JVD. RRR,  no murmur, rubs or gallops.    Pulmonary:    Lungs clear bilaterally, no wheezing, rales or rhonchi. Assessment/Plan:      1. Seizures ([address]  Continue plan from Neuro to trial off dilantin. 2. Thrombocytopenia ([address]  Repeat labs   - CBC W/O DIFF; Future    3. Encounter for completion of form with patient  All forms completed with yearly physical form no changes     I have discussed the diagnosis with the patient and the intended plan as seen in the above orders. The patient has received an after-visit summary and questions were answered concerning future plans. I have discussed medication side effects and warnings with the patient as well. I have reviewed the plan of care with the patient, accepted their input and they are in agreement with the treatment goals. Follow-up Disposition:  Return in about 6 months (around 9/4/2019). More than 1/2 of this 40 minute visit was spent in counselling and coordination of care, as described above.     Mariela CRANDALL-BC

## 2019-03-05 ENCOUNTER — TELEPHONE (OUTPATIENT)
Dept: FAMILY MEDICINE CLINIC | Age: 49
End: 2019-03-05

## 2019-03-05 NOTE — TELEPHONE ENCOUNTER
Spoke with nurse at living facility that labs look good and to repeat in one year; nurse agreed with this treatment plan.

## 2019-04-03 RX ORDER — CALCITONIN SALMON 200 [IU]/.09ML
SPRAY, METERED NASAL
Qty: 3.7 ML | Refills: 0 | Status: SHIPPED | OUTPATIENT
Start: 2019-04-03 | End: 2019-04-29 | Stop reason: SDUPTHER

## 2019-04-04 RX ORDER — CALCITONIN SALMON 200 [IU]/.09ML
SPRAY, METERED NASAL
Qty: 3.7 ML | Refills: 0 | Status: CANCELLED | OUTPATIENT
Start: 2019-04-04

## 2019-07-02 ENCOUNTER — OFFICE VISIT (OUTPATIENT)
Dept: FAMILY MEDICINE CLINIC | Age: 49
End: 2019-07-02

## 2019-07-02 VITALS
DIASTOLIC BLOOD PRESSURE: 77 MMHG | WEIGHT: 110 LBS | RESPIRATION RATE: 24 BRPM | OXYGEN SATURATION: 99 % | SYSTOLIC BLOOD PRESSURE: 112 MMHG | BODY MASS INDEX: 16.73 KG/M2 | TEMPERATURE: 95.5 F | HEART RATE: 90 BPM

## 2019-07-02 DIAGNOSIS — Z91.09 ENVIRONMENTAL ALLERGIES: ICD-10-CM

## 2019-07-02 DIAGNOSIS — J30.1 SEASONAL ALLERGIC RHINITIS DUE TO POLLEN: Primary | ICD-10-CM

## 2019-07-02 NOTE — PROGRESS NOTES
Porter Hill is a 50 y.o. male (: 1970) presenting to address:    Chief Complaint   Patient presents with    Nasal Congestion    Form Completion     DNR       Vitals:    19 1123   BP: 112/77   Pulse: 90   Resp: 24   Temp: 95.5 °F (35.3 °C)   TempSrc: Axillary   SpO2: 99%   Weight: 110 lb (49.9 kg)   PainSc:   0 - No pain       Hearing/Vision:   No exam data present    Learning Assessment:   No flowsheet data found. Depression Screening:     3 most recent PHQ Screens 2019   PHQ Not Done -   Little interest or pleasure in doing things Not at all   Feeling down, depressed, irritable, or hopeless Not at all   Total Score PHQ 2 0     Fall Risk Assessment:   No flowsheet data found. Abuse Screening:   No flowsheet data found. Coordination of Care Questionaire:   1. Have you been to the ER, urgent care clinic since your last visit? Hospitalized since your last visit? NO    2. Have you seen or consulted any other health care providers outside of the [address] [place] [address] since your last visit? Include any pap smears or colon screening. YES neurology    Advanced Directive:   1. Do you have an Advanced Directive? NO    2. Would you like information on Advanced Directives?  NO

## 2019-07-07 NOTE — PATIENT INSTRUCTIONS
Allergies: Care Instructions  Your Care Instructions    Allergies occur when your body's defense system (immune system) overreacts to certain substances. The immune system treats a harmless substance as if it were a harmful germ or virus. Many things can cause this overreaction, including pollens, medicine, food, dust, animal dander, and mold. Allergies can be mild or severe. Mild allergies can be managed with home treatment. But medicine may be needed to prevent problems. Managing your allergies is an important part of staying healthy. Your doctor may suggest that you have allergy testing to help find out what is causing your allergies. When you know what things trigger your symptoms, you can avoid them. This can prevent allergy symptoms and other health problems. For severe allergies that cause reactions that affect your whole body (anaphylactic reactions), your doctor may prescribe a shot of epinephrine to carry with you in case you have a severe reaction. Learn how to give yourself the shot and keep it with you at all times. Make sure it is not . Follow-up care is a key part of your treatment and safety. Be sure to make and go to all appointments, and call your doctor if you are having problems. It's also a good idea to know your test results and keep a list of the medicines you take. How can you care for yourself at home? · If you have been told by your doctor that dust or dust mites are causing your allergy, decrease the dust around your bed:  ? Wash sheets, pillowcases, and other bedding in hot water every week. ? Use dust-proof covers for pillows, duvets, and mattresses. Avoid plastic covers because they tear easily and do not \"breathe. \" Wash as instructed on the label. ? Do not use any blankets and pillows that you do not need. ? Use blankets that you can wash in your washing machine. ? Consider removing drapes and carpets, which attract and hold dust, from your bedroom.   · If you are allergic to house dust and mites, do not use home humidifiers. Your doctor can suggest ways you can control dust and mites. · Look for signs of cockroaches. Cockroaches cause allergic reactions. Use cockroach baits to get rid of them. Then, clean your home well. Cockroaches like areas where grocery bags, newspapers, empty bottles, or cardboard boxes are stored. Do not keep these inside your home, and keep trash and food containers sealed. Seal off any spots where cockroaches might enter your home. · If you are allergic to mold, get rid of furniture, rugs, and drapes that smell musty. Check for mold in the bathroom. · If you are allergic to outdoor pollen or mold spores, use air-conditioning. Change or clean all filters every month. Keep windows closed. · If you are allergic to pollen, stay inside when pollen counts are high. Use a vacuum  with a HEPA filter or a double-thickness filter at least two times each week. · Stay inside when air pollution is bad. Avoid paint fumes, perfumes, and other strong odors. · Avoid conditions that make your allergies worse. Stay away from smoke. Do not smoke or let anyone else smoke in your house. Do not use fireplaces or wood-burning stoves. · If you are allergic to your pets, change the air filter in your furnace every month. Use high-efficiency filters. · If you are allergic to pet dander, keep pets outside or out of your bedroom. Old carpet and cloth furniture can hold a lot of animal dander. You may need to replace them. When should you call for help? Give an epinephrine shot if:    · You think you are having a severe allergic reaction.     · You have symptoms in more than one body area, such as mild nausea and an itchy mouth.    After giving an epinephrine shot call 911, even if you feel better.   Call 911 if:    · You have symptoms of a severe allergic reaction. These may include:  ? Sudden raised, red areas (hives) all over your body. ?  Swelling of the throat, mouth, lips, or tongue. ? Trouble breathing. ? Passing out (losing consciousness). Or you may feel very lightheaded or suddenly feel weak, confused, or restless.     · You have been given an epinephrine shot, even if you feel better.    Call your doctor now or seek immediate medical care if:    · You have symptoms of an allergic reaction, such as:  ? A rash or hives (raised, red areas on the skin). ? Itching. ? Swelling. ? Belly pain, nausea, or vomiting.    Watch closely for changes in your health, and be sure to contact your doctor if:    · You do not get better as expected. Where can you learn more? Go to http://gui-jason.info/. Enter V009 in the search box to learn more about \"Allergies: Care Instructions. \"  Current as of: June 27, 2018  Content Version: 11.9  © 9938-2139 [employer], [employer]. Care instructions adapted under license by [employer] (which disclaims liability or warranty for this information). If you have questions about a medical condition or this instruction, always ask your healthcare professional. [address] any warranty or liability for your use of this information.

## 2019-07-07 NOTE — PROGRESS NOTES
Subjective:      Patient : This patient is a 50 y.o. male that comes in with a chief complaint of allerigic rhinitis and it has worsened. The patient has not taken anything for it. Caregiver present in room states increased congestion in the past several days would like additional order for Mucinex and antihistamine OTC. Objective:     ROS:    Feeling well. No dyspnea or chest pain on exertion. No abdominal pain, change in bowel habits, black or bloody stools. No urinary tract or prostatic symptoms. No neurological complaints. OBJECTIVE:   The patient appears well, alert, oriented x 3, in no distress. Visit Vitals  /77 (BP 1 Location: Right arm, BP Patient Position: Sitting)   Pulse 90   Temp 95.5 °F (35.3 °C) (Axillary)   Resp 24   Wt 110 lb (49.9 kg) Comment: weight done at [facility]   SpO2 99%   BMI 16.73 kg/m²     ENT normal.  Neck supple. No adenopathy or thyromegaly. JODEE. + PND  Lungs are clear, good air entry, no wheezes, rhonchi or rales. Cardiovascular:S1 and S2 normal, no murmurs, regular rate and rhythm. Abdomen is soft without tenderness, guarding, mass or organomegaly. Extremities show no edema, normal peripheral pulses. Neurological is normal without focal findings. Assessment/Plan:     Encounter Diagnoses   Name Primary?  Seasonal allergic rhinitis due to pollen Yes    Environmental allergies      Patient may use Mucinex OTC and antihistamine as needed caregiver aware paperwork signed for [facility]. Return to care as needed if not improved.

## 2019-08-13 ENCOUNTER — TELEPHONE (OUTPATIENT)
Dept: FAMILY MEDICINE CLINIC | Age: 49
End: 2019-08-13

## 2019-08-13 DIAGNOSIS — E63.9 NUTRITION DISORDER: ICD-10-CM

## 2019-08-13 DIAGNOSIS — E03.9 ACQUIRED HYPOTHYROIDISM: Primary | ICD-10-CM

## 2019-08-13 DIAGNOSIS — R53.83 OTHER FATIGUE: ICD-10-CM

## 2019-08-13 NOTE — TELEPHONE ENCOUNTER
Received fax from dietezekiel crandall/ Matt peters [facility] with recommendations for lab orders for :    CMP, CBC, TSH, Vit D 250H    Fax orders to [phone]

## 2019-08-21 ENCOUNTER — TELEPHONE (OUTPATIENT)
Dept: FAMILY MEDICINE CLINIC | Age: 49
End: 2019-08-21

## 2019-08-21 NOTE — TELEPHONE ENCOUNTER
[address] [facility] order to [phone]  Confirmation received   I put paper work to scanning.

## 2019-09-17 ENCOUNTER — CLINICAL SUPPORT (OUTPATIENT)
Dept: FAMILY MEDICINE CLINIC | Age: 49
End: 2019-09-17

## 2019-09-17 DIAGNOSIS — Z23 ENCOUNTER FOR IMMUNIZATION: ICD-10-CM

## 2019-09-17 NOTE — PROGRESS NOTES
Flu Immunization/s administered 9/17/2019 by Gomez Blizzard with guardian's consent. Patient tolerated procedure well. No reactions noted.

## 2019-10-09 DIAGNOSIS — E03.9 ACQUIRED HYPOTHYROIDISM: ICD-10-CM

## 2019-10-09 DIAGNOSIS — R53.83 OTHER FATIGUE: ICD-10-CM

## 2019-10-09 DIAGNOSIS — E63.9 NUTRITION DISORDER: ICD-10-CM

## 2019-10-16 ENCOUNTER — OFFICE VISIT (OUTPATIENT)
Dept: FAMILY MEDICINE CLINIC | Age: 49
End: 2019-10-16

## 2019-10-16 NOTE — PROGRESS NOTES
Caregiver unaware that physical is due will after 11/5/2019. Labs are all current as well. Appointment not needed no other concerns from patient or caregiver. A user error has taken place: encounter opened in error, closed for administrative reasons.

## 2019-11-20 ENCOUNTER — OFFICE VISIT (OUTPATIENT)
Dept: FAMILY MEDICINE CLINIC | Age: 49
End: 2019-11-20

## 2019-11-20 VITALS
RESPIRATION RATE: 18 BRPM | BODY MASS INDEX: 16.67 KG/M2 | HEIGHT: 68 IN | WEIGHT: 110 LBS | OXYGEN SATURATION: 96 % | SYSTOLIC BLOOD PRESSURE: 101 MMHG | HEART RATE: 80 BPM | TEMPERATURE: 96.8 F | DIASTOLIC BLOOD PRESSURE: 66 MMHG

## 2019-11-20 DIAGNOSIS — R56.9 SEIZURES (HCC): Primary | ICD-10-CM

## 2019-11-20 DIAGNOSIS — M41.9 SCOLIOSIS, UNSPECIFIED SCOLIOSIS TYPE, UNSPECIFIED SPINAL REGION: ICD-10-CM

## 2019-11-20 DIAGNOSIS — Z00.00 ROUTINE GENERAL MEDICAL EXAMINATION AT A HEALTH CARE FACILITY: ICD-10-CM

## 2019-11-20 DIAGNOSIS — D69.6 THROMBOCYTOPENIA (HCC): ICD-10-CM

## 2019-11-20 DIAGNOSIS — G80.8 OTHER CEREBRAL PALSY (HCC): ICD-10-CM

## 2019-11-20 NOTE — PATIENT INSTRUCTIONS
Well Visit, Ages 25 to 48: Care Instructions Your Care Instructions Physical exams can help you stay healthy. Your doctor has checked your overall health and may have suggested ways to take good care of yourself. He or she also may have recommended tests. At home, you can help prevent illness with healthy eating, regular exercise, and other steps. Follow-up care is a key part of your treatment and safety. Be sure to make and go to all appointments, and call your doctor if you are having problems. It's also a good idea to know your test results and keep a list of the medicines you take. How can you care for yourself at home? · Reach and stay at a healthy weight. This will lower your risk for many problems, such as obesity, diabetes, heart disease, and high blood pressure. · Get at least 30 minutes of physical activity on most days of the week. Walking is a good choice. You also may want to do other activities, such as running, swimming, cycling, or playing tennis or team sports. Discuss any changes in your exercise program with your doctor. · Do not smoke or allow others to smoke around you. If you need help quitting, talk to your doctor about stop-smoking programs and medicines. These can increase your chances of quitting for good. · Talk to your doctor about whether you have any risk factors for sexually transmitted infections (STIs). Having one sex partner (who does not have STIs and does not have sex with anyone else) is a good way to avoid these infections. · Use birth control if you do not want to have children at this time. Talk with your doctor about the choices available and what might be best for you. · Protect your skin from too much sun. When you're outdoors from 10 a.m. to 4 p.m., stay in the shade or cover up with clothing and a hat with a wide brim. Wear sunglasses that block UV rays. Even when it's cloudy, put broad-spectrum sunscreen (SPF 30 or higher) on any exposed skin. · See a dentist one or two times a year for checkups and to have your teeth cleaned. · Wear a seat belt in the car. Follow your doctor's advice about when to have certain tests. These tests can spot problems early. For everyone · Cholesterol. Have the fat (cholesterol) in your blood tested after age 21. Your doctor will tell you how often to have this done based on your age, family history, or other things that can increase your risk for heart disease. · Blood pressure. Have your blood pressure checked during a routine doctor visit. Your doctor will tell you how often to check your blood pressure based on your age, your blood pressure results, and other factors. · Vision. Talk with your doctor about how often to have a glaucoma test. 
· Diabetes. Ask your doctor whether you should have tests for diabetes. · Colon cancer. Your risk for colorectal cancer gets higher as you get older. Some experts say that adults should start regular screening at age 48 and stop at age 76. Others say to start before age 48 or continue after age 76. Talk with your doctor about your risk and when to start and stop screening. For women · Breast exam and mammogram. Talk to your doctor about when you should have a clinical breast exam and a mammogram. Medical experts differ on whether and how often women under 50 should have these tests. Your doctor can help you decide what is right for you. · Cervical cancer screening test and pelvic exam. Begin with a Pap test at age 24. The test often is part of a pelvic exam. Starting at age 27, you may choose to have a Pap test, an HPV test, or both tests at the same time (called co-testing). Talk with your doctor about how often to have testing. · Tests for sexually transmitted infections (STIs). Ask whether you should have tests for STIs. You may be at risk if you have sex with more than one person, especially if your partners do not wear condoms. For men · Tests for sexually transmitted infections (STIs). Ask whether you should have tests for STIs. You may be at risk if you have sex with more than one person, especially if you do not wear a condom. · Testicular cancer exam. Ask your doctor whether you should check your testicles regularly. · Prostate exam. Talk to your doctor about whether you should have a blood test (called a PSA test) for prostate cancer. Experts differ on whether and when men should have this test. Some experts suggest it if you are older than 39 and are -American or have a father or brother who got prostate cancer when he was younger than 72. When should you call for help? Watch closely for changes in your health, and be sure to contact your doctor if you have any problems or symptoms that concern you. Where can you learn more? Go to http://gui-jason.info/. Enter P072 in the search box to learn more about \"Well Visit, Ages 25 to 48: Care Instructions. \" Current as of: December 13, 2018 Content Version: 12.2 © 4656-7179 [employer]. Care instructions adapted under license by [employer] (which disclaims liability or warranty for this information). If you have questions about a medical condition or this instruction, always ask your healthcare professional. [address] any warranty or liability for your use of this information.

## 2019-11-20 NOTE — PROGRESS NOTES
Mela Garcia is a 52 y.o. male (: 1970) presenting to address:    Chief Complaint   Patient presents with    Physical       Vitals:    19 0818   BP: 101/66   Pulse: 80   Resp: 18   Temp: 96.8 °F (36 °C)   TempSrc: Oral   SpO2: 96%   Weight: 110 lb (49.9 kg)   Height: 5' 8\" (1.727 m)   PainSc:   0 - No pain       Hearing/Vision:   No exam data present    Learning Assessment:   No flowsheet data found. Depression Screening:     3 most recent PHQ Screens 2019   PHQ Not Done -   Little interest or pleasure in doing things Not at all   Feeling down, depressed, irritable, or hopeless Not at all   Total Score PHQ 2 0     Fall Risk Assessment:   No flowsheet data found. Abuse Screening:   No flowsheet data found. Coordination of Care Questionaire:   1. Have you been to the ER, urgent care clinic since your last visit? Hospitalized since your last visit? NO    2. Have you seen or consulted any other health care providers outside of the [address] [place] [address] since your last visit? Include any pap smears or colon screening. NO neurology    Advanced Directive:   1. Do you have an Advanced Directive? NO    2. Would you like information on Advanced Directives?  NO

## 2019-12-10 NOTE — PROGRESS NOTES
Subjective:   Bandar Rahman is a 52 y.o. male presenting for his annual checkup. Is in a group home requires a physical once yearly and paperwork signed. ROS: Patient nonverbal unable to express needs    Specific concerns today: Caregiver does not express any concerns today states patient is stable with no changes in condition. .    Current Outpatient Medications   Medication Sig Dispense Refill    OTHER       calcitonin, salmon, (MIACALCIN) nasal USE 1 SPRAY IN ONE NOSTRIL DAILY FOR OSTEOPOROSIS - ALTERNATE NOSTRILS DAILY - **REFRIGERATE UNTIL OPENED;THEN STORE AT ROOM TEMP.;DISCARD 3 3.7 mL 11    zonisamide (ZONEGRAN) 100 mg capsule Take 100 mg by mouth daily. Open 5 capsules, dissolve contents in water and administer via g-tube daily      menthol-zinc oxide (CALMOSEPTINE) 0.44-20.6 % oint APPLY TOPICALLY TO G-TUBE SITE DAILY TO PREVENT OVERGROWTH AND IRRITATION AT SITE 195 g 0    folic acid-vit Q8-GYX T04 (FOLBIC) 2.5-25-2 mg tablet TAKE 1 TAB VIA G-TUBE 2 TIMES DAILY FOR FOLATE DEFICIENCY - CRUSH ANDDISSOLVE IN H2O (2/2.5/25) 62 Tab 0    baclofen (LIORESAL) 20 mg tablet TAKE 1 TAB VIA G-TUBE 3 TIMES DAILY FOR SPASTICITY - CRUSH AND DISSOLVE IN H2O 1 Tab 0    ibuprofen (MOTRIN) 600 mg tablet 1 Tab by Per G Tube route every eight (8) hours as needed for Pain. 90 Tab 0    diazePAM (VALIUM) 10 mg tablet Take 1 hour before dental exam  Crushed and administered through G-tube 1 Tab 3    diazePAM (VALIUM) 10 mg tablet Take 1 hour before eye exam  Crushed and administered through G-tube 1 Tab 3    diclofenac (VOLTAREN) 1 % gel Apply  to affected area four (4) times daily. 500 g 0    nystatin (MYCOSTATIN) powder Apply to affected area twice daily with diaper change 60 g 3    Retapamulin (ALTABAX) 1 % ointment Apply  to affected area two (2) times a day. 30 g 0    mupirocin calcium (BACTROBAN) 2 % topical cream Apply  to affected area two (2) times a day.  15 g 0    levothyroxine (SYNTHROID) 25 mcg tablet Take 1 tab via g-tube once daily   Crush and dissolve in h20 30 Tab 0    calcium-vitamin D (OYSTER SHELL) 500 mg(1,250mg) -200 unit per tablet Take 1 Tab by mouth two (2) times daily (with meals). 60 Tab 0    sorbitol 70 % solution Take 15 ml via g-tube in the morning to maintain bowel regularity 474 mL 0    loratadine (CLARITIN) 10 mg tablet Take 1 Tab by mouth daily as needed for Allergies. 90 Tab 3    OTHER ACO as needed 1 Each 0    OTHER Stander for spine alignment  Strap for toe straightening 1 Each 0    MULTIVITAMIN,THERAPEUTIC (THERAVITE PO) Take  by mouth.  lacosamide (VIMPAT) 200 mg tab tablet Take 200 mg by mouth two (2) times a day.  LORazepam (INTENSOL) 2 mg/mL concentrated solution Take 1 mg by mouth every eight (8) hours as needed for Anxiety.  EUCALYP/ME-SALICYLATE/MEN/THYM (LISTERINE MM) by Mucous Membrane route.  BABY OIL, MINERAL OIL, EX by Apply Externally route.  phenytoin (DILANTIN) 50 mg chewable tablet TAKE 1 TAB IN THE MORNING @0600 AND 2 TABS IN THE EVENING @1900 ON ODD DAYS; AND TAKE 2 TABS 2 TIMES DAILY @ 0600 AND 1900 ON EVEN DAYS;ARIAS 109 Tab 0    levETIRAcetam (KEPPRA) 100 mg/mL solution 5 ml twice a day 1000 mL 3     Allergies   Allergen Reactions    Avelox [Moxifloxacin] Seizures    Lamictal [Lamotrigine] Rash    Lidocaine Other (comments)    Lyrica [Pregabalin] Other (comments)    Valproic Acid Other (comments)        Lab Results   Component Value Date/Time    WBC 4.8 03/04/2019 02:24 PM    HGB 15.2 03/04/2019 02:24 PM    HCT 44.0 03/04/2019 02:24 PM    PLATELET 315 87/62/5605 02:24 PM    MCV 92.8 03/04/2019 02:24 PM        Objective:     Visit Vitals  /66 (BP 1 Location: Right arm, BP Patient Position: Sitting)   Pulse 80   Temp 96.8 °F (36 °C) (Oral)   Resp 18   Ht 5' 8\" (1.727 m)   Wt 110 lb (49.9 kg) Comment: patient weighed last wed @ [address]    ( group home )   SpO2 96%   BMI 16.73 kg/m²     The patient appears well.    ENT normal.  Neck supple. No adenopathy or thyromegaly. JODEE. Lungs are clear, good air entry, no wheezes, rhonchi or rales. S1 and S2 normal, no murmurs, regular rate and rhythm. Abdomen is soft without tenderness, guarding, mass or organomegaly.  exam: no penile lesions or discharge, no testicular masses or tenderness, no hernias. Extremities show no edema, normal peripheral pulses. Neurological is abnormal    Assessment/Plan:   healthy adult male  Patient with multiple comorbidities all conditions stable at this time medication management in place also stable no concerns noted by caregiver patient is nonverbal for now continue the same return to care in 6 months for regular follow-up and yearly for physical  continue present diet with no restrictions, continue present plan, call if any problems. Encounter Diagnoses   Name Primary?  Seizures ([address] Yes    Thrombocytopenia ([address]     Routine general medical examination at a health care facility     Scoliosis, unspecified scoliosis type, unspecified spinal region     Other cerebral palsy ([address]      Orders Placed This Encounter    OTHER   .

## 2020-02-20 DIAGNOSIS — G80.8 OTHER CEREBRAL PALSY (HCC): ICD-10-CM

## 2020-02-20 RX ORDER — DIAZEPAM 10 MG/1
TABLET ORAL
Qty: 1 TAB | Refills: 3 | OUTPATIENT
Start: 2020-02-20

## 2020-02-20 NOTE — TELEPHONE ENCOUNTER
Last seen 11/20/19    Last filled 6/26/18 qty 1 w/ 3 refills (for dental procedures)    Future Appointments   Date Time Provider Emigdio Henry   4/3/2020 10:15 AM Mickey Lara MD [facility]

## 2020-02-21 RX ORDER — DIAZEPAM 10 MG/1
TABLET ORAL
Qty: 1 TAB | Refills: 3 | Status: SHIPPED | OUTPATIENT
Start: 2020-02-21 | End: 2021-05-06 | Stop reason: SDUPTHER

## 2020-02-21 NOTE — TELEPHONE ENCOUNTER
I have reviewed this patient's report generated by the [place] which does not demonstrate aberrancies and inconsistencies with regard to the historical prescribing of controlled medications to this patient by other providers.

## 2020-02-21 NOTE — TELEPHONE ENCOUNTER
Orders Placed This Encounter    diazePAM (VALIUM) 10 mg tablet     Sig: Take 1 hour before eye exam  Crushed and administered through G-tube     Dispense:  1 Tab     Refill:  3     PRINTED

## 2020-04-03 ENCOUNTER — TELEPHONE (OUTPATIENT)
Dept: FAMILY MEDICINE CLINIC | Age: 50
End: 2020-04-03

## 2020-04-03 NOTE — TELEPHONE ENCOUNTER
Please make copy of signed physicians orders for group home, and submit for scanning. Inform group home that physicians orders are ready for pickup. Thanks.

## 2020-05-19 ENCOUNTER — VIRTUAL VISIT (OUTPATIENT)
Dept: FAMILY MEDICINE CLINIC | Age: 50
End: 2020-05-19

## 2020-05-19 DIAGNOSIS — M41.9 SCOLIOSIS, UNSPECIFIED SCOLIOSIS TYPE, UNSPECIFIED SPINAL REGION: ICD-10-CM

## 2020-05-19 DIAGNOSIS — R56.9 SEIZURES (HCC): ICD-10-CM

## 2020-05-19 DIAGNOSIS — D69.6 THROMBOCYTOPENIA (HCC): ICD-10-CM

## 2020-05-19 DIAGNOSIS — G80.8 OTHER CEREBRAL PALSY (HCC): Primary | ICD-10-CM

## 2020-05-19 DIAGNOSIS — M85.80 OSTEOPENIA, UNSPECIFIED LOCATION: ICD-10-CM

## 2020-05-19 DIAGNOSIS — E03.9 ACQUIRED HYPOTHYROIDISM: ICD-10-CM

## 2020-05-19 DIAGNOSIS — Z74.3 REQUIRES CONTINUOUS SUPERVISION FOR ACTIVITIES OF DAILY LIVING (ADL): ICD-10-CM

## 2020-05-19 NOTE — PROGRESS NOTES
[facility]  Primary Care Office Visit - Telemedicine Problem-Oriented Note    Consent: Rachelle Valiente, who was seen by synchronous (real-time) audio only technology, and/or his healthcare decision maker, is aware that this patient-initiated, Telehealth encounter on 5/19/2020 is a billable service, with coverage as determined by his insurance carrier. He is aware that he may receive a bill and has provided verbal consent to proceed: Yes. This service was provided through telehealth via [employer], both the Patient Home and [facility]. .      Assessment & Plan:       ICD-10-CM ICD-9-CM   1. Other cerebral palsy (HCC) G80.8 343.8   2. Thrombocytopenia (HCC) D69.6 287.5   3. Acquired hypothyroidism E03.9 244.9   4. Scoliosis, unspecified scoliosis type, unspecified spinal region M41.9 737.30   5. Seizures (HCC) R56.9 780.39   6. Osteopenia, unspecified location M85.80 733.90   7. Requires continuous supervision for activities of daily living (ADL) Z74.3 V49.89     Orders Placed This Encounter    CBC WITH AUTOMATED DIFF    METABOLIC PANEL, COMPREHENSIVE    T4, FREE    TSH 3RD GENERATION    VITAMIN D, 25 HYDROXY    glycerin-mineral oil-lanolin (EUCERIN PLUS) topical cream     Reviewed chart as well as most recent orders for group [place]. Will check labs above, and will send to specialists. Having some dry skin, thus start eucerin. Plan on annual visit in fall 2020. CC:  Aimee Osborne MD (Ophthalmology)  Perry Montesinos MD (Gastroenterology)  Zuly Salas MD (Psychiatry)  Cesilia Biggs MD (Hematology and Oncology)  Samuel Fischer MD (Orthopedic Surgery)  Herbert Brown DPM (Podiatry)      Total time: conference, lab review, chart/note review, med review = 40      712  Subjective:   Rachelle Valiente is a 52 y.o. male who was seen for Transfer Of Care    Visit conducted with a nurse and caregiver.      Caregiver reports he has been living in the group home for several years. His medication list is correct. He sees a dentist every 4 months. He sees an optometrist annually. He sees a Gastroenterologist, [facility]. He sees neurology every 6 mo. He sees hemology, but not regularly. He sees ortho and podiatry. He needs assistance with all ADLs and has required this assistance since birth. The [facility] has guardianship of the pt. He takes pureed meals but takes medications through G-tube. He has a neurology appt coming up in June. He has general dry skin over most of his body. Prior to Admission medications    Medication Sig Start Date End Date Taking? Authorizing Provider   clonazePAM (KlonoPIN) 0.5 mg TbDi disintegrating tablet Take 0.5 mg by mouth once as needed. For seizure >3min; if persists after rx, call 911   Yes Provider, Historical   glycerin-mineral oil-lanolin (EUCERIN PLUS) topical cream Apply  to affected area as needed for Dry Skin. 5/19/20  Yes Rashida Joseph MD   diazePAM (VALIUM) 10 mg tablet Take 1 hour before eye exam  Crushed and administered through G-tube 2/21/20  Yes Breanne Harvey MD   calcitonin, salmon, (MIACALCIN) nasal USE 1 SPRAY IN ONE NOSTRIL DAILY FOR OSTEOPOROSIS - ALTERNATE NOSTRILS DAILY - **REFRIGERATE UNTIL OPENED;THEN STORE AT ROOM TEMP.;DISCARD 3 4/29/19  Yes Amita Marquis NP   zonisamide (ZONEGRAN) 100 mg capsule Take 600 mg by mouth daily. Open 6 capsules, dissolve contents in water and administer via g-tube daily   Yes Provider, Historical   folic acid-vit Q1-XPN B88 (FOLBIC) 2.5-25-2 mg tablet TAKE 1 TAB VIA G-TUBE 2 TIMES DAILY FOR FOLATE DEFICIENCY - CRUSH ANDDISSOLVE IN H2O (2/2.5/25) 1/8/19  Yes Amita Marquis NP   baclofen (LIORESAL) 20 mg tablet TAKE 1 TAB VIA G-TUBE 3 TIMES DAILY FOR SPASTICITY - CRUSH AND DISSOLVE IN H2O 11/7/18  Yes Amita Marquis NP   diclofenac (VOLTAREN) 1 % gel Apply  to affected area four (4) times daily.  6/1/18  Yes Elkin Winchester MD   nystatin (MYCOSTATIN) powder Apply to affected area twice daily with diaper change 4/20/18  Yes Delon Leonardo MD   mupirocin calcium (BACTROBAN) 2 % topical cream Apply  to affected area two (2) times a day. 4/20/18  Yes Delon Leonardo MD   levothyroxine (SYNTHROID) 25 mcg tablet Take 1 tab via g-tube once daily   Crush and dissolve in h20 10/20/17  Yes Delon Leonardo MD   calcium-vitamin D (OYSTER SHELL) 500 mg(1,250mg) -200 unit per tablet Take 1 Tab by mouth two (2) times daily (with meals). 10/20/17  Yes Delon Leonardo MD   sorbitol 70 % solution Take 15 ml via g-tube in the morning to maintain bowel regularity 10/20/17  Yes Lo Clinton MD   loratadine (CLARITIN) 10 mg tablet Take 1 Tab by mouth daily as needed for Allergies. 10/20/17  Yes Delon Leonardo MD   MULTIVITAMIN,THERAPEUTIC (THERAVITE PO) Take  by mouth. Yes Provider, Historical   lacosamide (VIMPAT) 200 mg tab tablet Take 200 mg by mouth two (2) times a day. Yes Provider, Historical   OTHER     Provider, Historical   menthol-zinc oxide (CALMOSEPTINE) 0.44-20.6 % oint APPLY TOPICALLY TO G-TUBE SITE DAILY TO PREVENT OVERGROWTH AND IRRITATION AT SITE 1/9/19   Obey Estrella NP   phenytoin (DILANTIN) 50 mg chewable tablet TAKE 1 TAB IN THE MORNING @0600 AND 2 TABS IN THE EVENING @1900 ON ODD DAYS; AND TAKE 2 TABS 2 TIMES DAILY @ 0600 AND 1900 ON EVEN DAYS;ARIAS 1/8/19 5/20/20  Obey Estrella NP   ibuprofen (MOTRIN) 600 mg tablet 1 Tab by Per G Tube route every eight (8) hours as needed for Pain. 9/17/18   Obey Estrella NP   Retapamulin (ALTABAX) 1 % ointment Apply  to affected area two (2) times a day.  4/20/18   Delon Leonardo MD   levETIRAcetam (KEPPRA) 100 mg/mL solution 5 ml twice a day 1/17/18 5/20/20  Delon Leonardo MD   OTHER ACO as needed 2/22/17   Delon Leonardo MD   OTHER Stander for spine alignment  Strap for toe straightening 9/7/16   Delon Leonardo MD   EUCALYP/ME-SALICYLATE/MEN/THYM (LISTERINE MM) by Mucous Membrane route. Provider, Historical   BABY OIL, MINERAL OIL, EX by Apply Externally route. Provider, Historical   LORazepam (INTENSOL) 2 mg/mL concentrated solution Take 1 mg by mouth every eight (8) hours as needed for Anxiety. 5/20/20  Provider, Historical     Allergies   Allergen Reactions    Avelox [Moxifloxacin] Seizures    Lamictal [Lamotrigine] Rash    Lidocaine Other (comments)    Lyrica [Pregabalin] Other (comments)    Valproic Acid Other (comments)       Patient Active Problem List    Diagnosis Date Noted    Scoliosis deformity of spine 02/14/2019    Other cerebral [facility]y [facility] 01/17/2018    Seizures ([facility] 01/17/2018    Thyroid disease 01/17/2018    Thrombocytopenia ([facility] 02/18/2012       Allergies   Allergen Reactions    Avelox [Moxifloxacin] Seizures    Lamictal [Lamotrigine] Rash    Lidocaine Other (comments)    Lyrica [Pregabalin] Other (comments)    Valproic Acid Other (comments)     Past Medical History:   Diagnosis Date    [facility]bral [facility] ([facility]     Scoliosis     Seizures ([facility]     Thyroid disease      Past Surgical History:   Procedure Laterality Date    ABDOMEN SURGERY PROC UNLISTED      g-tube    HX ORTHOPAEDIC      foot surgery     No family history on file. Social History     Tobacco Use    Smoking status: Never Smoker    Smokeless tobacco: Never Used   Substance Use Topics    Alcohol use: No       Review of Systems   Unable to perform ROS: Mental acuity           Objective:   Vital Signs: (As obtained by patient/caregiver at home)  There were no vitals taken for this visit.   Physical Exam  [ not performed - audio only ]     Labs / Results:     Lab Results   Component Value Date/Time    WBC 4.8 03/04/2019 02:24 PM    HGB 15.2 03/04/2019 02:24 PM    HCT 44.0 03/04/2019 02:24 PM    PLATELET 921 83/36/4355 02:24 PM    MCV 92.8 03/04/2019 02:24 PM       Lab Results   Component Value Date/Time    Sodium 141 11/06/2018 01:49 PM    Potassium 3.9 11/06/2018 01:49 PM    Chloride 108 11/06/2018 01:49 PM    CO2 30 11/06/2018 01:49 PM    Anion gap 3 11/06/2018 01:49 PM    Glucose 82 11/06/2018 01:49 PM    BUN 8 11/06/2018 01:49 PM    Creatinine 0.90 11/06/2018 01:49 PM    BUN/Creatinine ratio 9 (L) 11/06/2018 01:49 PM    GFR est AA >60 11/06/2018 01:49 PM    GFR est non-AA >60 11/06/2018 01:49 PM    Calcium 8.4 (L) 11/06/2018 01:49 PM    Bilirubin, total 0.2 08/09/2017 07:31 AM    AST (SGOT) 20 08/09/2017 07:31 AM    Alk. phosphatase 104 08/09/2017 07:31 AM    Protein, total 7.0 08/09/2017 07:31 AM    Albumin 3.5 08/09/2017 07:31 AM    Globulin 3.5 08/09/2017 07:31 AM    A-G Ratio 1.0 08/09/2017 07:31 AM    ALT (SGPT) 31 08/09/2017 07:31 AM       Lab Results   Component Value Date/Time    Cholesterol, total 129 08/09/2017 07:31 AM    HDL Cholesterol 63 (H) 08/09/2017 07:31 AM    LDL, calculated 56.4 08/09/2017 07:31 AM    VLDL, calculated 9.6 08/09/2017 07:31 AM    Triglyceride 48 08/09/2017 07:31 AM    CHOL/HDL Ratio 2.0 08/09/2017 07:31 AM       Lab Results   Component Value Date/Time    TSH 1.60 11/06/2018 01:49 PM    T4, Free 1.0 11/06/2018 01:49 PM              We discussed the expected course, resolution and complications of the diagnosis(es) in detail. Medication risks, benefits, costs, interactions, and alternatives were discussed as indicated. I advised him to contact the office if his condition worsens, changes or fails to improve as anticipated. He expressed understanding with the diagnosis(es) and plan. Jared Louise is a 52 y.o. male who was evaluated by an audio only encounter for concerns as above. Patient identification was verified prior to start of the visit. A caregiver was present when appropriate. Due to this being a TeleHealth encounter (During TKXGE-00 public health emergency), evaluation of the following organ systems was limited: Vitals/Constitutional/EENT/Resp/CV/GI//MS/Neuro/Skin/Heme-Lymph-Imm.   Pursuant to the emergency declaration under the [facility], [address] [place] [address] authority and the [employer] Act, this Virtual Visit was conducted, with patient's (and/or legal guardian's) consent, to reduce the patient's risk of exposure to COVID-19 and provide necessary medical care. Services were provided through a synchronous discussion virtually to substitute for in-person clinic visit. I was in the office. The patient was at home. Written by Jay Ruiz ATC as scribe, as dictated by Dr. Deniz Ramos. Samantha Burciaga MD, confirm that all documentation is accurate.     Tushar Capps MD  Internal Medicine, Family Medicine & Sports Medicine

## 2020-05-20 ENCOUNTER — HOSPITAL ENCOUNTER (OUTPATIENT)
Dept: LAB | Age: 50
Discharge: HOME | End: 2020-05-20
Payer: MEDICARE

## 2020-05-20 DIAGNOSIS — D69.6 THROMBOCYTOPENIA (HCC): ICD-10-CM

## 2020-05-20 DIAGNOSIS — M85.80 OSTEOPENIA, UNSPECIFIED LOCATION: ICD-10-CM

## 2020-05-20 DIAGNOSIS — R56.9 SEIZURES (HCC): ICD-10-CM

## 2020-05-20 DIAGNOSIS — E03.9 ACQUIRED HYPOTHYROIDISM: ICD-10-CM

## 2020-05-20 PROBLEM — Z74.3 REQUIRES CONTINUOUS SUPERVISION FOR ACTIVITIES OF DAILY LIVING (ADL): Status: ACTIVE | Noted: 2020-05-20

## 2020-05-20 LAB
25(OH)D3 SERPL-MCNC: 36.3 NG/ML (ref 30–100)
ALBUMIN SERPL-MCNC: 3.5 G/DL (ref 3.4–5)
ALBUMIN/GLOB SERPL: 1 {RATIO} (ref 0.8–1.7)
ALP SERPL-CCNC: 106 U/L (ref 45–117)
ALT SERPL-CCNC: 46 U/L (ref 16–61)
ANION GAP SERPL CALC-SCNC: 5 MMOL/L (ref 3–18)
AST SERPL-CCNC: 19 U/L (ref 10–38)
BASOPHILS # BLD: 0 K/UL (ref 0–0.1)
BASOPHILS NFR BLD: 0 % (ref 0–2)
BILIRUB SERPL-MCNC: 0.3 MG/DL (ref 0.2–1)
BUN SERPL-MCNC: 13 MG/DL (ref 7–18)
BUN/CREAT SERPL: 12 (ref 12–20)
CALCIUM SERPL-MCNC: 8.9 MG/DL (ref 8.5–10.1)
CHLORIDE SERPL-SCNC: 110 MMOL/L (ref 100–111)
CO2 SERPL-SCNC: 28 MMOL/L (ref 21–32)
CREAT SERPL-MCNC: 1.1 MG/DL (ref 0.6–1.3)
DIFFERENTIAL METHOD BLD: ABNORMAL
EOSINOPHIL # BLD: 0.3 K/UL (ref 0–0.4)
EOSINOPHIL NFR BLD: 5 % (ref 0–5)
ERYTHROCYTE [DISTWIDTH] IN BLOOD BY AUTOMATED COUNT: 13.5 % (ref 11.6–14.5)
GLOBULIN SER CALC-MCNC: 3.4 G/DL (ref 2–4)
GLUCOSE SERPL-MCNC: 91 MG/DL (ref 74–99)
HCT VFR BLD AUTO: 47.2 % (ref 36–48)
HGB BLD-MCNC: 15.8 G/DL (ref 13–16)
LYMPHOCYTES # BLD: 1.5 K/UL (ref 0.9–3.6)
LYMPHOCYTES NFR BLD: 33 % (ref 21–52)
MCH RBC QN AUTO: 31.6 PG (ref 24–34)
MCHC RBC AUTO-ENTMCNC: 33.5 G/DL (ref 31–37)
MCV RBC AUTO: 94.4 FL (ref 74–97)
MONOCYTES # BLD: 0.4 K/UL (ref 0.05–1.2)
MONOCYTES NFR BLD: 8 % (ref 3–10)
NEUTS SEG # BLD: 2.5 K/UL (ref 1.8–8)
NEUTS SEG NFR BLD: 54 % (ref 40–73)
PLATELET # BLD AUTO: 163 K/UL (ref 135–420)
PMV BLD AUTO: 12.5 FL (ref 9.2–11.8)
POTASSIUM SERPL-SCNC: 4.7 MMOL/L (ref 3.5–5.5)
PROT SERPL-MCNC: 6.9 G/DL (ref 6.4–8.2)
RBC # BLD AUTO: 5 M/UL (ref 4.7–5.5)
SODIUM SERPL-SCNC: 143 MMOL/L (ref 136–145)
T4 FREE SERPL-MCNC: 1.1 NG/DL (ref 0.7–1.5)
TSH SERPL DL<=0.05 MIU/L-ACNC: 1.78 UIU/ML (ref 0.36–3.74)
WBC # BLD AUTO: 4.6 K/UL (ref 4.6–13.2)

## 2020-05-20 PROCEDURE — 82306 VITAMIN D 25 HYDROXY: CPT

## 2020-05-20 PROCEDURE — 84443 ASSAY THYROID STIM HORMONE: CPT

## 2020-05-20 PROCEDURE — 36415 COLL VENOUS BLD VENIPUNCTURE: CPT

## 2020-05-20 PROCEDURE — 85025 COMPLETE CBC W/AUTO DIFF WBC: CPT

## 2020-05-20 PROCEDURE — 80053 COMPREHEN METABOLIC PANEL: CPT

## 2020-05-20 PROCEDURE — 84439 ASSAY OF FREE THYROXINE: CPT

## 2020-05-20 RX ORDER — CLONAZEPAM 0.5 MG/1
0.5 TABLET, ORALLY DISINTEGRATING ORAL
COMMUNITY

## 2020-05-26 ENCOUNTER — TELEPHONE (OUTPATIENT)
Dept: FAMILY MEDICINE CLINIC | Age: 50
End: 2020-05-26

## 2020-05-26 NOTE — TELEPHONE ENCOUNTER
Nurse called to request lab results for patient as well as they be faxed.   [email or web address]   Phone [phone]  Fax [phone]

## 2020-06-24 RX ORDER — MENTHOL/ZINC OXIDE 0.44-20.6%
OINTMENT (GRAM) TOPICAL
Qty: 113 G | Refills: 2 | Status: SHIPPED | OUTPATIENT
Start: 2020-06-24

## 2020-10-22 RX ORDER — CALCITONIN SALMON 200 [IU]/.09ML
SPRAY, METERED NASAL
Qty: 3.7 ML | Refills: 4 | Status: SHIPPED | OUTPATIENT
Start: 2020-10-22 | End: 2021-04-26

## 2020-10-22 RX ORDER — PHENOLPHTHALEIN 90 MG
TABLET,CHEWABLE ORAL
Qty: 300 ML | Refills: 4 | Status: SHIPPED | OUTPATIENT
Start: 2020-10-22 | End: 2021-04-26

## 2020-10-22 RX ORDER — IBUPROFEN 200 MG
CAPSULE ORAL
Qty: 60 TAB | Refills: 4 | Status: SHIPPED | OUTPATIENT
Start: 2020-10-22 | End: 2021-04-26

## 2020-10-22 RX ORDER — MULTIVIT-MIN/FA/LYCOPEN/LUTEIN .4-300-25
TABLET ORAL
Qty: 30 TAB | Refills: 4 | Status: SHIPPED | OUTPATIENT
Start: 2020-10-22 | End: 2021-04-26

## 2020-10-22 RX ORDER — LEVOTHYROXINE SODIUM 25 UG/1
TABLET ORAL
Qty: 30 TAB | Refills: 4 | Status: SHIPPED | OUTPATIENT
Start: 2020-10-22 | End: 2021-04-26

## 2020-12-08 RX ORDER — SORBITOL SOLUTION 70 %
SOLUTION, ORAL MISCELLANEOUS
Qty: 465 ML | Refills: 10 | Status: SHIPPED | OUTPATIENT
Start: 2020-12-08

## 2021-03-29 RX ORDER — BACLOFEN 20 MG/1
TABLET ORAL
Qty: 270 TAB | Refills: 1 | Status: SHIPPED | OUTPATIENT
Start: 2021-03-29 | End: 2021-03-31

## 2021-04-21 ENCOUNTER — OFFICE VISIT (OUTPATIENT)
Dept: FAMILY MEDICINE CLINIC | Age: 51
End: 2021-04-21
Payer: MEDICARE

## 2021-04-21 VITALS
BODY MASS INDEX: 17.27 KG/M2 | HEART RATE: 93 BPM | RESPIRATION RATE: 16 BRPM | HEIGHT: 67 IN | WEIGHT: 110 LBS | SYSTOLIC BLOOD PRESSURE: 102 MMHG | DIASTOLIC BLOOD PRESSURE: 86 MMHG | TEMPERATURE: 97.7 F | OXYGEN SATURATION: 99 %

## 2021-04-21 DIAGNOSIS — G80.8 OTHER CEREBRAL PALSY (HCC): ICD-10-CM

## 2021-04-21 DIAGNOSIS — Z11.59 NEED FOR HEPATITIS C SCREENING TEST: ICD-10-CM

## 2021-04-21 DIAGNOSIS — E55.9 VITAMIN D DEFICIENCY: ICD-10-CM

## 2021-04-21 DIAGNOSIS — Z00.00 MEDICARE ANNUAL WELLNESS VISIT, INITIAL: Primary | ICD-10-CM

## 2021-04-21 DIAGNOSIS — D69.6 THROMBOCYTOPENIA (HCC): ICD-10-CM

## 2021-04-21 DIAGNOSIS — E03.9 ACQUIRED HYPOTHYROIDISM: ICD-10-CM

## 2021-04-21 DIAGNOSIS — Z12.11 SCREENING FOR COLON CANCER: ICD-10-CM

## 2021-04-21 DIAGNOSIS — M85.80 OSTEOPENIA, UNSPECIFIED LOCATION: ICD-10-CM

## 2021-04-21 PROCEDURE — G8510 SCR DEP NEG, NO PLAN REQD: HCPCS | Performed by: LEGAL MEDICINE

## 2021-04-21 PROCEDURE — G8419 CALC BMI OUT NRM PARAM NOF/U: HCPCS | Performed by: LEGAL MEDICINE

## 2021-04-21 PROCEDURE — 3017F COLORECTAL CA SCREEN DOC REV: CPT | Performed by: LEGAL MEDICINE

## 2021-04-21 PROCEDURE — G0438 PPPS, INITIAL VISIT: HCPCS | Performed by: LEGAL MEDICINE

## 2021-04-21 PROCEDURE — G8427 DOCREV CUR MEDS BY ELIG CLIN: HCPCS | Performed by: LEGAL MEDICINE

## 2021-04-21 NOTE — PROGRESS NOTES
(AWV) The Initial Medicare Annual Wellness Exam PROGRESS NOTE    This is an Initial Medicare Annual Wellness Exam (AWV) (Performed 12 months after IPPE or effective date of Medicare Part B enrollment, Once in a lifetime)    I have reviewed the patient's medical history in detail and updated the computerized patient record. Kisha Anderson is a 48 y.o.   male and presents for an annual wellness exam     He is here accompanied by a nurse who knows him for over 1 year at the group [facility]     Patient Active Problem List   Diagnosis Code    Other cerebral palsy ([facility] [address] G80.8    Seizures ([address] R56.9    Acquired hypothyroidism E03.9    Thrombocytopenia ([address] D69.6    Scoliosis deformity of spine M41.9    Requires continuous supervision for activities of daily living (ADL) Z74.3     Patient Active Problem List    Diagnosis Date Noted    Requires continuous supervision for activities of daily living (ADL) 05/20/2020    Scoliosis deformity of spine 02/14/2019    Other cerebral palsy ([facility] [address] 01/17/2018    Seizures ([facility] [address] 01/17/2018    Acquired hypothyroidism 01/17/2018    Thrombocytopenia ([facility] [address] 02/18/2012     Current Outpatient Medications   Medication Sig Dispense Refill    baclofen (LIORESAL) 20 mg tablet TAKE 1 TAB VIA G-TUBE 3 TIMES DAILY FOR SPASTICITY - CRUSH AND DISSOLVE IN H2O 90 Tab 5    eyelid cleanser combination 3 (OcuSoft Lid Scrub Plus) padm USE 1 PAD TO SCRUB BOTH EYELIDS ONCE A DAY 30 Each 5    zonisamide (ZONEGRAN) 100 mg capsule OPEN AND DISSOLVE 6 CAPSULES IN WATER AND ADMINSTER VIA G-TUBE 540 Cap 1    sorbitoL 70 % solution TAKE 15ML VIA G-TUBE IN THE MORNING TO MAINTAIN BOWEL REGULARITY 465 mL 10    calcitonin, salmon, (MIACALCIN) nasal USE 1 SPRAY IN ONE NOSTRIL DAILY FOR OSTEOPOROSIS - ALTERNATE NOSTRILS DAILY - **REFRIGERATE UNTIL OPENED;THEN STORE AT ROOM TEMP.;DISCARD 3 3.7 mL 4    CertaVite Senior-Antioxidant 0.4-300-250 mg-mcg-mcg tab CRUSH 1 TABLET AND GIVE VIA G-TUBE ONCE DAILY 30 Tab 4    CALCIUM 500+D 500 mg(1,250mg) -200 unit per tablet TAKE 1 TAB VIA G-TUBE TWICE DAILY FOR NUTRITIONAL SUPPLEMENT - CRUSH AND DISSOLVE IN H2O 60 Tab 4    levothyroxine (SYNTHROID) 25 mcg tablet TAKE 1 TAB VIA G-TUBE ONCE DAILY FOR HYPOTHYROIDISM - CRUSH AND DISSOLVE IN H2O 30 Tab 4    loratadine (CLARITIN) 5 mg/5 mL syrup TAKE 10ML BY MOUTH DAILY FOR ALLERGIES 300 mL 4    menthol-zinc oxide (Calmoseptine) 0.44-20.6 % oint APPLY TOPICALLY TO G-TUBE SITE DAILY TO PREVENT OVERGROWTH AND IRRITATION AT SITE 113 g 2    clonazePAM (KlonoPIN) 0.5 mg TbDi disintegrating tablet Take 0.5 mg by mouth once as needed. For seizure >3min; if persists after rx, call 911      glycerin-mineral oil-lanolin (EUCERIN PLUS) topical cream Apply  to affected area as needed for Dry Skin. 226 g 5    diazePAM (VALIUM) 10 mg tablet Take 1 hour before eye exam  Crushed and administered through G-tube 1 Tab 3    OTHER       folic acid-vit C8-LRV Y41 (FOLBIC) 2.5-25-2 mg tablet TAKE 1 TAB VIA G-TUBE 2 TIMES DAILY FOR FOLATE DEFICIENCY - CRUSH ANDDISSOLVE IN H2O (2/2.5/25) 62 Tab 0    ibuprofen (MOTRIN) 600 mg tablet 1 Tab by Per G Tube route every eight (8) hours as needed for Pain. 90 Tab 0    diclofenac (VOLTAREN) 1 % gel Apply  to affected area four (4) times daily. 500 g 0    nystatin (MYCOSTATIN) powder Apply to affected area twice daily with diaper change 60 g 3    Retapamulin (ALTABAX) 1 % ointment Apply  to affected area two (2) times a day. 30 g 0    mupirocin calcium (BACTROBAN) 2 % topical cream Apply  to affected area two (2) times a day. 15 g 0    loratadine (CLARITIN) 10 mg tablet Take 1 Tab by mouth daily as needed for Allergies. 90 Tab 3    OTHER ACO as needed 1 Each 0    OTHER Stander for spine alignment  Strap for toe straightening 1 Each 0    MULTIVITAMIN,THERAPEUTIC (THERAVITE PO) Take  by mouth.  lacosamide (VIMPAT) 200 mg tab tablet Take 200 mg by mouth two (2) times a day.       EUCALYP/ME-SALICYLATE/MEN/THYM (LISTERINE MM) by Mucous Membrane route.  BABY OIL, MINERAL OIL, EX by Apply Externally route.        Allergies   Allergen Reactions    Avelox [Moxifloxacin] Seizures    Lamictal [Lamotrigine] Rash    Lidocaine Other (comments)    Lyrica [Pregabalin] Other (comments)    Valproic Acid Other (comments)     Past Medical History:   Diagnosis Date    [facility] ([facility] [address]     Scoliosis     Seizures ([facility] [address]     Thyroid disease      Past Surgical History:   Procedure Laterality Date    HX ORTHOPAEDIC      foot surgery    LA ABDOMEN SURGERY PROC UNLISTED      g-tube     Family History   Problem Relation Age of Onset    Diabetes Sister      Social History     Tobacco Use    Smoking status: Never Smoker    Smokeless tobacco: Never Used   Substance Use Topics    Alcohol use: No       ROS     History obtained from  Jarrett Antoniowing his nurse  General ROS: negative for - chills, fatigue or fever  Psychological ROS: no able to assess   Ophthalmic ROS: no able to assess   ENT ROS: negative for - epistaxis, nasal congestion or nasal discharge  Allergy and Immunology ROS: negative  negative for - hives, insect bite sensitivity, itchy/watery eyes or nasal congestion  Hematological and Lymphatic ROS: negative for - bleeding problems, blood clots or blood transfusions      Respiratory ROS: no cough, shortness of breath, or wheezing  Cardiovascular ROS: negative for - dyspnea on exertion or edema  Gastrointestinal ROS: no abdominal pain, change in bowel habits, or black or bloody stools        History     Past Medical History:   Diagnosis Date    Cerebral palsy (HCC)     Scoliosis     Seizures (HCC)     Thyroid disease       Past Surgical History:   Procedure Laterality Date    HX ORTHOPAEDIC      foot surgery    LA ABDOMEN SURGERY PROC UNLISTED      g-tube     Current Outpatient Medications   Medication Sig Dispense Refill    baclofen (LIORESAL) 20 mg tablet TAKE 1 TAB VIA G-TUBE 3 TIMES DAILY FOR SPASTICITY - CRUSH AND DISSOLVE IN H2O 90 Tab 5    eyelid cleanser combination 3 (OcuSoft Lid Scrub Plus) padm USE 1 PAD TO SCRUB BOTH EYELIDS ONCE A DAY 30 Each 5    zonisamide (ZONEGRAN) 100 mg capsule OPEN AND DISSOLVE 6 CAPSULES IN WATER AND ADMINSTER VIA G-TUBE 540 Cap 1    sorbitoL 70 % solution TAKE 15ML VIA G-TUBE IN THE MORNING TO MAINTAIN BOWEL REGULARITY 465 mL 10    calcitonin, salmon, (MIACALCIN) nasal USE 1 SPRAY IN ONE NOSTRIL DAILY FOR OSTEOPOROSIS - ALTERNATE NOSTRILS DAILY - **REFRIGERATE UNTIL OPENED;THEN STORE AT ROOM TEMP.;DISCARD 3 3.7 mL 4    CertaVite Senior-Antioxidant 0.4-300-250 mg-mcg-mcg tab CRUSH 1 TABLET AND GIVE VIA G-TUBE ONCE DAILY 30 Tab 4    CALCIUM 500+D 500 mg(1,250mg) -200 unit per tablet TAKE 1 TAB VIA G-TUBE TWICE DAILY FOR NUTRITIONAL SUPPLEMENT - CRUSH AND DISSOLVE IN H2O 60 Tab 4    levothyroxine (SYNTHROID) 25 mcg tablet TAKE 1 TAB VIA G-TUBE ONCE DAILY FOR HYPOTHYROIDISM - CRUSH AND DISSOLVE IN H2O 30 Tab 4    loratadine (CLARITIN) 5 mg/5 mL syrup TAKE 10ML BY MOUTH DAILY FOR ALLERGIES 300 mL 4    menthol-zinc oxide (Calmoseptine) 0.44-20.6 % oint APPLY TOPICALLY TO G-TUBE SITE DAILY TO PREVENT OVERGROWTH AND IRRITATION AT SITE 113 g 2    clonazePAM (KlonoPIN) 0.5 mg TbDi disintegrating tablet Take 0.5 mg by mouth once as needed. For seizure >3min; if persists after rx, call 911      glycerin-mineral oil-lanolin (EUCERIN PLUS) topical cream Apply  to affected area as needed for Dry Skin. 226 g 5    diazePAM (VALIUM) 10 mg tablet Take 1 hour before eye exam  Crushed and administered through G-tube 1 Tab 3    OTHER       folic acid-vit C6-HPF S99 (FOLBIC) 2.5-25-2 mg tablet TAKE 1 TAB VIA G-TUBE 2 TIMES DAILY FOR FOLATE DEFICIENCY - CRUSH ANDDISSOLVE IN H2O (2/2.5/25) 62 Tab 0    ibuprofen (MOTRIN) 600 mg tablet 1 Tab by Per G Tube route every eight (8) hours as needed for Pain.  90 Tab 0    diclofenac (VOLTAREN) 1 % gel Apply  to affected area four (4) times daily. 500 g 0    nystatin (MYCOSTATIN) powder Apply to affected area twice daily with diaper change 60 g 3    Retapamulin (ALTABAX) 1 % ointment Apply  to affected area two (2) times a day. 30 g 0    mupirocin calcium (BACTROBAN) 2 % topical cream Apply  to affected area two (2) times a day. 15 g 0    loratadine (CLARITIN) 10 mg tablet Take 1 Tab by mouth daily as needed for Allergies. 90 Tab 3    OTHER ACO as needed 1 Each 0    OTHER Stander for spine alignment  Strap for toe straightening 1 Each 0    MULTIVITAMIN,THERAPEUTIC (THERAVITE PO) Take  by mouth.  lacosamide (VIMPAT) 200 mg tab tablet Take 200 mg by mouth two (2) times a day.  EUCALYP/ME-SALICYLATE/MEN/THYM (LISTERINE MM) by Mucous Membrane route.  BABY OIL, MINERAL OIL, EX by Apply Externally route. Allergies   Allergen Reactions    Avelox [Moxifloxacin] Seizures    Lamictal [Lamotrigine] Rash    Lidocaine Other (comments)    Lyrica [Pregabalin] Other (comments)    Valproic Acid Other (comments)     Family History   Problem Relation Age of Onset    Diabetes Sister      Social History     Tobacco Use    Smoking status: Never Smoker    Smokeless tobacco: Never Used   Substance Use Topics    Alcohol use: No     Patient Active Problem List   Diagnosis Code    Other cerebral palsy ([facility]) G80.8    Seizures ([facility] [address] R56.9    Acquired hypothyroidism E03.9    Thrombocytopenia ([facility]) D69.6    Scoliosis deformity of spine M41.9    Requires continuous supervision for activities of daily living (ADL) Z74.3       Health Maintenance History  Immunizations reviewed,   He is due dtap  , zoster   Colonoscopy: , cologaurd     Eye exam:he is seeing ophthalmologist           Depression Risk Factor Screening:      Patient Health Questionnaire (PHQ-2)   Over the last 2 weeks, how often have you been bothered by any of the following problems? · Little interest or pleasure in doing things?   · Non verbal   · Feeling down, depressed, or hopeless? · Non verbal     Total Score: not able to obtain   PHQ-2 Assessment Scoring:   A score of 2 or more requires further screening with the PHQ-9    Alcohol Risk Factor Screening:     Women: On any occasion during the past 3 months, have you had more than 3 drinks containing alcohol? Do you average more than 7 drinks per week? Men: On any occasion during the past 3 months, have you had more than 4 drinks containing alcohol? No   Do you average more than 14 drinks per week? No     Functional Ability and Level of Safety:     Hearing Loss    Hearing is good. Activities of Daily Living   Total assistance. Requires assistance with: ambulation, bathing and hygiene, feeding, continence, grooming, toileting, dressing and no ADLs    Fall Risk   Secondary diagnoses (15 pts)  Ambulatory aid device (15 pts)  pateint is non verbal     Abuse Screen       Examination   Physical Examination  Vitals:    04/21/21 1412   BP: 102/86   Pulse: 93   Resp: 16   Temp: 97.7 °F (36.5 °C)   TempSrc: Temporal   SpO2: 99%   Weight: 110 lb (49.9 kg)   Height: 5' 7\" (1.702 m)      Body mass index is 17.23 kg/m².      Evaluation of Cognitive Function:  Mood/affect:basline   Appearance:in wheel chair   Family member/caregiver input:    He is wheel chair , sleepy no distress     Patient Care Team:  Shruthi Mercado MD as PCP - General (Family Medicine)  Shruthi Mercado MD as PCP - [address] [place] [address] Provider  Gillian Whitman MD (Ophthalmology)  Dario Granados MD (Gastroenterology)  Daisy Pardo MD (Psychiatry)  Uriel Zacarias MD (Hematology and Oncology)  Nafisa Mcdaniel MD (Orthopedic Surgery)  Violette Salas DPM (Podiatry)    End-of-life planning  Advanced Directive in the case than an injury or illness causes the patient to be unable to make health care decisions    Health Care Directive or Living Will: yes    Advice/Referrals/Counselling/Plan:   Education and counseling provided:  Are appropriate based on today's review and evaluation  Prostate cancer screening tests (PSA, covered annually)  Colorectal cancer screening tests  Screening for glaucoma  Include in education list (weight loss, physical activity, smoking cessation, fall prevention, and nutrition)    ICD-10-CM ICD-9-CM    1. Medicare annual wellness visit, initial  Q97.03 N59.5 METABOLIC PANEL, COMPREHENSIVE      URINALYSIS W/MICROSCOPIC   2. Screening for colon cancer  Z12.11 V76.51 COLOGUARD TEST (FECAL DNA COLORECTAL CANCER SCREENING)   3. Other cerebral palsy (HCC)  G80.8 343.8    4. Thrombocytopenia (HCC)  O96.2 832.6 METABOLIC PANEL, COMPREHENSIVE      CBC WITH AUTOMATED DIFF   5. Acquired hypothyroidism  E03.9 244.9 TSH AND FREE T4      METABOLIC PANEL, COMPREHENSIVE   6. Osteopenia, unspecified location  M85.80 733.90 VITAMIN D, 25 HYDROXY   7. Vitamin D deficiency  E55.9 268.9 VITAMIN D, 25 HYDROXY   8. Need for hepatitis C screening test  Z11.59 V73.89 HEPATITIS C AB     Encounter Diagnoses   Name Primary?     Medicare annual wellness visit, initial Yes    Screening for colon cancer     Other cerebral pals[facility] ([facility] [address]     Thrombocytopenia ([facility] [address]     Acquired hypothyroidism     Osteopenia, unspecified location     Vitamin D deficiency     Need for hepatitis C screening test      Orders Placed This Encounter    COLOGUARD TEST (FECAL DNA COLORECTAL CANCER SCREENING)    TSH AND FREE T4    VITAMIN D, 25 HYDROXY    METABOLIC PANEL, COMPREHENSIVE    CBC WITH AUTOMATED DIFF    URINALYSIS W/MICROSCOPIC    HEPATITIS C AB     Orders Placed This Encounter    COLOGUARD TEST (FECAL DNA COLORECTAL CANCER SCREENING)    TSH AND FREE T4     Standing Status:   Future     Standing Expiration Date:   4/22/2022    VITAMIN D, 25 HYDROXY     Standing Status:   Future     Standing Expiration Date:   4/15/8079    METABOLIC PANEL, COMPREHENSIVE     Standing Status:   Future     Standing Expiration Date:   4/22/2022    CBC WITH AUTOMATED DIFF Standing Status:   Future     Standing Expiration Date:   4/22/2022    URINALYSIS W/MICROSCOPIC     Standing Status:   Future     Standing Expiration Date:   4/22/2022    HEPATITIS C AB     Standing Status:   Future     Standing Expiration Date:   4/22/2022     Orders Placed This Encounter    COLOGUARD TEST (FECAL DNA COLORECTAL CANCER SCREENING)    TSH AND FREE T4    VITAMIN D, 25 HYDROXY    METABOLIC PANEL, COMPREHENSIVE    CBC WITH AUTOMATED DIFF    URINALYSIS W/MICROSCOPIC    HEPATITIS C AB     Diagnoses and all orders for this visit:    1. Medicare annual wellness visit, initial  -     METABOLIC PANEL, COMPREHENSIVE; Future  -     URINALYSIS W/MICROSCOPIC; Future    2. Screening for colon cancer  -     COLOGUARD TEST (FECAL DNA COLORECTAL CANCER SCREENING)    3. Other cerebral palsy ([facility] [address]    4. Thrombocytopenia (HCC)  -     METABOLIC PANEL, COMPREHENSIVE; Future  -     CBC WITH AUTOMATED DIFF; Future    5. Acquired hypothyroidism  -     TSH AND FREE T4; Future  -     METABOLIC PANEL, COMPREHENSIVE; Future    6. Osteopenia, unspecified location  -     VITAMIN D, 25 HYDROXY; Future    7. Vitamin D deficiency  -     VITAMIN D, 25 HYDROXY; Future    8. Need for hepatitis C screening test  -     HEPATITIS C AB; Future      Follow-up and Dispositions    · Return in about 1 year (around 4/21/2022). current treatment plan is effective, no change in therapy  the following changes in treatment are made: No change   lab results and schedule of future lab studies reviewed with patient. Brief written plan, checklist    I have discussed the diagnosis with the patient and the intended plan as seen in the above orders. The patient has received an after-visit summary and questions were answered concerning future plans. I have discussed medication side effects and warnings with the patient as well. I have reviewed the plan of care with the patient, accepted their input and they are in agreement with the treatment goals. Follow-up and Dispositions    · Return in about 1 year (around 4/21/2022). ____________________________________________________________    Problem Assessment    for treatment of   Chief Complaint   Patient presents with    Transfer Of Care    Annual Wellness Visit       On physical exam he is on wheelchair  Head is atraumatic  Not able to examine nose and mouth due to COVID-19 infection precautions  Lungs are clear to auscultation bilaterally  Cardiovascular S1-S2 regular  Abdomen soft nontender feeding tube in place with no inflammation around it    Lower legs there is no edema    Patient is nonverbal and does not ambulate and he is wheelchair-bound    Diagnoses and all orders for this visit:    1. Medicare annual wellness visit, initial  -     METABOLIC PANEL, COMPREHENSIVE; Future  -     URINALYSIS W/MICROSCOPIC; Future    2. Screening for colon cancer  -     COLOGUARD TEST (FECAL DNA COLORECTAL CANCER SCREENING)    3. Other cerebral palsy ([facility] [address]    4. Thrombocytopenia (HCC)  -     METABOLIC PANEL, COMPREHENSIVE; Future  -     CBC WITH AUTOMATED DIFF; Future    5. Acquired hypothyroidism  -     TSH AND FREE T4; Future  -     METABOLIC PANEL, COMPREHENSIVE; Future    6. Osteopenia, unspecified location  -     VITAMIN D, 25 HYDROXY; Future    7. Vitamin D deficiency  -     VITAMIN D, 25 HYDROXY; Future    8. Need for hepatitis C screening test  -     HEPATITIS C AB;  Future          Lab review: orders written for new lab studies as appropriate; see orders

## 2021-04-21 NOTE — PROGRESS NOTES
Leila Mendoza is a 48 y.o. male (: 1970) presenting to address:    Chief Complaint   Patient presents with   [address] Annual Wellness Visit       Vitals:    21 1412   BP: 102/86   Pulse: 93   Resp: 16   Temp: 97.7 °F (36.5 °C)   TempSrc: Temporal   SpO2: 99%   Weight: 110 lb (49.9 kg)   Height: 5' 7\" (1.702 m)       Is someone accompanying this pt? YES janiya     Is the patient using any DME equipment during OV? YES    Hearing/Vision:   No exam data present    Learning Assessment:   No flowsheet data found. Depression Screening:     3 most recent PHQ Screens 2021   PHQ Not Done -   Little interest or pleasure in doing things Not at all   Feeling down, depressed, irritable, or hopeless Not at all   Total Score PHQ 2 0     Fall Risk Assessment:     Fall Risk Assessment, last 12 mths 2021   Able to walk? No     Coordination of Care Questionaire:   1. Have you been to the ER, urgent care clinic since your last visit? Hospitalized since your last visit? NO    2. Have you seen or consulted any other health care providers outside of the [address] [place] [address] since your last visit? Include any pap smears or colon screening. NO    Advanced Directive:   1. Do you have an Advanced Directive? NO    2. Would you like information on Advanced Directives?  NO

## 2021-04-24 NOTE — TELEPHONE ENCOUNTER
Pt's caregiver Shelia, called wanting to know when Dr Genaro Hollingsworth wanted to have Mr Inna Valero come in for his blood work. He has an upcoming appt with him on 1/17/18. Please advise. Subjective   Patient ID: Ashley is a 66 year old female.    Chief Complaint   Patient presents with   • Follow-up     Pt S/P Caudal TAMMY on 2020     Patient followed up today to report that she got a days worth of relief with the caudal TAMMY but symptoms came back.  Patient still reports that she was doing well until the battery was exchanged over IPG and that is when the pain started.      Ashley has a past medical history of Acute bronchitis, Arthritis, Broken arm (right), Failed back syndrome, HTN (hypertension), Other chronic pain, and Pneumonia.  Ashley has Contusion of wrist; Osteoarthrosis involving lower leg; Thoracic or lumbosacral neuritis or radiculitis, unspecified; Orthostatic hypotension; HTN (hypertension); Other chronic pain; Pneumonia; Acute bronchitis; Arthritis; Failed back syndrome of lumbar spine; SI (sacroiliac) joint dysfunction; S/P lumbar fusion; Broken arm; Bicytopenia; and Pain medication agreement signed on their problem list.  Ashley has a past surgical history that includes Lumbar fusion ();  section, classic; anesth,knee arthroscopy (Left); and spinal cord stimulator trial w/ laminotomy.  Her family history is not on file.  Ashley reports that she has never smoked. She has never used smokeless tobacco. She reports that she does not drink alcohol or use drugs.  Ashley has a current medication list which includes the following prescription(s): amlodipine, oxycodone-acetaminophen, lidocaine, latanoprost, baclofen, tizanidine, ibuprofen-famotidine, fluticasone, and lisinopril, and the following Facility-Administered Medications: propofol.  Ashley   Current Outpatient Medications   Medication Sig Dispense Refill   • amLODIPine (NORVASC) 5 MG tablet      • oxyCODONE-acetaminophen (PERCOCET) 5-325 MG per tablet Take 1 tablet by mouth every 6 hours.     • lidocaine (LIDODERM) 5 % patch MONALISA 1 TO 2 PATCHES EXT TO THE SKIN Q DAY. MAY WEAR UP TO 12 H     • latanoprost  (XALATAN) 0.005 % ophthalmic solution INT 1 GTT IN OU HS     • baclofen (LIORESAL) 10 MG tablet One tab twice daily as needed 180 tablet 0   • tizanidine (ZANAFLEX) 4 MG capsule Take 1 capsule by mouth 3 times daily as needed for Muscle spasms. 90 capsule 5   • Ibuprofen-Famotidine (DUEXIS) 800-26.6 MG Tab Take 800 mg by mouth 2 times daily. 60 tablet 2   • fluticasone (FLONASE) 50 MCG/ACT nasal spray Spray 2 sprays in each nostril daily. 16 g 12   • lisinopril (ZESTRIL) 20 MG tablet TAKE 1 TABLET DAILY 90 tablet 1     No current facility-administered medications for this visit.      Facility-Administered Medications Ordered in Other Visits   Medication Dose Route Frequency Provider Last Rate Last Dose   • PROPOFOL 10 MG/ML IV EMUL Pyxis Override              Ashley is allergic to skelaxin [metaxalone].    Review of Systems   Musculoskeletal: Positive for back pain.   All other systems reviewed and are negative.      Objective   Physical Exam  Vitals signs reviewed.   HENT:      Head: Normocephalic.      Nose: Nose normal.      Mouth/Throat:      Pharynx: Oropharynx is clear.   Cardiovascular:      Rate and Rhythm: Normal rate.      Pulses: Normal pulses.   Pulmonary:      Effort: Pulmonary effort is normal.   Musculoskeletal:         General: Tenderness present.   Skin:     General: Skin is warm and dry.   Neurological:      General: No focal deficit present.      Mental Status: She is alert and oriented to person, place, and time.   Psychiatric:         Mood and Affect: Mood normal.         Behavior: Behavior normal.         Assessment   Problem List Items Addressed This Visit        Other    Failed back syndrome of lumbar spine    S/P lumbar fusion - Primary         1.  We will have to discuss with [email or web address] if this is an IPG issue since patient was getting significant relief prior to the battery being changed.  We will have to understand if the programming or a IPG issue.  2.  We will have patient come  back in 2 weeks with [facility] Room was instructing the patient to bring all her equipment into be evaluated.  3.  Continue with medication through her primary.    Total time spent with patient was greater than 20 minutes.   Greater than 50% of the time was spent in the direct care of the patient.   No, not prescribed...

## 2021-04-26 RX ORDER — MULTIVIT-MIN/FA/LYCOPEN/LUTEIN .4-300-25
TABLET ORAL
Qty: 31 TAB | Refills: 0 | Status: SHIPPED | OUTPATIENT
Start: 2021-04-26 | End: 2021-06-03

## 2021-04-26 RX ORDER — LEVOTHYROXINE SODIUM 25 UG/1
TABLET ORAL
Qty: 31 TAB | Refills: 0 | Status: SHIPPED | OUTPATIENT
Start: 2021-04-26 | End: 2021-06-29

## 2021-04-26 RX ORDER — LANOLIN ALCOHOL/MO/W.PET/CERES
CREAM (GRAM) TOPICAL
Qty: 62 TAB | Refills: 0 | Status: SHIPPED | OUTPATIENT
Start: 2021-04-26 | End: 2021-06-29

## 2021-04-26 RX ORDER — CALCITONIN SALMON 200 [IU]/.09ML
SPRAY, METERED NASAL
Qty: 3.7 ML | Refills: 0 | Status: SHIPPED | OUTPATIENT
Start: 2021-04-26 | End: 2021-06-29

## 2021-04-26 RX ORDER — PHENOLPHTHALEIN 90 MG
TABLET,CHEWABLE ORAL
Qty: 310 ML | Refills: 0 | Status: SHIPPED | OUTPATIENT
Start: 2021-04-26 | End: 2021-06-29

## 2021-04-26 RX ORDER — POLYVINYL ALCOHOL 14 MG/ML
SOLUTION/ DROPS OPHTHALMIC
Qty: 15 ML | Refills: 0 | Status: SHIPPED | OUTPATIENT
Start: 2021-04-26

## 2021-04-26 RX ORDER — FOLIC ACID-PYRIDOXINE-CYANOCOBALAMIN TAB 2.5-25-2 MG 2.5-25-2 MG
TAB ORAL
Qty: 62 TAB | Refills: 0 | Status: SHIPPED | OUTPATIENT
Start: 2021-04-26

## 2021-05-06 DIAGNOSIS — G80.8 OTHER CEREBRAL PALSY (HCC): ICD-10-CM

## 2021-05-07 RX ORDER — DIAZEPAM 10 MG/1
TABLET ORAL
Qty: 1 TAB | Refills: 3 | Status: SHIPPED | OUTPATIENT
Start: 2021-05-07 | End: 2022-06-28

## 2021-05-10 ENCOUNTER — HOSPITAL ENCOUNTER (OUTPATIENT)
Dept: LAB | Age: 51
Discharge: HOME | End: 2021-05-10
Payer: MEDICARE

## 2021-05-10 ENCOUNTER — APPOINTMENT (OUTPATIENT)
Dept: FAMILY MEDICINE CLINIC | Age: 51
End: 2021-05-10

## 2021-05-10 DIAGNOSIS — Z11.59 NEED FOR HEPATITIS C SCREENING TEST: ICD-10-CM

## 2021-05-10 DIAGNOSIS — M85.80 OSTEOPENIA, UNSPECIFIED LOCATION: ICD-10-CM

## 2021-05-10 DIAGNOSIS — Z00.00 MEDICARE ANNUAL WELLNESS VISIT, INITIAL: ICD-10-CM

## 2021-05-10 DIAGNOSIS — E55.9 VITAMIN D DEFICIENCY: ICD-10-CM

## 2021-05-10 DIAGNOSIS — D69.6 THROMBOCYTOPENIA (HCC): ICD-10-CM

## 2021-05-10 DIAGNOSIS — E03.9 ACQUIRED HYPOTHYROIDISM: ICD-10-CM

## 2021-05-10 LAB
25(OH)D3 SERPL-MCNC: 41.7 NG/ML (ref 30–100)
ALBUMIN SERPL-MCNC: 3.3 G/DL (ref 3.4–5)
ALBUMIN/GLOB SERPL: 1.1 {RATIO} (ref 0.8–1.7)
ALP SERPL-CCNC: 96 U/L (ref 45–117)
ALT SERPL-CCNC: 37 U/L (ref 16–61)
ANION GAP SERPL CALC-SCNC: 9 MMOL/L (ref 3–18)
AST SERPL-CCNC: 20 U/L (ref 10–38)
BASOPHILS # BLD: 0 K/UL (ref 0–0.1)
BASOPHILS NFR BLD: 0 % (ref 0–2)
BILIRUB SERPL-MCNC: 0.2 MG/DL (ref 0.2–1)
BUN SERPL-MCNC: 13 MG/DL (ref 7–18)
BUN/CREAT SERPL: 14 (ref 12–20)
CALCIUM SERPL-MCNC: 8.8 MG/DL (ref 8.5–10.1)
CHLORIDE SERPL-SCNC: 111 MMOL/L (ref 100–111)
CO2 SERPL-SCNC: 24 MMOL/L (ref 21–32)
CREAT SERPL-MCNC: 0.94 MG/DL (ref 0.6–1.3)
DIFFERENTIAL METHOD BLD: ABNORMAL
EOSINOPHIL # BLD: 0.3 K/UL (ref 0–0.4)
EOSINOPHIL NFR BLD: 4 % (ref 0–5)
ERYTHROCYTE [DISTWIDTH] IN BLOOD BY AUTOMATED COUNT: 13.6 % (ref 11.6–14.5)
GLOBULIN SER CALC-MCNC: 3 G/DL (ref 2–4)
GLUCOSE SERPL-MCNC: 73 MG/DL (ref 74–99)
HCT VFR BLD AUTO: 50.7 % (ref 36–48)
HGB BLD-MCNC: 15.9 G/DL (ref 13–16)
LYMPHOCYTES # BLD: 2.2 K/UL (ref 0.9–3.6)
LYMPHOCYTES NFR BLD: 31 % (ref 21–52)
MCH RBC QN AUTO: 29.8 PG (ref 24–34)
MCHC RBC AUTO-ENTMCNC: 31.4 G/DL (ref 31–37)
MCV RBC AUTO: 94.9 FL (ref 74–97)
MONOCYTES # BLD: 0.4 K/UL (ref 0.05–1.2)
MONOCYTES NFR BLD: 6 % (ref 3–10)
NEUTS SEG # BLD: 4.2 K/UL (ref 1.8–8)
NEUTS SEG NFR BLD: 59 % (ref 40–73)
PLATELET # BLD AUTO: 214 K/UL (ref 135–420)
PMV BLD AUTO: 10.7 FL (ref 9.2–11.8)
POTASSIUM SERPL-SCNC: 4.2 MMOL/L (ref 3.5–5.5)
PROT SERPL-MCNC: 6.3 G/DL (ref 6.4–8.2)
RBC # BLD AUTO: 5.34 M/UL (ref 4.35–5.65)
SODIUM SERPL-SCNC: 144 MMOL/L (ref 136–145)
T4 FREE SERPL-MCNC: 1.3 NG/DL (ref 0.7–1.5)
TSH SERPL DL<=0.05 MIU/L-ACNC: 1.18 UIU/ML (ref 0.36–3.74)
WBC # BLD AUTO: 7.2 K/UL (ref 4.6–13.2)

## 2021-05-10 PROCEDURE — 86803 HEPATITIS C AB TEST: CPT

## 2021-05-10 PROCEDURE — 80053 COMPREHEN METABOLIC PANEL: CPT

## 2021-05-10 PROCEDURE — 36415 COLL VENOUS BLD VENIPUNCTURE: CPT

## 2021-05-10 PROCEDURE — 82306 VITAMIN D 25 HYDROXY: CPT

## 2021-05-10 PROCEDURE — 84439 ASSAY OF FREE THYROXINE: CPT

## 2021-05-10 PROCEDURE — 85025 COMPLETE CBC W/AUTO DIFF WBC: CPT

## 2021-05-11 LAB
HCV AB SER IA-ACNC: 0.13 INDEX
HCV AB SERPL QL IA: NEGATIVE
HCV COMMENT,HCGAC: NORMAL

## 2021-05-27 RX ORDER — ACETAMINOPHEN 500 MG
TABLET ORAL
Qty: 30 TABLET | Refills: 5 | Status: SHIPPED | OUTPATIENT
Start: 2021-05-27

## 2021-06-08 RX ORDER — MULTIVIT-MIN/FA/LYCOPEN/LUTEIN .4-300-25
TABLET ORAL
Qty: 30 TABLET | Refills: 5 | Status: SHIPPED | OUTPATIENT
Start: 2021-06-08

## 2021-06-29 RX ORDER — LANOLIN ALCOHOL/MO/W.PET/CERES
CREAM (GRAM) TOPICAL
Qty: 62 TABLET | Refills: 0 | Status: SHIPPED | OUTPATIENT
Start: 2021-06-29

## 2021-06-29 RX ORDER — LEVOTHYROXINE SODIUM 25 UG/1
TABLET ORAL
Qty: 31 TABLET | Refills: 0 | Status: SHIPPED | OUTPATIENT
Start: 2021-06-29 | End: 2022-08-12

## 2021-06-29 RX ORDER — CALCITONIN SALMON 200 [IU]/.09ML
SPRAY, METERED NASAL
Qty: 3.7 ML | Refills: 0 | Status: SHIPPED | OUTPATIENT
Start: 2021-06-29

## 2021-06-29 RX ORDER — ZONISAMIDE 100 MG/1
CAPSULE ORAL
Qty: 31 CAPSULE | Refills: 0 | Status: SHIPPED | OUTPATIENT
Start: 2021-06-29

## 2021-06-29 RX ORDER — PHENOLPHTHALEIN 90 MG
TABLET,CHEWABLE ORAL
Qty: 310 ML | Refills: 0 | Status: SHIPPED | OUTPATIENT
Start: 2021-06-29

## 2022-01-28 ENCOUNTER — HOSPITAL ENCOUNTER (OUTPATIENT)
Dept: LAB | Age: 52
Discharge: HOME | End: 2022-01-28
Payer: MEDICARE

## 2022-01-28 ENCOUNTER — OFFICE VISIT (OUTPATIENT)
Dept: FAMILY MEDICINE CLINIC | Age: 52
End: 2022-01-28
Payer: MEDICARE

## 2022-01-28 ENCOUNTER — APPOINTMENT (OUTPATIENT)
Dept: FAMILY MEDICINE CLINIC | Age: 52
End: 2022-01-28

## 2022-01-28 VITALS
TEMPERATURE: 97.5 F | RESPIRATION RATE: 18 BRPM | SYSTOLIC BLOOD PRESSURE: 108 MMHG | HEART RATE: 90 BPM | DIASTOLIC BLOOD PRESSURE: 60 MMHG

## 2022-01-28 DIAGNOSIS — E03.9 ACQUIRED HYPOTHYROIDISM: ICD-10-CM

## 2022-01-28 DIAGNOSIS — D69.6 THROMBOCYTOPENIA (HCC): ICD-10-CM

## 2022-01-28 DIAGNOSIS — G80.8 OTHER CEREBRAL PALSY (HCC): ICD-10-CM

## 2022-01-28 DIAGNOSIS — Z74.3 REQUIRES CONTINUOUS SUPERVISION FOR ACTIVITIES OF DAILY LIVING (ADL): ICD-10-CM

## 2022-01-28 DIAGNOSIS — R56.9 SEIZURES (HCC): ICD-10-CM

## 2022-01-28 DIAGNOSIS — E03.9 ACQUIRED HYPOTHYROIDISM: Primary | ICD-10-CM

## 2022-01-28 LAB
ANION GAP SERPL CALC-SCNC: 5 MMOL/L (ref 3–18)
BASOPHILS # BLD: 0 K/UL (ref 0–0.1)
BASOPHILS NFR BLD: 1 % (ref 0–2)
BUN SERPL-MCNC: 13 MG/DL (ref 7–18)
BUN/CREAT SERPL: 15 (ref 12–20)
CALCIUM SERPL-MCNC: 8.7 MG/DL (ref 8.5–10.1)
CHLORIDE SERPL-SCNC: 109 MMOL/L (ref 100–111)
CO2 SERPL-SCNC: 28 MMOL/L (ref 21–32)
CREAT SERPL-MCNC: 0.86 MG/DL (ref 0.6–1.3)
DIFFERENTIAL METHOD BLD: ABNORMAL
EOSINOPHIL # BLD: 0.2 K/UL (ref 0–0.4)
EOSINOPHIL NFR BLD: 4 % (ref 0–5)
ERYTHROCYTE [DISTWIDTH] IN BLOOD BY AUTOMATED COUNT: 13.3 % (ref 11.6–14.5)
GLUCOSE SERPL-MCNC: 87 MG/DL (ref 74–99)
HCT VFR BLD AUTO: 47 % (ref 36–48)
HGB BLD-MCNC: 15.2 G/DL (ref 13–16)
IMM GRANULOCYTES # BLD AUTO: 0 K/UL (ref 0–0.04)
IMM GRANULOCYTES NFR BLD AUTO: 0 % (ref 0–0.5)
LYMPHOCYTES # BLD: 1.6 K/UL (ref 0.9–3.6)
LYMPHOCYTES NFR BLD: 31 % (ref 21–52)
MCH RBC QN AUTO: 30.2 PG (ref 24–34)
MCHC RBC AUTO-ENTMCNC: 32.3 G/DL (ref 31–37)
MCV RBC AUTO: 93.4 FL (ref 78–100)
MONOCYTES # BLD: 0.4 K/UL (ref 0.05–1.2)
MONOCYTES NFR BLD: 8 % (ref 3–10)
NEUTS SEG # BLD: 3 K/UL (ref 1.8–8)
NEUTS SEG NFR BLD: 57 % (ref 40–73)
NRBC # BLD: 0 K/UL (ref 0–0.01)
NRBC BLD-RTO: 0 PER 100 WBC
PLATELET # BLD AUTO: 136 K/UL (ref 135–420)
PMV BLD AUTO: 12 FL (ref 9.2–11.8)
POTASSIUM SERPL-SCNC: 3.8 MMOL/L (ref 3.5–5.5)
RBC # BLD AUTO: 5.03 M/UL (ref 4.35–5.65)
SODIUM SERPL-SCNC: 142 MMOL/L (ref 136–145)
T4 FREE SERPL-MCNC: 1 NG/DL (ref 0.7–1.5)
TSH SERPL DL<=0.05 MIU/L-ACNC: 1.36 UIU/ML (ref 0.36–3.74)
WBC # BLD AUTO: 5.3 K/UL (ref 4.6–13.2)

## 2022-01-28 PROCEDURE — G8432 DEP SCR NOT DOC, RNG: HCPCS | Performed by: LEGAL MEDICINE

## 2022-01-28 PROCEDURE — 3017F COLORECTAL CA SCREEN DOC REV: CPT | Performed by: LEGAL MEDICINE

## 2022-01-28 PROCEDURE — 36415 COLL VENOUS BLD VENIPUNCTURE: CPT

## 2022-01-28 PROCEDURE — 99214 OFFICE O/P EST MOD 30 MIN: CPT | Performed by: LEGAL MEDICINE

## 2022-01-28 PROCEDURE — G8427 DOCREV CUR MEDS BY ELIG CLIN: HCPCS | Performed by: LEGAL MEDICINE

## 2022-01-28 PROCEDURE — 84439 ASSAY OF FREE THYROXINE: CPT

## 2022-01-28 PROCEDURE — 85025 COMPLETE CBC W/AUTO DIFF WBC: CPT

## 2022-01-28 PROCEDURE — G8419 CALC BMI OUT NRM PARAM NOF/U: HCPCS | Performed by: LEGAL MEDICINE

## 2022-01-28 PROCEDURE — 80048 BASIC METABOLIC PNL TOTAL CA: CPT

## 2022-01-28 NOTE — PROGRESS NOTES
Sravanthi Reyes is a 46 y.o. male (: 1970) presenting to address:    Chief Complaint   Patient presents with    Medication Evaluation     6 month f/u       Vitals:    22 1128   BP: 108/60   Pulse: 90   Resp: 18   Temp: 97.5 °F (36.4 °C)   TempSrc: Temporal   PainSc:   0 - No pain       Hearing/Vision:   No exam data present    Learning Assessment:   No flowsheet data found. Depression Screening:     3 most recent PHQ Screens 2021   PHQ Not Done -   Little interest or pleasure in doing things Not at all   Feeling down, depressed, irritable, or hopeless Not at all   Total Score PHQ 2 0     Fall Risk Assessment:     Fall Risk Assessment, last 12 mths 2021   Able to walk? No     Abuse Screening:     Abuse Screening Questionnaire 2021   Do you ever feel afraid of your partner? N   Are you in a relationship with someone who physically or mentally threatens you? N   Is it safe for you to go home? Y     ADL Assessment:     ADL Assessment 2021   Feeding yourself Help Needed   Getting from bed to chair Help Needed   Getting dressed Help Needed   Bathing or showering Help Needed   Walk across the room (includes cane/walker) Help Needed   Using the telphone Help Needed   Taking your medications Help Needed   Preparing meals Help Needed   Managing money (expenses/bills) Help Needed   Moderately strenuous housework (laundry) Help Needed   Shopping for personal items (toiletries/medicines) Help Needed   Shopping for groceries Help Needed   Driving Help Needed   Climbing a flight of stairs Help Needed   Getting to places beyond walking distances Help Needed        Coordination of Care Questionaire:   1. \"Have you been to the ER, urgent care clinic since your last visit? Hospitalized since your last visit? \" No    2. \"Have you seen or consulted any other health care providers outside of the [address] [place] [address] Shahzad since your last visit? \" No     3.  For patients aged 39-70: Has the patient had a colonoscopy? No     If the patient is female:    4. For patients aged 41-77: Has the patient had a mammogram within the past 2 years? NA - based on age    11. For patients aged 21-65: Has the patient had a pap smear? NA - based on age    Advanced Directive:   1. Do you have an Advanced Directive? NO    2. Would you like information on Advanced Directives?  NO

## 2022-01-28 NOTE — PROGRESS NOTES
Megha Richard     Chief Complaint   Patient presents with    Medication Evaluation     6 month f/u     Vitals:    01/28/22 1128   BP: 108/60   Pulse: 90   Resp: 18   Temp: 97.5 °F (36.4 °C)   TempSrc: Temporal   PainSc:   0 - No pain         HPI:Bill Spann Mc is a 48 y.o.  male and presents for 6 months follow-up     He is here accompanied by a nurse Alvaro  who knows him for over 1 year he resolved at the group home [address] [place] [address]   He in non verbal he is a wheelchair-bound  On seizure medication and seeing neurology   Has G tube for medication and water flush   Otherwise he eats soft puree diet by neurologist  Current weight I 113.5 lb  this morning at the facility   He wears adult diapers   No acute complaints today no concerns as per caregiver      He had 3 doses of Covid 19 vaccine  Also he received flu vaccine  Discussed with his caregiver that he need Tdap and shingles to get them at the pharmacy      Past Medical History:   Diagnosis Date    Cere[facility] ([facility]     Scoliosis     Seizures ([facility]     Thyroid disease      Past Surgical History:   Procedure Laterality Date    HX ORTHOPAEDIC      foot surgery    NV ABDOMEN SURGERY PROC UNLISTED      g-tube     Social History     Tobacco Use    Smoking status: Never Smoker    Smokeless tobacco: Never Used   Substance Use Topics    Alcohol use: No       Family History   Problem Relation Age of Onset    Diabetes Sister        Review of Systems   Constitutional: Negative for chills, fever, malaise/fatigue and weight loss. HENT: Negative for ear discharge and nosebleeds. Eyes: Negative for discharge. Respiratory: Negative for cough, sputum production and shortness of breath. Cardiovascular: Negative for leg swelling. Gastrointestinal: Negative for blood in stool, constipation, diarrhea, melena and vomiting. Genitourinary: Negative for dysuria, frequency and urgency. Skin: Negative for itching and rash.    Neurological: Positive for focal weakness and weakness. Endo/Heme/Allergies: Does not bruise/bleed easily. Psychiatric/Behavioral: The patient does not have insomnia. Physical Exam  Vitals and nursing note reviewed. Exam conducted with a chaperone present. Constitutional:       General: He is not in acute distress. Appearance: He is not ill-appearing, toxic-appearing or diaphoretic. Pulmonary:      Effort: Pulmonary effort is normal. No respiratory distress. Breath sounds: Normal breath sounds. No wheezing or rhonchi. Abdominal:      General: Abdomen is flat. There is no distension. Musculoskeletal:      Cervical back: Neck supple. Right lower leg: Edema present. Left lower leg: No edema. Skin:     General: Skin is warm and dry. Findings: No erythema or rash. Neurological:      Mental Status: He is alert. Mental status is at baseline. Comments: He is on wheelchair   Psychiatric:      Comments: He is nonverbal has been sleepy all the visit no aggressive behaviors          Assessment and plan     Plan of care has been discussed with the patient, he agrees to the plan and verbalized understanding. All his questions were answered  More than 50% of the time spent in this visit was counseling the patient about  illness and treatment options         1. Acquired hypothyroidism  Medication is given to him regularly  Last thyroid function 6 months ago was normal  - TSH AND FREE T4; Future  - METABOLIC PANEL, BASIC; Future    2. Thrombocytopenia (HCC)  Last CBC was normal recheck platelets count  - CBC WITH AUTOMATED DIFF; Future  - METABOLIC PANEL, BASIC; Future    3. Other cerebral palsy ([facility]    - METABOLIC PANEL, BASIC; Future    4. Seizures ([facility]  Stable on current medication he is following up with neurologist  - METABOLIC PANEL, BASIC; Future    5.  Requires continuous supervision for activities of daily living (ADL)      Current Outpatient Medications   Medication Sig Dispense Refill    zonisamide (ZONEGRAN) 100 mg capsule OPEN AND DISSOLVE 6 CAPSULES IN WATER AND ADMINSTER VIA G-TUBE 31 Capsule 0    levothyroxine (SYNTHROID) 25 mcg tablet TAKE 1 TAB VIA G-TUBE ONCE DAILY FOR HYPOTHYROIDISM - CRUSH AND DISSOLVE IN H2O 31 Tablet 0    calcitonin, salmon, (MIACALCIN) nasal USE 1 SPRAY IN ONE NOSTRIL DAILY FOR OSTEOPOROSIS - ALTERNATE NOSTRILS DAILY - **REFRIGERATE UNTIL OPENED;THEN STORE AT ROOM TEMP.;DISCARD 3 3.7 mL 0    Oyster Shell Calcium-Vit D3 500 mg(1,250mg) -200 unit per tablet TAKE 1 TAB VIA G-TUBE TWICE DAILY FOR NUTRITIONAL SUPPLEMENT - CRUSH AND DISSOLVE IN H2O 62 Tablet 0    loratadine (CLARITIN) 5 mg/5 mL syrup TAKE 10ML BY MOUTH DAILY FOR ALLERGIES 310 mL 0    multivit-min-FA-lycopen-lutein (CertaVite Senior) 8.8-536-846 mg-mcg-mcg tab CRUSH 1 TABLET AND GIVE VIA G-TUBE ONCE DAILY 30 Tablet 5    acetaminophen (TYLENOL) 500 mg tablet TAKE 1 TAB PER G-TUBE EVERY 4 HOURS AS NEEDED FOR MINOR PAIN/FEVER > 100 - NTE 6 TABS/24 HRS. 30 Tablet 5    diazePAM (VALIUM) 10 mg tablet Take 1 hour before eye exam Crushed and administered through G-tube 1 Tab 3    Artificial Tears, polyvin alc, 1.4 % ophthalmic solution INSTILL 1 DROP INTO BOTH EYES TWICE DAILY FOR CONTINUOUS DRY EYE. OK TO PLACE IN FRIDGE AND PUT IN COLD.  15 mL 0    folic acid-vit L6-MSZ L37 (Folbic) 2.5-25-2 mg tablet TAKE 1 TAB VIA G-TUBE 2 TIMES DAILY FOR FOLATE DEFICIENCY - CRUSH ANDDISSOLVE IN H2O (2/2.5/25) 62 Tab 0    baclofen (LIORESAL) 20 mg tablet TAKE 1 TAB VIA G-TUBE 3 TIMES DAILY FOR SPASTICITY - CRUSH AND DISSOLVE IN H2O 90 Tab 5    eyelid cleanser combination 3 (OcuSoft Lid Scrub Plus) padm USE 1 PAD TO SCRUB BOTH EYELIDS ONCE A DAY 30 Each 5    sorbitoL 70 % solution TAKE 15ML VIA G-TUBE IN THE MORNING TO MAINTAIN BOWEL REGULARITY 465 mL 10    menthol-zinc oxide (Calmoseptine) 0.44-20.6 % oint APPLY TOPICALLY TO G-TUBE SITE DAILY TO PREVENT OVERGROWTH AND IRRITATION AT SITE 113 g 2    clonazePAM (KlonoPIN) 0.5 mg TbDi disintegrating tablet Take 0.5 mg by mouth once as needed. For seizure >3min; if persists after rx, call 911      glycerin-mineral oil-lanolin (EUCERIN PLUS) topical cream Apply  to affected area as needed for Dry Skin. 226 g 5    OTHER       nystatin (MYCOSTATIN) powder Apply to affected area twice daily with diaper change 60 g 3    Retapamulin (ALTABAX) 1 % ointment Apply  to affected area two (2) times a day. 30 g 0    mupirocin calcium (BACTROBAN) 2 % topical cream Apply  to affected area two (2) times a day. 15 g 0    MULTIVITAMIN,THERAPEUTIC (THERAVITE PO) Take  by mouth.  lacosamide (VIMPAT) 200 mg tab tablet Take 200 mg by mouth two (2) times a day.  EUCALYP/ME-SALICYLATE/MEN/THYM (LISTERINE MM) by Mucous Membrane route.  BABY OIL, MINERAL OIL, EX by Apply Externally route.  ibuprofen (MOTRIN) 600 mg tablet 1 Tab by Per G Tube route every eight (8) hours as needed for Pain. 90 Tab 0    diclofenac (VOLTAREN) 1 % gel Apply  to affected area four (4) times daily.  500 g 0    OTHER ACO as needed 1 Each 0    OTHER Stander for spine alignment  Strap for toe straightening 1 Each 0       Patient Active Problem List    Diagnosis Date Noted    Requires continuous supervision for activities of daily living (ADL) 05/20/2020    Scoliosis deformity of spine 02/14/2019    Other [facility] palsy ([facility] 01/17/2018    Seizures ([facility] 01/17/2018    Acquired hypothyroidism 01/17/2018    Thrombocytopenia ([facility] 02/18/2012     Results for orders placed or performed during the hospital encounter of 05/10/21   CBC WITH AUTOMATED DIFF   Result Value Ref Range    WBC 7.2 4.6 - 13.2 K/uL    RBC 5.34 4.35 - 5.65 M/uL    HGB 15.9 13.0 - 16.0 g/dL    HCT 50.7 (H) 36.0 - 48.0 %    MCV 94.9 74.0 - 97.0 FL    MCH 29.8 24.0 - 34.0 PG    MCHC 31.4 31.0 - 37.0 g/dL    RDW 13.6 11.6 - 14.5 %    PLATELET 282 942 - 459 K/uL    MPV 10.7 9.2 - 11.8 FL    NEUTROPHILS 59 40 - 73 % LYMPHOCYTES 31 21 - 52 %    MONOCYTES 6 3 - 10 %    EOSINOPHILS 4 0 - 5 %    BASOPHILS 0 0 - 2 %    ABS. NEUTROPHILS 4.2 1.8 - 8.0 K/UL    ABS. LYMPHOCYTES 2.2 0.9 - 3.6 K/UL    ABS. MONOCYTES 0.4 0.05 - 1.2 K/UL    ABS. EOSINOPHILS 0.3 0.0 - 0.4 K/UL    ABS. BASOPHILS 0.0 0.0 - 0.1 K/UL    DF AUTOMATED     HEPATITIS C AB   Result Value Ref Range    Hepatitis C virus Ab 0.13 <0.80 Index    Hep C virus Ab Interp. Negative NEG      Hep C  virus Ab comment         METABOLIC PANEL, COMPREHENSIVE   Result Value Ref Range    Sodium 144 136 - 145 mmol/L    Potassium 4.2 3.5 - 5.5 mmol/L    Chloride 111 100 - 111 mmol/L    CO2 24 21 - 32 mmol/L    Anion gap 9 3.0 - 18 mmol/L    Glucose 73 (L) 74 - 99 mg/dL    BUN 13 7.0 - 18 MG/DL    Creatinine 0.94 0.6 - 1.3 MG/DL    BUN/Creatinine ratio 14 12 - 20      GFR est AA >60 >60 ml/min/1.73m2    GFR est non-AA >60 >60 ml/min/1.73m2    Calcium 8.8 8.5 - 10.1 MG/DL    Bilirubin, total 0.2 0.2 - 1.0 MG/DL    ALT (SGPT) 37 16 - 61 U/L    AST (SGOT) 20 10 - 38 U/L    Alk. phosphatase 96 45 - 117 U/L    Protein, total 6.3 (L) 6.4 - 8.2 g/dL    Albumin 3.3 (L) 3.4 - 5.0 g/dL    Globulin 3.0 2.0 - 4.0 g/dL    A-G Ratio 1.1 0.8 - 1.7     TSH AND FREE T4   Result Value Ref Range    TSH 1.18 0.36 - 3.74 uIU/mL    T4, Free 1.3 0.7 - 1.5 NG/DL   VITAMIN D, 25 HYDROXY   Result Value Ref Range    Vitamin D 25-Hydroxy 41.7 30 - 100 ng/mL     No visits with results within 3 Month(s) from this visit.    Latest known visit with results is:   Hospital Outpatient Visit on 05/10/2021   Component Date Value Ref Range Status    WBC 05/10/2021 7.2  4.6 - 13.2 K/uL Final    RBC 05/10/2021 5.34  4.35 - 5.65 M/uL Final    HGB 05/10/2021 15.9  13.0 - 16.0 g/dL Final    HCT 05/10/2021 50.7* 36.0 - 48.0 % Final    MCV 05/10/2021 94.9  74.0 - 97.0 FL Final    MCH 05/10/2021 29.8  24.0 - 34.0 PG Final    MCHC 05/10/2021 31.4  31.0 - 37.0 g/dL Final    RDW 05/10/2021 13.6  11.6 - 14.5 % Final    PLATELET 05/10/2021 214  135 - 420 K/uL Final    MPV 05/10/2021 10.7  9.2 - 11.8 FL Final    NEUTROPHILS 05/10/2021 59  40 - 73 % Final    LYMPHOCYTES 05/10/2021 31  21 - 52 % Final    MONOCYTES 05/10/2021 6  3 - 10 % Final    EOSINOPHILS 05/10/2021 4  0 - 5 % Final    BASOPHILS 05/10/2021 0  0 - 2 % Final    ABS. NEUTROPHILS 05/10/2021 4.2  1.8 - 8.0 K/UL Final    ABS. LYMPHOCYTES 05/10/2021 2.2  0.9 - 3.6 K/UL Final    ABS. MONOCYTES 05/10/2021 0.4  0.05 - 1.2 K/UL Final    ABS. EOSINOPHILS 05/10/2021 0.3  0.0 - 0.4 K/UL Final    ABS. BASOPHILS 05/10/2021 0.0  0.0 - 0.1 K/UL Final    DF 05/10/2021 AUTOMATED    Final    Hepatitis C virus Ab 05/10/2021 0.13  <0.80 Index Final    Hep C virus Ab Interp. 05/10/2021 Negative  NEG   Final    Hep C  virus Ab comment 05/10/2021        Final    Comment: Index <0.80. ......................... Mayra Stands Negative  Index > or = to 0.80 and <1.00. .... Mayra Stands Mayra Stands Equivocal  Index >1.00. ......................... Mayra Stands Positive          For Equivocal or Positive results, confirmation with Hepatitis C RNA by PCR or bDNA is suggested.  Sodium 05/10/2021 144  136 - 145 mmol/L Final    Potassium 05/10/2021 4.2  3.5 - 5.5 mmol/L Final    Chloride 05/10/2021 111  100 - 111 mmol/L Final    CO2 05/10/2021 24  21 - 32 mmol/L Final    Anion gap 05/10/2021 9  3.0 - 18 mmol/L Final    Glucose 05/10/2021 73* 74 - 99 mg/dL Final    BUN 05/10/2021 13  7.0 - 18 MG/DL Final    Creatinine 05/10/2021 0.94  0.6 - 1.3 MG/DL Final    BUN/Creatinine ratio 05/10/2021 14  12 - 20   Final    GFR est AA 05/10/2021 >60  >60 ml/min/1.73m2 Final    GFR est non-AA 05/10/2021 >60  >60 ml/min/1.73m2 Final    Comment: (NOTE)  Estimated GFR is calculated using the Modification of Diet in Renal   Disease (MDRD) Study equation, reported for both  Americans   (GFRAA) and non- Americans (GFRNA), and normalized to 1.73m2   body surface area. The physician must decide which value applies to   the patient.  The MDRD study equation should only be used in   individuals age 25 or older. It has not been validated for the   following: pregnant women, patients with serious comorbid conditions,   or on certain medications, or persons with extremes of body size,   muscle mass, or nutritional status.  Calcium 05/10/2021 8.8  8.5 - 10.1 MG/DL Final    Bilirubin, total 05/10/2021 0.2  0.2 - 1.0 MG/DL Final    ALT (SGPT) 05/10/2021 37  16 - 61 U/L Final    AST (SGOT) 05/10/2021 20  10 - 38 U/L Final    Alk. phosphatase 05/10/2021 96  45 - 117 U/L Final    Protein, total 05/10/2021 6.3* 6.4 - 8.2 g/dL Final    Albumin 05/10/2021 3.3* 3.4 - 5.0 g/dL Final    Globulin 05/10/2021 3.0  2.0 - 4.0 g/dL Final    A-G Ratio 05/10/2021 1.1  0.8 - 1.7   Final    TSH 05/10/2021 1.18  0.36 - 3.74 uIU/mL Final    T4, Free 05/10/2021 1.3  0.7 - 1.5 NG/DL Final    Vitamin D 25-Hydroxy 05/10/2021 41.7  30 - 100 ng/mL Final    Comment: (NOTE)  Deficiency               <20 ng/mL  Insufficiency          20-30 ng/mL  Sufficient             ng/mL  Possible toxicity       >100 ng/mL    The Method used is Siemens Advia Centaur currently standardized to a   Center of Disease Control and Prevention (CDC) certified reference   [facility]. Samples containing fluorescein dye can produce falsely   elevated values when tested with the ADVIA Centaur Vitamin D Assay. It is recommended that results in the toxic range, >100 ng/mL, be   retested 72 hours post fluorescein exposure. Follow-up and Dispositions    · Return in about 6 months (around 7/28/2022) for for medicare wellness.

## 2022-03-11 ENCOUNTER — TELEPHONE (OUTPATIENT)
Dept: FAMILY MEDICINE CLINIC | Age: 52
End: 2022-03-11

## 2022-03-11 DIAGNOSIS — D69.6 THROMBOCYTOPENIA (HCC): ICD-10-CM

## 2022-03-11 DIAGNOSIS — Z00.00 MEDICARE ANNUAL WELLNESS VISIT, SUBSEQUENT: Primary | ICD-10-CM

## 2022-03-11 DIAGNOSIS — Z13.220 LIPID SCREENING: ICD-10-CM

## 2022-03-11 DIAGNOSIS — E55.9 VITAMIN D DEFICIENCY: ICD-10-CM

## 2022-03-11 DIAGNOSIS — E03.9 ACQUIRED HYPOTHYROIDISM: ICD-10-CM

## 2022-03-11 NOTE — TELEPHONE ENCOUNTER
Pt has an upcoming appts for his [address] and lab. Please place orders if appropriate.      Future Appointments   Date Time Provider Emigdio Elaine   7/19/2022 10:40 AM LAB[facility] AMB   7/19/2022 11:00 AM Fifi Cabrera MD [facility] AMB

## 2022-03-18 PROBLEM — Z74.3 REQUIRES CONTINUOUS SUPERVISION FOR ACTIVITIES OF DAILY LIVING (ADL): Status: ACTIVE | Noted: 2020-05-20

## 2022-03-18 PROBLEM — M41.9 SCOLIOSIS DEFORMITY OF SPINE: Status: ACTIVE | Noted: 2019-02-14

## 2022-03-19 PROBLEM — G80.8 OTHER CEREBRAL PALSY (HCC): Status: ACTIVE | Noted: 2018-01-17

## 2022-03-19 PROBLEM — E03.9 ACQUIRED HYPOTHYROIDISM: Status: ACTIVE | Noted: 2018-01-17

## 2022-03-20 PROBLEM — R56.9 SEIZURES (HCC): Status: ACTIVE | Noted: 2018-01-17

## 2022-06-28 DIAGNOSIS — G80.8 OTHER CEREBRAL PALSY (HCC): ICD-10-CM

## 2022-06-28 RX ORDER — DIAZEPAM 10 MG/1
TABLET ORAL
Qty: 1 TABLET | Refills: 2 | Status: SHIPPED | OUTPATIENT
Start: 2022-06-28 | End: 2022-06-29 | Stop reason: SDUPTHER

## 2022-06-29 DIAGNOSIS — G80.8 OTHER CEREBRAL PALSY (HCC): ICD-10-CM

## 2022-06-29 RX ORDER — DIAZEPAM 10 MG/1
TABLET ORAL
Qty: 1 TABLET | Refills: 2 | Status: SHIPPED | OUTPATIENT
Start: 2022-06-29

## 2022-06-29 NOTE — TELEPHONE ENCOUNTER
Meera Werner from [facility] called on behalf of the pt. Meera Werner stated that the pt has to take medication soon for his eye exam and the the [facility] will not be able to get the medication to the pt on time. Meera Werner is requesting that the medication be sent to the updated pharmacy so that it could be picked up and taken to where the pt is. Meera Werner stated that since it is a controlled substance they are unable to transfer the medication.  Please advise

## 2022-06-29 NOTE — TELEPHONE ENCOUNTER
Shelia from [facility] came in office to inform PCP that pt has an eye exam on tomorrow and medication Diazepam will not be delivered in time for his appt. Care giver would like to know if PCP could send medication to local pharmacy.

## 2022-07-19 ENCOUNTER — APPOINTMENT (OUTPATIENT)
Dept: FAMILY MEDICINE CLINIC | Age: 52
End: 2022-07-19

## 2022-07-19 ENCOUNTER — HOSPITAL ENCOUNTER (OUTPATIENT)
Dept: LAB | Age: 52
Discharge: HOME | End: 2022-07-19
Payer: MEDICARE

## 2022-07-19 ENCOUNTER — OFFICE VISIT (OUTPATIENT)
Dept: FAMILY MEDICINE CLINIC | Age: 52
End: 2022-07-19
Payer: MEDICARE

## 2022-07-19 VITALS
DIASTOLIC BLOOD PRESSURE: 70 MMHG | RESPIRATION RATE: 16 BRPM | HEART RATE: 102 BPM | HEIGHT: 67 IN | TEMPERATURE: 98.7 F | OXYGEN SATURATION: 96 % | SYSTOLIC BLOOD PRESSURE: 114 MMHG | BODY MASS INDEX: 17.23 KG/M2

## 2022-07-19 DIAGNOSIS — R56.9 SEIZURES (HCC): ICD-10-CM

## 2022-07-19 DIAGNOSIS — Z00.00 MEDICARE ANNUAL WELLNESS VISIT, SUBSEQUENT: Primary | ICD-10-CM

## 2022-07-19 DIAGNOSIS — Z12.5 SCREENING FOR PROSTATE CANCER: ICD-10-CM

## 2022-07-19 DIAGNOSIS — E55.9 VITAMIN D DEFICIENCY: ICD-10-CM

## 2022-07-19 DIAGNOSIS — G80.8 OTHER CEREBRAL PALSY (HCC): ICD-10-CM

## 2022-07-19 DIAGNOSIS — M85.80 OSTEOPENIA, UNSPECIFIED LOCATION: ICD-10-CM

## 2022-07-19 DIAGNOSIS — E03.9 ACQUIRED HYPOTHYROIDISM: ICD-10-CM

## 2022-07-19 DIAGNOSIS — D69.6 THROMBOCYTOPENIA (HCC): ICD-10-CM

## 2022-07-19 DIAGNOSIS — Z74.3 REQUIRES CONTINUOUS SUPERVISION FOR ACTIVITIES OF DAILY LIVING (ADL): ICD-10-CM

## 2022-07-19 DIAGNOSIS — Z13.220 LIPID SCREENING: ICD-10-CM

## 2022-07-19 DIAGNOSIS — Z00.00 MEDICARE ANNUAL WELLNESS VISIT, SUBSEQUENT: ICD-10-CM

## 2022-07-19 LAB
ALBUMIN SERPL-MCNC: 3.4 G/DL (ref 3.4–5)
ALBUMIN/GLOB SERPL: 1.1 {RATIO} (ref 0.8–1.7)
ALP SERPL-CCNC: 84 U/L (ref 45–117)
ALT SERPL-CCNC: 26 U/L (ref 16–61)
ANION GAP SERPL CALC-SCNC: 8 MMOL/L (ref 3–18)
AST SERPL-CCNC: 15 U/L (ref 10–38)
BASOPHILS # BLD: 0 K/UL (ref 0–0.1)
BASOPHILS NFR BLD: 1 % (ref 0–2)
BILIRUB SERPL-MCNC: 0.3 MG/DL (ref 0.2–1)
BUN SERPL-MCNC: 11 MG/DL (ref 7–18)
BUN/CREAT SERPL: 15 (ref 12–20)
CALCIUM SERPL-MCNC: 8.6 MG/DL (ref 8.5–10.1)
CHLORIDE SERPL-SCNC: 112 MMOL/L (ref 100–111)
CHOLEST SERPL-MCNC: 128 MG/DL
CO2 SERPL-SCNC: 24 MMOL/L (ref 21–32)
CREAT SERPL-MCNC: 0.75 MG/DL (ref 0.6–1.3)
DIFFERENTIAL METHOD BLD: ABNORMAL
EOSINOPHIL # BLD: 0.2 K/UL (ref 0–0.4)
EOSINOPHIL NFR BLD: 4 % (ref 0–5)
ERYTHROCYTE [DISTWIDTH] IN BLOOD BY AUTOMATED COUNT: 13.3 % (ref 11.6–14.5)
GLOBULIN SER CALC-MCNC: 3.2 G/DL (ref 2–4)
GLUCOSE SERPL-MCNC: 96 MG/DL (ref 74–99)
HCT VFR BLD AUTO: 45.1 % (ref 36–48)
HDLC SERPL-MCNC: 54 MG/DL (ref 40–60)
HDLC SERPL: 2.4 {RATIO} (ref 0–5)
HGB BLD-MCNC: 14.8 G/DL (ref 13–16)
IMM GRANULOCYTES # BLD AUTO: 0 K/UL (ref 0–0.04)
IMM GRANULOCYTES NFR BLD AUTO: 0 % (ref 0–0.5)
LDLC SERPL CALC-MCNC: 63.2 MG/DL (ref 0–100)
LIPID PROFILE,FLP: NORMAL
LYMPHOCYTES # BLD: 1.4 K/UL (ref 0.9–3.6)
LYMPHOCYTES NFR BLD: 24 % (ref 21–52)
MCH RBC QN AUTO: 30.7 PG (ref 24–34)
MCHC RBC AUTO-ENTMCNC: 32.8 G/DL (ref 31–37)
MCV RBC AUTO: 93.6 FL (ref 78–100)
MONOCYTES # BLD: 0.4 K/UL (ref 0.05–1.2)
MONOCYTES NFR BLD: 7 % (ref 3–10)
NEUTS SEG # BLD: 3.7 K/UL (ref 1.8–8)
NEUTS SEG NFR BLD: 64 % (ref 40–73)
NRBC # BLD: 0 K/UL (ref 0–0.01)
NRBC BLD-RTO: 0 PER 100 WBC
PLATELET # BLD AUTO: 138 K/UL (ref 135–420)
PMV BLD AUTO: 12.8 FL (ref 9.2–11.8)
POTASSIUM SERPL-SCNC: 3.6 MMOL/L (ref 3.5–5.5)
PROT SERPL-MCNC: 6.6 G/DL (ref 6.4–8.2)
PSA SERPL-MCNC: 3 NG/ML (ref 0–4)
RBC # BLD AUTO: 4.82 M/UL (ref 4.35–5.65)
SODIUM SERPL-SCNC: 144 MMOL/L (ref 136–145)
T4 FREE SERPL-MCNC: 1.1 NG/DL (ref 0.7–1.5)
TRIGL SERPL-MCNC: 54 MG/DL (ref ?–150)
TSH SERPL DL<=0.05 MIU/L-ACNC: 1.07 UIU/ML (ref 0.36–3.74)
VLDLC SERPL CALC-MCNC: 10.8 MG/DL
WBC # BLD AUTO: 5.8 K/UL (ref 4.6–13.2)

## 2022-07-19 PROCEDURE — 36415 COLL VENOUS BLD VENIPUNCTURE: CPT

## 2022-07-19 PROCEDURE — G8419 CALC BMI OUT NRM PARAM NOF/U: HCPCS | Performed by: LEGAL MEDICINE

## 2022-07-19 PROCEDURE — G0439 PPPS, SUBSEQ VISIT: HCPCS | Performed by: LEGAL MEDICINE

## 2022-07-19 PROCEDURE — 85025 COMPLETE CBC W/AUTO DIFF WBC: CPT

## 2022-07-19 PROCEDURE — G8432 DEP SCR NOT DOC, RNG: HCPCS | Performed by: LEGAL MEDICINE

## 2022-07-19 PROCEDURE — 80053 COMPREHEN METABOLIC PANEL: CPT

## 2022-07-19 PROCEDURE — 84439 ASSAY OF FREE THYROXINE: CPT

## 2022-07-19 PROCEDURE — G8427 DOCREV CUR MEDS BY ELIG CLIN: HCPCS | Performed by: LEGAL MEDICINE

## 2022-07-19 PROCEDURE — 80061 LIPID PANEL: CPT

## 2022-07-19 PROCEDURE — 3017F COLORECTAL CA SCREEN DOC REV: CPT | Performed by: LEGAL MEDICINE

## 2022-07-19 PROCEDURE — 84153 ASSAY OF PSA TOTAL: CPT

## 2022-07-19 NOTE — PROGRESS NOTES
Codie Camacho is a 46 y.o. male (: 1970) presenting to address:    Chief Complaint   Patient presents with    Annual Wellness Visit       Vitals:    22 1039   BP: 114/70   Pulse: (!) 102   Resp: 16   Temp: 98.7 °F (37.1 °C)   TempSrc: Temporal   SpO2: 96%   Height: 5' 7\" (1.702 m)       Hearing/Vision:   No exam data present    Learning Assessment:   No flowsheet data found. Depression Screening:     3 most recent PHQ Screens 2022   PHQ Not Done (No Data)   Little interest or pleasure in doing things -   Feeling down, depressed, irritable, or hopeless -   Total Score PHQ 2 -     Fall Risk Assessment:     Fall Risk Assessment, last 12 mths 2022   Able to walk? No     Abuse Screening:     Abuse Screening Questionnaire 2022   Do you ever feel afraid of your partner? N   Are you in a relationship with someone who physically or mentally threatens you? N   Is it safe for you to go home? Y     ADL Assessment:     ADL Assessment 2021   Feeding yourself Help Needed   Getting from bed to chair Help Needed   Getting dressed Help Needed   Bathing or showering Help Needed   Walk across the room (includes cane/walker) Help Needed   Using the telphone Help Needed   Taking your medications Help Needed   Preparing meals Help Needed   Managing money (expenses/bills) Help Needed   Moderately strenuous housework (laundry) Help Needed   Shopping for personal items (toiletries/medicines) Help Needed   Shopping for groceries Help Needed   Driving Help Needed   Climbing a flight of stairs Help Needed   Getting to places beyond walking distances Help Needed        Coordination of Care Questionaire:   1. \"Have you been to the ER, urgent care clinic since your last visit? Hospitalized since your last visit? \" No    2. \"Have you seen or consulted any other health care providers outside of the [address] [place] [address] Shahzad since your last visit? \" No     3.  For patients aged 39-70: Has the patient had a colonoscopy / FIT/ Cologuard? NA - based on age    If the patient is female:    4. For patients aged 41-77: Has the patient had a mammogram within the past 2 years? NA - based on age or sex  See top three    5. For patients aged 21-65: Has the patient had a pap smear? NA - based on age or sex    Advanced Directive:   1. Do you have an Advanced Directive? NO    2. Would you like information on Advanced Directives?  NO

## 2022-07-19 NOTE — PROGRESS NOTES
(AWV) The Initial Medicare Annual Wellness Exam PROGRESS NOTE    This is an Initial Medicare Annual Wellness Exam (AWV) (Performed 12 months after IPPE or effective date of Medicare Part B enrollment, Once in a lifetime)    I have reviewed the patient's medical history in detail and updated the computerized patient record. Blaine Smith is a 46 y.o.   male and presents for an annual wellness exam   wheel chair non verbal      He is here accompanied by a nurse who knows him for over 5 year at the group home her name Shelia , review of system has been taken by the help of his caregiver  Patient has been sleeping on the wheelchair for the whole time         Patient Active Problem List   Diagnosis Code    Other cerebral palsy ([address] G80.8    Seizures ([address] R56.9    Acquired hypothyroidism E03.9    Thrombocytopenia ([address] D69.6    Scoliosis deformity of spine M41.9    Requires continuous supervision for activities of daily living (ADL) Z74.3     Patient Active Problem List    Diagnosis Date Noted    Requires continuous supervision for activities of daily living (ADL) 05/20/2020    Scoliosis deformity of spine 02/14/2019    Other cerebral palsy ([address] 01/17/2018    Seizures ([address] 01/17/2018    Acquired hypothyroidism 01/17/2018    Thrombocytopenia ([address] 02/18/2012     Current Outpatient Medications   Medication Sig Dispense Refill    diazePAM (VALIUM) 10 mg tablet CRUSH 1 TABLET, MIX W/ WATER, AND GIVE VIA- G-TUBE 1 HOUR PRIOR TO EYE EXAM FOR ANXIETY 1 Tablet 2    zonisamide (ZONEGRAN) 100 mg capsule OPEN AND DISSOLVE 6 CAPSULES IN WATER AND ADMINSTER VIA G-TUBE 31 Capsule 0    levothyroxine (SYNTHROID) 25 mcg tablet TAKE 1 TAB VIA G-TUBE ONCE DAILY FOR HYPOTHYROIDISM - CRUSH AND DISSOLVE IN H2O 31 Tablet 0    calcitonin, salmon, (MIACALCIN) nasal USE 1 SPRAY IN ONE NOSTRIL DAILY FOR OSTEOPOROSIS - ALTERNATE NOSTRILS DAILY - **REFRIGERATE UNTIL OPENED;THEN STORE AT ROOM TEMP.;DISCARD 3 3.7 mL 0  Oyster Shell Calcium-Vit D3 500 mg(1,250mg) -200 unit per tablet TAKE 1 TAB VIA G-TUBE TWICE DAILY FOR NUTRITIONAL SUPPLEMENT - CRUSH AND DISSOLVE IN H2O 62 Tablet 0    loratadine (CLARITIN) 5 mg/5 mL syrup TAKE 10ML BY MOUTH DAILY FOR ALLERGIES 310 mL 0    multivit-min-FA-lycopen-lutein (CertaVite Senior) 4.7-751-196 mg-mcg-mcg tab CRUSH 1 TABLET AND GIVE VIA G-TUBE ONCE DAILY 30 Tablet 5    acetaminophen (TYLENOL) 500 mg tablet TAKE 1 TAB PER G-TUBE EVERY 4 HOURS AS NEEDED FOR MINOR PAIN/FEVER > 100 - NTE 6 TABS/24 HRS. 30 Tablet 5    Artificial Tears, polyvin alc, 1.4 % ophthalmic solution INSTILL 1 DROP INTO BOTH EYES TWICE DAILY FOR CONTINUOUS DRY EYE. OK TO PLACE IN FRIDGE AND PUT IN COLD. 15 mL 0    folic acid-vit P8-IZY B22 (Folbic) 2.5-25-2 mg tablet TAKE 1 TAB VIA G-TUBE 2 TIMES DAILY FOR FOLATE DEFICIENCY - CRUSH ANDDISSOLVE IN H2O (2/2.5/25) 62 Tab 0    baclofen (LIORESAL) 20 mg tablet TAKE 1 TAB VIA G-TUBE 3 TIMES DAILY FOR SPASTICITY - CRUSH AND DISSOLVE IN H2O 90 Tab 5    eyelid cleanser combination 3 (OcuSoft Lid Scrub Plus) padm USE 1 PAD TO SCRUB BOTH EYELIDS ONCE A DAY 30 Each 5    sorbitoL 70 % solution TAKE 15ML VIA G-TUBE IN THE MORNING TO MAINTAIN BOWEL REGULARITY 465 mL 10    menthol-zinc oxide (Calmoseptine) 0.44-20.6 % oint APPLY TOPICALLY TO G-TUBE SITE DAILY TO PREVENT OVERGROWTH AND IRRITATION AT SITE 113 g 2    clonazePAM (KlonoPIN) 0.5 mg TbDi disintegrating tablet Take 0.5 mg by mouth once as needed. For seizure >3min; if persists after rx, call 911      glycerin-mineral oil-lanolin (EUCERIN PLUS) topical cream Apply  to affected area as needed for Dry Skin. 226 g 5    OTHER as needed.  ibuprofen (MOTRIN) 600 mg tablet 1 Tab by Per G Tube route every eight (8) hours as needed for Pain. 90 Tab 0    diclofenac (VOLTAREN) 1 % gel Apply  to affected area four (4) times daily.  (Patient taking differently: Apply  to affected area as needed.) 500 g 0    nystatin (MYCOSTATIN) powder Apply to affected area twice daily with diaper change (Patient taking differently: as needed. Per caregiver, this medication is now PRN) 60 g 3    Retapamulin (ALTABAX) 1 % ointment Apply  to affected area two (2) times a day. (Patient taking differently: Apply  to affected area as needed.) 30 g 0    mupirocin calcium (BACTROBAN) 2 % topical cream Apply  to affected area two (2) times a day. (Patient taking differently: Apply  to affected area as needed.) 15 g 0    OTHER ACO as needed 1 Each 0    OTHER Stander for spine alignment  Strap for toe straightening 1 Each 0    lacosamide (VIMPAT) 200 mg tab tablet Take 200 mg by mouth two (2) times a day.  EUCALYP/ME-SALICYLATE/MEN/THYM (LISTERINE MM) by Mucous Membrane route.  BABY OIL, MINERAL OIL, EX by Apply Externally route.  MULTIVITAMIN,THERAPEUTIC (THERAVITE PO) Take  by mouth.  (Patient not taking: Reported on 7/19/2022)       Allergies   Allergen Reactions    Avelox [Moxifloxacin] Seizures    Lamictal [Lamotrigine] Rash    Lidocaine Other (comments)    Lyrica [Pregabalin] Other (comments)    Valproic Acid Other (comments)     Past Medical History:   Diagnosis Date    Cerebral palsy ([address]     Scoliosis     Seizures ([address]     Thyroid disease      Past Surgical History:   Procedure Laterality Date    HX ORTHOPAEDIC      foot surgery    [facility] ABDOMEN SURGERY PROC UNLISTED      g-tube     Family History   Problem Relation Age of Onset    Diabetes Sister      Social History     Tobacco Use    Smoking status: Never Smoker    Smokeless tobacco: Never Used   Substance Use Topics    Alcohol use: No       ROS     History obtained from  [facility] nurse  General ROS: negative for - chills, fatigue or fever  Psychological ROS: no able to assess   Ophthalmic ROS: no able to assess   ENT ROS: negative for - epistaxis, nasal congestion or nasal discharge  Allergy and Immunology ROS: negative  negative for - hives, insect bite sensitivity, itchy/watery eyes or nasal congestion  Hematological and Lymphatic ROS: negative for - bleeding problems, blood clots or blood transfusions        Respiratory ROS: no cough, shortness of breath, or wheezing  Cardiovascular ROS: negative for - dyspnea on exertion or edema  Gastrointestinal ROS: no abdominal pain, change in bowel habits, or black or bloody stools    History     Past Medical History:   Diagnosis Date    Cerebral palsy ([address]     Scoliosis     Seizures (HCC)     Thyroid disease       Past Surgical History:   Procedure Laterality Date    HX ORTHOPAEDIC      foot surgery    IN ABDOMEN SURGERY PROC UNLISTED      g-tube     Current Outpatient Medications   Medication Sig Dispense Refill    diazePAM (VALIUM) 10 mg tablet CRUSH 1 TABLET, MIX W/ WATER, AND GIVE VIA- G-TUBE 1 HOUR PRIOR TO EYE EXAM FOR ANXIETY 1 Tablet 2    zonisamide (ZONEGRAN) 100 mg capsule OPEN AND DISSOLVE 6 CAPSULES IN WATER AND ADMINSTER VIA G-TUBE 31 Capsule 0    levothyroxine (SYNTHROID) 25 mcg tablet TAKE 1 TAB VIA G-TUBE ONCE DAILY FOR HYPOTHYROIDISM - CRUSH AND DISSOLVE IN H2O 31 Tablet 0    calcitonin, salmon, (MIACALCIN) nasal USE 1 SPRAY IN ONE NOSTRIL DAILY FOR OSTEOPOROSIS - ALTERNATE NOSTRILS DAILY - **REFRIGERATE UNTIL OPENED;THEN STORE AT ROOM TEMP.;DISCARD 3 3.7 mL 0    Oyster Shell Calcium-Vit D3 500 mg(1,250mg) -200 unit per tablet TAKE 1 TAB VIA G-TUBE TWICE DAILY FOR NUTRITIONAL SUPPLEMENT - CRUSH AND DISSOLVE IN H2O 62 Tablet 0    loratadine (CLARITIN) 5 mg/5 mL syrup TAKE 10ML BY MOUTH DAILY FOR ALLERGIES 310 mL 0    multivit-min-FA-lycopen-lutein (CertaVite Senior) 5.6-141-171 mg-mcg-mcg tab CRUSH 1 TABLET AND GIVE VIA G-TUBE ONCE DAILY 30 Tablet 5    acetaminophen (TYLENOL) 500 mg tablet TAKE 1 TAB PER G-TUBE EVERY 4 HOURS AS NEEDED FOR MINOR PAIN/FEVER > 100 - NTE 6 TABS/24 HRS.  30 Tablet 5    Artificial Tears, polyvin alc, 1.4 % ophthalmic solution INSTILL 1 DROP INTO BOTH EYES TWICE DAILY FOR CONTINUOUS DRY EYE. OK TO PLACE IN FRIDGE AND PUT IN COLD. 15 mL 0    folic acid-vit S9-LQN N06 (Folbic) 2.5-25-2 mg tablet TAKE 1 TAB VIA G-TUBE 2 TIMES DAILY FOR FOLATE DEFICIENCY - CRUSH ANDDISSOLVE IN H2O (2/2.5/25) 62 Tab 0    baclofen (LIORESAL) 20 mg tablet TAKE 1 TAB VIA G-TUBE 3 TIMES DAILY FOR SPASTICITY - CRUSH AND DISSOLVE IN H2O 90 Tab 5    eyelid cleanser combination 3 (OcuSoft Lid Scrub Plus) padm USE 1 PAD TO SCRUB BOTH EYELIDS ONCE A DAY 30 Each 5    sorbitoL 70 % solution TAKE 15ML VIA G-TUBE IN THE MORNING TO MAINTAIN BOWEL REGULARITY 465 mL 10    menthol-zinc oxide (Calmoseptine) 0.44-20.6 % oint APPLY TOPICALLY TO G-TUBE SITE DAILY TO PREVENT OVERGROWTH AND IRRITATION AT SITE 113 g 2    clonazePAM (KlonoPIN) 0.5 mg TbDi disintegrating tablet Take 0.5 mg by mouth once as needed. For seizure >3min; if persists after rx, call 911      glycerin-mineral oil-lanolin (EUCERIN PLUS) topical cream Apply  to affected area as needed for Dry Skin. 226 g 5    OTHER as needed.  ibuprofen (MOTRIN) 600 mg tablet 1 Tab by Per G Tube route every eight (8) hours as needed for Pain. 90 Tab 0    diclofenac (VOLTAREN) 1 % gel Apply  to affected area four (4) times daily. (Patient taking differently: Apply  to affected area as needed.) 500 g 0    nystatin (MYCOSTATIN) powder Apply to affected area twice daily with diaper change (Patient taking differently: as needed. Per caregiver, this medication is now PRN) 60 g 3    Retapamulin (ALTABAX) 1 % ointment Apply  to affected area two (2) times a day. (Patient taking differently: Apply  to affected area as needed.) 30 g 0    mupirocin calcium (BACTROBAN) 2 % topical cream Apply  to affected area two (2) times a day.  (Patient taking differently: Apply  to affected area as needed.) 15 g 0    OTHER ACO as needed 1 Each 0    OTHER Stander for spine alignment  Strap for toe straightening 1 Each 0    lacosamide (VIMPAT) 200 mg tab tablet Take 200 mg by mouth two (2) times a day.  EUCALYP/ME-SALICYLATE/MEN/THYM (LISTERINE MM) by Mucous Membrane route.  BABY OIL, MINERAL OIL, EX by Apply Externally route.  MULTIVITAMIN,THERAPEUTIC (THERAVITE PO) Take  by mouth. (Patient not taking: Reported on 7/19/2022)       Allergies   Allergen Reactions    Avelox [Moxifloxacin] Seizures    Lamictal [Lamotrigine] Rash    Lidocaine Other (comments)    Lyrica [Pregabalin] Other (comments)    Valproic Acid Other (comments)     Family History   Problem Relation Age of Onset    Diabetes Sister      Social History     Tobacco Use    Smoking status: Never Smoker    Smokeless tobacco: Never Used   Substance Use Topics    Alcohol use: No     Patient Active Problem List   Diagnosis Code    Other cerebral palsy ([facility]) G80.8    Seizures ([facility] R56.9    Acquired hypothyroidism E03.9    Thrombocytopenia ([facility]) D69.6    Scoliosis deformity of spine M41.9    Requires continuous supervision for activities of daily living (ADL) Z74.3       Health Maintenance History  Immunizations reviewed,   He is due dtap  , zoster  Family will be notified and will decide  On vaccination  He is on the process of Finding Guardian  His niece can be located    Colonoscopy: , cologaurd      Eye exam:he is seeing ophthalmologist        Depression Risk Factor Screening:      Patient Health Questionnaire (PHQ-2)   Over the last 2 weeks, how often have you been bothered by any of the following problems? · Little interest or pleasure in doing things? · Not able to be evaluated   · Feeling down, depressed, or hopeless? · Not able to evaluate     Total Score:   PHQ-2 Assessment Scoring:   A score of 2 or more requires further screening with the PHQ-9    Alcohol Risk Factor Screening:     Women: On any occasion during the past 3 months, have you had more than 3 drinks containing alcohol? Do you average more than 7 drinks per week?   Men: On any occasion during the past 3 months, have you had more than 4 drinks containing alcohol? No   Do you average more than 14 drinks per week? NO     Functional Ability and Level of Safety:     Hearing Loss    Hearing is good. As per care giver     Activities of Daily Living   Total assistance. Requires assistance with: ambulation, bathing and hygiene, feeding, continence, grooming, toileting, dressing     Fall Risk   No fall risk factors  He is no wheel chair not able to wak no H/O fall     Abuse Screen   Patient is not abused  None    Examination   Physical Examination  Vitals:    07/19/22 1039   BP: 114/70   Pulse: (!) 102   Resp: 16   Temp: 98.7 °F (37.1 °C)   TempSrc: Temporal   SpO2: 96%   Height: 5' 7\" (1.702 m)      Body mass index is 17.23 kg/m². Evaluation of Cognitive Function:  Mood/affect: sleeping   Appearance:well groomed   Family member/caregiver input:i    alert, well appearing, and in no distress, uncooperative and he has been sleeping in his wheelchair    Patient Care Team:  Niecy Sheldon MD as PCP - General (Internal Medicine Physician)  Niecy Sheldon MD as PCP - [facility]  David Carolina MD (Ophthalmology)  Beata Allen MD (Gastroenterology)  Rohit Gregory MD (Psychiatry)  Pascual Moncada MD (Hematology and Oncology)  Juan Guzman MD (Orthopedic Surgery)  Elgin Chase DPM (Podiatry)    End-of-life planning  Advanced Directive in the case than an injury or illness causes the patient to be unable to make health care decisions    Health Care Directive or Living Will: no    Advice/Referrals/Counselling/Plan:   Education and counseling provided:  Are appropriate based on today's review and evaluation  Prostate cancer screening tests (PSA, covered annually)  Colorectal cancer screening tests  Screening for glaucoma  Diabetes screening test  Include in education list (weight loss, physical activity, smoking cessation, fall prevention, and nutrition)    ICD-10-CM ICD-9-CM    1.  Medicare annual wellness visit, subsequent  Z00.00 V70.0    2. Acquired hypothyroidism  E03.9 244.9    3. Vitamin D deficiency  E55.9 268.9    4. Requires continuous supervision for activities of daily living (ADL)  Z74.3 V49.89    5. Seizures (HCC)  R56.9 780.39    6. Other cerebral palsy (HCC)  G80.8 343.8    7. Osteopenia, unspecified location  M85.80 733.90    8. Thrombocytopenia (HCC)  D69.6 287.5    9. Screening for prostate cancer  Z12.5 V76.44 PSA SCREENING (SCREENING)     Encounter Diagnoses   Name Primary?  Medicare annual wellness visit, subsequent Yes    Acquired hypothyroidism     Vitamin D deficiency     Requires continuous supervision for activities of daily living (ADL)     Seizures ([address]     Other cerebral palsy ([address]     Osteopenia, unspecified location     Thrombocytopenia ([address]     Screening for prostate cancer      Orders Placed This Encounter    PSA SCREENING (SCREENING)     Orders Placed This Encounter    PSA SCREENING (SCREENING)     Standing Status:   Future     Number of Occurrences:   1     Standing Expiration Date:   7/20/2023     Orders Placed This Encounter    PSA SCREENING (SCREENING)     Diagnoses and all orders for this visit:    1. Medicare annual wellness visit, subsequent    2. Acquired hypothyroidism    3. Vitamin D deficiency    4. Requires continuous supervision for activities of daily living (ADL)    5. Seizures ([address]    6. Other cerebral palsy ([address]    7. Osteopenia, unspecified location    8. Thrombocytopenia ([address]    9. Screening for prostate cancer  -     PSA SCREENING (SCREENING); Future      Follow-up and Dispositions    · Return in about 6 months (around 1/19/2023). current treatment plan is effective, no change in therapy. Brief written plan, checklist    I have discussed the diagnosis with the patient and the intended plan as seen in the above orders. The patient has received an after-visit summary and questions were answered concerning future plans.   I have discussed medication side effects and warnings with the patient as well. I have reviewed the plan of care with the patient, accepted their input and they are in agreement with the treatment goals. Follow-up and Dispositions    · Return in about 6 months (around 1/19/2023).            ____________________________________________________________    Problem Assessment    for treatment of   Chief Complaint   Patient presents with   [facility] Wellness Visit

## 2022-08-08 DIAGNOSIS — E03.9 ACQUIRED HYPOTHYROIDISM: Primary | ICD-10-CM

## 2022-08-12 RX ORDER — LEVOTHYROXINE SODIUM 25 UG/1
TABLET ORAL
Qty: 90 TABLET | Refills: 3 | Status: SHIPPED | OUTPATIENT
Start: 2022-08-12

## 2022-09-28 ENCOUNTER — TELEPHONE (OUTPATIENT)
Dept: FAMILY MEDICINE CLINIC | Age: 52
End: 2022-09-28

## 2022-09-29 DIAGNOSIS — Z23 ENCOUNTER FOR IMMUNIZATION: Primary | ICD-10-CM

## 2022-09-30 NOTE — TELEPHONE ENCOUNTER
Pharmacy called with Rx Refill Request.  Requested Prescriptions     Pending Prescriptions Disp Refills    lacosamide (VIMPAT) 200 mg tab tablet       Sig: Take 1 Tablet by mouth two (2) times a day. Max Daily Amount: 400 mg.

## 2022-10-04 RX ORDER — LACOSAMIDE 200 MG/1
200 TABLET ORAL 2 TIMES DAILY
OUTPATIENT
Start: 2022-10-04

## 2022-10-17 ENCOUNTER — TELEPHONE (OUTPATIENT)
Dept: FAMILY MEDICINE CLINIC | Age: 52
End: 2022-10-17

## 2022-10-17 NOTE — TELEPHONE ENCOUNTER
Jah called on behalf of the pt from [facility] to inform Dr Smith that the pt tested positive for Covid today. I informed nurse Sherly who reached out to Dr Smith in regards to the pt testing positive for Covid. Sherly asked the nurse from home health if the pt was displaying any symptoms. Jah stated that the pt has no symptoms. Jah was informed that due to the pt having a g-tube asnd he is unable to swallow he is unable to be prescribed  paxlovid. Medication has to be swallowed and can not be crushed. Sherly did inform the nurse to monitor the pt's symtoms.

## 2022-11-02 ENCOUNTER — TELEPHONE (OUTPATIENT)
Dept: FAMILY MEDICINE CLINIC | Age: 52
End: 2022-11-02

## 2022-11-02 NOTE — TELEPHONE ENCOUNTER
Ms Saw Pitt called requesting to speak with Nurse Consuelo Wade in regards to pt. States that pharmacy told them that they needed to go to PCP's office to get Shingles shot. Pt's Primary Insurance is Medicare, I informed Ms Saw Pitt of this and that medicare doesn't cover the shingles shot. She then stated that pt has both Medicare and Medicaid and that Medicaid should be primary. Ms Saw Pitt would like a call back. Contact number: [phone]  No future appointments.

## 2022-11-04 NOTE — TELEPHONE ENCOUNTER
Spoke w/Shelia from [facility], pt has Medicare/Medicaid and pharmacy should be able to give the pt Shingles vaccine since Medicare is primary; Shelia will consult w/pharmacy and get back to our office.

## 2022-11-23 ENCOUNTER — TELEPHONE (OUTPATIENT)
Dept: FAMILY MEDICINE CLINIC | Age: 52
End: 2022-11-23

## 2022-11-23 NOTE — TELEPHONE ENCOUNTER
----- Message from Anthony Dutch sent at 11/18/2022 12:27 PM EST -----  Subject: Message to Provider    QUESTIONS  Information for Provider? Analisa Garcia at [facility] needs to verify   information to set up Mychart for the patient.  ---------------------------------------------------------------------------  --------------  [address] [place]  [phone]; OK to leave message on voicemail  ---------------------------------------------------------------------------  --------------  SCRIPT ANSWERS  Relationship to Patient? Third Party  Third Party Type? Nursing Home? Representative Name?  Analisa Garcia

## 2023-01-04 NOTE — TELEPHONE ENCOUNTER
Jah called on behalf of the pt from [address] to inform Dr Madeleine Ramirez that the pt tested positive for Covid today. I informed nurse Meka Cramer who reached out to Dr Madeleine Ramirez in regards to the pt testing positive for Covid. Meka Cramer asked the nurse from home health if the pt was displaying any symptoms. Jah stated that the pt has no symptoms. Jah was informed that due to the pt having a g-tube asnd he is unable to swallow he is unable to be prescribed  paxlovid. Medication has to be swallowed and can not be crushed. Meka Cramer did inform the nurse to monitor the pt's symtoms. Double O-Z Plasty Text: The defect edges were debeveled with a #15 scalpel blade.  Given the location of the defect, shape of the defect and the proximity to free margins a Double O-Z plasty (double transposition flap) was deemed most appropriate.  Using a sterile surgical marker, the appropriate transposition flaps were drawn incorporating the defect and placing the expected incisions within the relaxed skin tension lines where possible. The area thus outlined was incised deep to adipose tissue with a #15 scalpel blade.  The skin margins were undermined to an appropriate distance in all directions utilizing iris scissors.  Hemostasis was achieved with electrocautery.  The flaps were then transposed into place, one clockwise and the other counterclockwise, and anchored with interrupted buried subcutaneous sutures.

## 2023-03-13 ENCOUNTER — OFFICE VISIT (OUTPATIENT)
Dept: FAMILY MEDICINE CLINIC | Facility: CLINIC | Age: 53
End: 2023-03-13
Payer: MEDICARE

## 2023-03-13 VITALS
HEIGHT: 67 IN | OXYGEN SATURATION: 95 % | TEMPERATURE: 98.2 F | BODY MASS INDEX: 17.23 KG/M2 | DIASTOLIC BLOOD PRESSURE: 76 MMHG | SYSTOLIC BLOOD PRESSURE: 116 MMHG | RESPIRATION RATE: 15 BRPM | HEART RATE: 95 BPM

## 2023-03-13 DIAGNOSIS — R56.9 CONVULSIONS, UNSPECIFIED CONVULSION TYPE (HCC): ICD-10-CM

## 2023-03-13 DIAGNOSIS — G80.8 OTHER CEREBRAL PALSY (HCC): ICD-10-CM

## 2023-03-13 DIAGNOSIS — D69.6 THROMBOCYTOPENIA, UNSPECIFIED (HCC): ICD-10-CM

## 2023-03-13 DIAGNOSIS — R56.00 SIMPLE FEBRILE CONVULSIONS (HCC): ICD-10-CM

## 2023-03-13 DIAGNOSIS — Z23 ENCOUNTER FOR IMMUNIZATION: ICD-10-CM

## 2023-03-13 DIAGNOSIS — E03.9 HYPOTHYROIDISM, UNSPECIFIED TYPE: Primary | ICD-10-CM

## 2023-03-13 PROCEDURE — G0009 ADMIN PNEUMOCOCCAL VACCINE: HCPCS | Performed by: LEGAL MEDICINE

## 2023-03-13 PROCEDURE — G8427 DOCREV CUR MEDS BY ELIG CLIN: HCPCS | Performed by: LEGAL MEDICINE

## 2023-03-13 PROCEDURE — 3017F COLORECTAL CA SCREEN DOC REV: CPT | Performed by: LEGAL MEDICINE

## 2023-03-13 PROCEDURE — G8484 FLU IMMUNIZE NO ADMIN: HCPCS | Performed by: LEGAL MEDICINE

## 2023-03-13 PROCEDURE — 90677 PCV20 VACCINE IM: CPT | Performed by: LEGAL MEDICINE

## 2023-03-13 PROCEDURE — 1036F TOBACCO NON-USER: CPT | Performed by: LEGAL MEDICINE

## 2023-03-13 PROCEDURE — 99214 OFFICE O/P EST MOD 30 MIN: CPT | Performed by: LEGAL MEDICINE

## 2023-03-13 PROCEDURE — G8419 CALC BMI OUT NRM PARAM NOF/U: HCPCS | Performed by: LEGAL MEDICINE

## 2023-03-13 RX ORDER — WATER / MINERAL OIL / WHITE PETROLATUM 16 OZ
CREAM TOPICAL PRN
COMMUNITY

## 2023-03-13 RX ORDER — CLOTRIMAZOLE 1 %
CREAM (GRAM) TOPICAL 2 TIMES DAILY
COMMUNITY

## 2023-03-13 RX ORDER — ERYTHROMYCIN 5 MG/G
OINTMENT OPHTHALMIC
COMMUNITY
Start: 2019-02-14

## 2023-03-13 RX ORDER — BISACODYL 10 MG
SUPPOSITORY, RECTAL RECTAL
COMMUNITY
Start: 2022-12-20

## 2023-03-13 RX ORDER — EYELID CLEANSER COMBINATION 3
TOWELETTE (EA) TOPICAL
COMMUNITY
Start: 2021-03-31

## 2023-03-13 RX ORDER — GUAIFENESIN 200 MG/10ML
LIQUID ORAL
COMMUNITY

## 2023-03-13 RX ORDER — SODIUM PHOSPHATE, DIBASIC AND SODIUM PHOSPHATE, MONOBASIC 7; 19 G/133ML; G/133ML
1 ENEMA RECTAL
COMMUNITY

## 2023-03-13 RX ORDER — SODIUM PHOSPHATE,MONO-DIBASIC 19G-7G/118
ENEMA (ML) RECTAL
COMMUNITY
Start: 2022-12-20

## 2023-03-13 SDOH — ECONOMIC STABILITY: FOOD INSECURITY: WITHIN THE PAST 12 MONTHS, THE FOOD YOU BOUGHT JUST DIDN'T LAST AND YOU DIDN'T HAVE MONEY TO GET MORE.: PATIENT DECLINED

## 2023-03-13 SDOH — ECONOMIC STABILITY: HOUSING INSECURITY
IN THE LAST 12 MONTHS, WAS THERE A TIME WHEN YOU DID NOT HAVE A STEADY PLACE TO SLEEP OR SLEPT IN A SHELTER (INCLUDING NOW)?: NO

## 2023-03-13 SDOH — ECONOMIC STABILITY: INCOME INSECURITY: HOW HARD IS IT FOR YOU TO PAY FOR THE VERY BASICS LIKE FOOD, HOUSING, MEDICAL CARE, AND HEATING?: PATIENT DECLINED

## 2023-03-13 SDOH — ECONOMIC STABILITY: FOOD INSECURITY: WITHIN THE PAST 12 MONTHS, YOU WORRIED THAT YOUR FOOD WOULD RUN OUT BEFORE YOU GOT MONEY TO BUY MORE.: PATIENT DECLINED

## 2023-03-13 ASSESSMENT — PATIENT HEALTH QUESTIONNAIRE - PHQ9
SUM OF ALL RESPONSES TO PHQ QUESTIONS 1-9: 0
SUM OF ALL RESPONSES TO PHQ QUESTIONS 1-9: 0
2. FEELING DOWN, DEPRESSED OR HOPELESS: 0
SUM OF ALL RESPONSES TO PHQ QUESTIONS 1-9: 0
SUM OF ALL RESPONSES TO PHQ QUESTIONS 1-9: 0
1. LITTLE INTEREST OR PLEASURE IN DOING THINGS: 0
SUM OF ALL RESPONSES TO PHQ9 QUESTIONS 1 & 2: 0

## 2023-03-13 ASSESSMENT — ENCOUNTER SYMPTOMS
VOMITING: 0
COLOR CHANGE: 0
EYE REDNESS: 0
EYE DISCHARGE: 0

## 2023-03-13 ASSESSMENT — ANXIETY QUESTIONNAIRES
5. BEING SO RESTLESS THAT IT IS HARD TO SIT STILL: 0
2. NOT BEING ABLE TO STOP OR CONTROL WORRYING: 0
4. TROUBLE RELAXING: 0
GAD7 TOTAL SCORE: 0
1. FEELING NERVOUS, ANXIOUS, OR ON EDGE: 0
6. BECOMING EASILY ANNOYED OR IRRITABLE: 0
7. FEELING AFRAID AS IF SOMETHING AWFUL MIGHT HAPPEN: 0
IF YOU CHECKED OFF ANY PROBLEMS ON THIS QUESTIONNAIRE, HOW DIFFICULT HAVE THESE PROBLEMS MADE IT FOR YOU TO DO YOUR WORK, TAKE CARE OF THINGS AT HOME, OR GET ALONG WITH OTHER PEOPLE: NOT DIFFICULT AT ALL
3. WORRYING TOO MUCH ABOUT DIFFERENT THINGS: 0

## 2023-03-13 NOTE — PROGRESS NOTES
Kaia Ferreira     Chief Complaint   Patient presents with    Follow-up     Follow up     /76 (Site: Left Upper Arm, Position: Sitting, Cuff Size: Small Adult)   Pulse 95   Temp 98.2 °F (36.8 °C) (Temporal)   Resp 15   Ht 5' 7\" (1.702 m)   SpO2 95%   BMI 17.23 kg/m²         HPI:  Kaia Ferreira   He is here accompanied by a nurse who knows him for over 5 year at the group home her name Florencia , review of system has been taken by the help of his caregiver  Patient has been sleeping on the wheelchair for the whole time he does wake up for few seconds and look around and then go back to sleep  Patient has feeding tube          Past Medical History:   Diagnosis Date    Cerebral palsy ([address]     Scoliosis     Seizures ([address]     Thyroid disease      Past Surgical History:   Procedure Laterality Date    ORTHOPEDIC SURGERY      foot surgery    HI UNLISTED PROCEDURE ABDOMEN PERITONEUM & OMENTUM      g-tube     Social History     Tobacco Use    Smoking status: Never    Smokeless tobacco: Never   Substance Use Topics    Alcohol use: No       Family History   Problem Relation Age of Onset    Diabetes Sister        Review of Systems   Constitutional:  Negative for chills and fever. Eyes:  Negative for discharge and redness. Cardiovascular:  Negative for leg swelling. Gastrointestinal:  Negative for vomiting. Musculoskeletal:  Positive for gait problem. Skin:  Negative for color change, pallor, rash and wound. Neurological:  Negative for tremors, seizures, syncope and facial asymmetry. Psychiatric/Behavioral:  Negative for agitation. Physical Exam  Vitals and nursing note reviewed. Exam conducted with a chaperone present. Constitutional:       General: He is not in acute distress. Appearance: He is normal weight. He is not ill-appearing, toxic-appearing or diaphoretic.       Comments: Patient is nonverbal pain or wheelchair with no distress   HENT:      Nose: Nose normal. No congestion or rhinorrhea. Eyes:      General: No scleral icterus. Right eye: No discharge. Left eye: No discharge. Cardiovascular:      Rate and Rhythm: Normal rate and regular rhythm. Pulmonary:      Effort: Pulmonary effort is normal. No respiratory distress. Breath sounds: Normal breath sounds. No stridor. No wheezing or rhonchi. Abdominal:      General: There is no distension. Tenderness: There is no abdominal tenderness. There is no guarding. Musculoskeletal:         General: No swelling or tenderness. Cervical back: No rigidity or tenderness. Right lower leg: No edema. Left lower leg: No edema. Lymphadenopathy:      Cervical: No cervical adenopathy. Skin:     Capillary Refill: Capillary refill takes more than 3 seconds. Coloration: Skin is not jaundiced. Findings: No bruising, lesion or rash. Neurological:      Mental Status: Mental status is at baseline. Gait: Gait abnormal.        Assessment and plan     Plan of care has been discussed with the patient, he agrees to the plan and verbalized understanding. All his questions were answered  More than 50% of the time spent in this visit was counseling the patient about  illness and treatment options         ICD-10-CM    1. Hypothyroidism, unspecified type  E03.9    He is adherent to medications    2. Other cerebral palsy ([address]  G80.8       3. Simple febrile convulsions (HCC)  R56.00       4. Thrombocytopenia, unspecified ([address]  D69.6       5. Convulsions, unspecified convulsion type ([address]  R56.9    Stable on current medication   6.  Encounter for immunization  Z23 Pneumococcal, PCV20, PREVNAR 21, (age 25 yrs+), IM, PF   Vaccination was given with no  complications       Current Outpatient Medications   Medication Sig Dispense Refill    Multiple Vitamins-Minerals (CERTAVITE SENIOR PO) CRUSH 1 TABLET AND GIVE VIA G-TUBE ONCE DAILY      Mouthwashes (LISTERINE MT) by Transmucosal route      Eyelid Cleansers (OCUSOFT BABY EYELID & EYELASH) PADS USE 1 PAD TO SCRUB BOTH EYELIDS ONCE A DAY      FA-Pyridoxine-Cyanocobalamin (WESTAB MAX PO) Take by mouth      clotrimazole (ANTIFUNGAL, CLOTRIMAZOLE,) 1 % cream Apply topically 2 times daily Apply topically 2 times daily. petrolatum, lanolin, 8-hydroxyquinoline sulfate (BAG BALM) OINT Apply topically every 4 hours as needed      bisacodyl (DULCOLAX) 10 MG suppository       diphenhydrAMINE (BENYLIN) 12.5 MG/5ML liquid       sodium phosphate (FLEET) 7-19 GM/118ML enema       sodium phosphate (FLEET) 7-19 GM/118ML Place 1 enema rectally once as needed      erythromycin (ROMYCIN) 5 MG/GM ophthalmic ointment erythromycin 5 mg/gram (0.5 %) eye ointment   prn      guaiFENesin (ROBITUSSIN) 100 MG/5ML liquid guaifenesin      Skin Protectants, Misc. (EUCERIN) cream Apply topically as needed for Dry Skin Apply topically as needed. magnesium hydroxide (MILK OF MAGNESIA CONCENTRATE) 2400 MG/10ML SUSP Take 2,400 mg by mouth once as needed      Sennosides (PRUNE SENNA CONCENTRATE PO) Take by mouth      SUNSCREEN SPF30 EX Apply topically      Neomycin-Bacitracin-Polymyxin (TRIPLE ANTIBIOTIC EX) Apply topically      Zinc Oxide 10 % OINT Apply topically      acetaminophen (TYLENOL) 500 MG tablet TAKE 1 TAB PER G-TUBE EVERY 4 HOURS AS NEEDED FOR MINOR PAIN/FEVER > 100 - NTE 6 TABS/24 HRS. baclofen (LIORESAL) 20 MG tablet TAKE 1 TAB VIA G-TUBE 3 TIMES DAILY FOR SPASTICITY - CRUSH AND DISSOLVE IN H2O      calcitonin (MIACALCIN) 200 UNIT/ACT nasal spray USE 1 SPRAY IN ONE NOSTRIL DAILY FOR OSTEOPOROSIS - ALTERNATE NOSTRILS DAILY - **REFRIGERATE UNTIL OPENED;THEN STORE AT ROOM TEMP.;DISCARD 3      Calcium Carbonate-Vitamin D (OYSTER SHELL CALCIUM/D) 500-5 MG-MCG TABS TAKE 1 TAB VIA G-TUBE TWICE DAILY FOR NUTRITIONAL SUPPLEMENT - CRUSH AND DISSOLVE IN H2O      clonazePAM (KLONOPIN) 0.5 MG disintegrating tablet Take 0.5 mg by mouth once as needed.       diazePAM (VALIUM) 10 MG tablet CRUSH 1 TABLET, MIX W/ WATER, AND GIVE VIA- G-TUBE 1 HOUR PRIOR TO EYE EXAM FOR ANXIETY      diclofenac sodium (VOLTAREN) 1 % GEL Apply topically 4 times daily      folic acid-pyridoxine-cyancobalamin (FOLBIC) 2.5-25-2 MG TABS TAKE 1 TAB VIA G-TUBE 2 TIMES DAILY FOR FOLATE DEFICIENCY - CRUSH ANDDISSOLVE IN H2O (2/2.5/25)      lacosamide (VIMPAT) 200 MG tablet Take 200 mg by mouth 2 times daily. levothyroxine (SYNTHROID) 25 MCG tablet TAKE 1 TAB VIA G-TUBE ONCE DAILY FOR HYPOTHYROIDISM - CRUSH AND DISSOLVE IN H2O      loratadine (CLARITIN) 5 MG/5ML syrup TAKE 10ML BY MOUTH DAILY FOR ALLERGIES      menthol-zinc oxide (CALMOSEPTINE) 0.44-20.6 % OINT ointment APPLY TOPICALLY TO G-TUBE SITE DAILY TO PREVENT OVERGROWTH AND IRRITATION AT SITE      mupirocin (BACTROBAN) 2 % cream Apply topically 2 times daily      nystatin (MYCOSTATIN) 528060 UNIT/GM powder Apply to affected area twice daily with diaper change      polyvinyl alcohol (LIQUIFILM TEARS) 1.4 % ophthalmic solution INSTILL 1 DROP INTO BOTH EYES TWICE DAILY FOR CONTINUOUS DRY EYE. OK TO PLACE IN FRIDGE AND PUT IN COLD.      sorbitol 70 % solution TAKE 15ML VIA G-TUBE IN THE MORNING TO MAINTAIN BOWEL REGULARITY      zonisamide (ZONEGRAN) 100 MG capsule OPEN AND DISSOLVE 6 CAPSULES IN WATER AND ADMINSTER VIA G-TUBE       No current facility-administered medications for this visit. Patient Active Problem List    Diagnosis Date Noted    Requires continuous supervision for activities of daily living (ADL) 05/20/2020    Scoliosis deformity of spine 02/14/2019    Other cerebral palsy ([address] 01/17/2018    Acquired hypothyroidism 01/17/2018    Seizures ([address] 01/17/2018    Thrombocytopenia ([address] 02/18/2012     No results found for this visit on 03/13/23. No visits with results within 3 Month(s) from this visit. Latest known visit with results is:   No results found for any previous visit.           Return in about 4 months (around 7/13/2023) for medicare wellness  to get labs done 1 week prior .

## 2023-03-13 NOTE — PROGRESS NOTES
Rashaun Velázquez is a 46 y.o. male (: 1970) presenting to address:    Chief Complaint   Patient presents with    Follow-up     Follow up       Vitals:    23 0941   BP: 116/76   Pulse: 95   Resp: 15   Temp: 98.2 °F (36.8 °C)   SpO2: 95%       Coordination of Care Questionaire:   1. \"Have you been to the ER, urgent care clinic since your last visit? Hospitalized since your last visit? \" No    2. \"Have you seen or consulted any other health care providers outside of the [address] [place] [address] since your last visit? \" No     3. For patients aged 39-70: Has the patient had a colonoscopy / FIT/ Cologuard? Yes - no Care Gap present      If the patient is female:    4. For patients aged 41-77: Has the patient had a mammogram within the past 2 years? NA - based on age or sex      11. For patients aged 21-65: Has the patient had a pap smear? NA - based on age or sex    Advanced Directive:   1. Do you have an Advanced Directive? No    2. Would you like information on Advanced Directives?  Yes    Immunizations Administered       Name Date Dose Route    Pneumococcal conjugate PCV20, PF (Prevnar 20) 3/13/2023 0.5 mL Intramuscular    Site: Deltoid- Left    Lot: [id number]    NDC: 3193-3974-58

## 2023-03-14 DIAGNOSIS — R45.1 AGITATION REQUIRING SEDATION PROTOCOL: ICD-10-CM

## 2023-03-14 DIAGNOSIS — R56.9 CONVULSIONS, UNSPECIFIED CONVULSION TYPE (HCC): Primary | ICD-10-CM

## 2023-03-17 RX ORDER — CLONAZEPAM 0.5 MG/1
0.5 TABLET, ORALLY DISINTEGRATING ORAL
Qty: 10 TABLET | Refills: 0 | Status: SHIPPED | OUTPATIENT
Start: 2023-03-17 | End: 2023-03-17

## 2023-03-17 RX ORDER — DIAZEPAM 10 MG/1
TABLET ORAL
Qty: 4 TABLET | Refills: 0 | Status: SHIPPED | OUTPATIENT
Start: 2023-03-17 | End: 2023-06-15

## 2023-05-02 DIAGNOSIS — R56.9 CONVULSIONS, UNSPECIFIED CONVULSION TYPE (HCC): Primary | ICD-10-CM

## 2023-05-05 RX ORDER — LACOSAMIDE 200 MG/1
200 TABLET ORAL 2 TIMES DAILY
Qty: 180 TABLET | Refills: 1 | Status: SHIPPED | OUTPATIENT
Start: 2023-05-05 | End: 2023-08-03

## 2023-05-09 DIAGNOSIS — R56.9 CONVULSIONS, UNSPECIFIED CONVULSION TYPE (HCC): ICD-10-CM

## 2023-05-10 ENCOUNTER — TELEPHONE (OUTPATIENT)
Dept: FAMILY MEDICINE CLINIC | Facility: CLINIC | Age: 53
End: 2023-05-10

## 2023-05-10 DIAGNOSIS — R56.9 CONVULSIONS, UNSPECIFIED CONVULSION TYPE (HCC): Primary | ICD-10-CM

## 2023-05-10 RX ORDER — LACOSAMIDE 200 MG/1
200 TABLET ORAL 2 TIMES DAILY
Qty: 180 TABLET | Refills: 1 | OUTPATIENT
Start: 2023-05-10 | End: 2023-08-08

## 2023-05-11 NOTE — TELEPHONE ENCOUNTER
Due to controlled substance the medication can not be transferred. Eboni Hernandez stated that she is afraid that the pt will miss his dosage she would like to know if the prescription could be sent by today.  Informed Eboni Hernandez that we were waiting one the provider to respond

## 2023-05-12 RX ORDER — LACOSAMIDE 200 MG/1
200 TABLET ORAL 2 TIMES DAILY
Qty: 180 TABLET | Refills: 3 | Status: SHIPPED | OUTPATIENT
Start: 2023-05-12 | End: 2023-08-10

## 2023-05-12 NOTE — TELEPHONE ENCOUNTER
The refills went to the wrong pharmacy; they need to be resent to [facility], pt's Vimpat medication

## 2023-07-12 ENCOUNTER — HOSPITAL ENCOUNTER (OUTPATIENT)
Facility: HOSPITAL | Age: 53
Setting detail: SPECIMEN
Discharge: HOME | End: 2023-07-15
Payer: MEDICARE

## 2023-07-12 ENCOUNTER — OFFICE VISIT (OUTPATIENT)
Dept: FAMILY MEDICINE CLINIC | Facility: CLINIC | Age: 53
End: 2023-07-12
Payer: MEDICARE

## 2023-07-12 VITALS
RESPIRATION RATE: 16 BRPM | HEART RATE: 69 BPM | TEMPERATURE: 97.9 F | BODY MASS INDEX: 19.23 KG/M2 | HEIGHT: 67 IN | OXYGEN SATURATION: 96 % | WEIGHT: 122.5 LBS | SYSTOLIC BLOOD PRESSURE: 105 MMHG | DIASTOLIC BLOOD PRESSURE: 71 MMHG

## 2023-07-12 DIAGNOSIS — G80.8 OTHER CEREBRAL PALSY (HCC): ICD-10-CM

## 2023-07-12 DIAGNOSIS — E03.9 HYPOTHYROIDISM, UNSPECIFIED TYPE: ICD-10-CM

## 2023-07-12 DIAGNOSIS — D69.6 THROMBOCYTOPENIA (HCC): ICD-10-CM

## 2023-07-12 DIAGNOSIS — E03.9 ACQUIRED HYPOTHYROIDISM: ICD-10-CM

## 2023-07-12 DIAGNOSIS — M41.9 SCOLIOSIS, UNSPECIFIED SCOLIOSIS TYPE, UNSPECIFIED SPINAL REGION: ICD-10-CM

## 2023-07-12 DIAGNOSIS — E55.9 VITAMIN D DEFICIENCY, UNSPECIFIED: ICD-10-CM

## 2023-07-12 DIAGNOSIS — R56.9 SEIZURES (HCC): ICD-10-CM

## 2023-07-12 DIAGNOSIS — Z12.5 ENCOUNTER FOR SCREENING FOR MALIGNANT NEOPLASM OF PROSTATE: ICD-10-CM

## 2023-07-12 DIAGNOSIS — Z66 DNR (DO NOT RESUSCITATE): ICD-10-CM

## 2023-07-12 DIAGNOSIS — R56.9 CONVULSIONS, UNSPECIFIED CONVULSION TYPE (HCC): ICD-10-CM

## 2023-07-12 DIAGNOSIS — Z74.3 REQUIRES CONTINUOUS SUPERVISION FOR ACTIVITIES OF DAILY LIVING (ADL): ICD-10-CM

## 2023-07-12 DIAGNOSIS — Z00.00 MEDICARE ANNUAL WELLNESS VISIT, SUBSEQUENT: Primary | ICD-10-CM

## 2023-07-12 DIAGNOSIS — Z00.00 MEDICARE ANNUAL WELLNESS VISIT, SUBSEQUENT: ICD-10-CM

## 2023-07-12 LAB
25(OH)D3 SERPL-MCNC: 38.3 NG/ML (ref 30–100)
ALBUMIN SERPL-MCNC: 3.3 G/DL (ref 3.4–5)
ALBUMIN/GLOB SERPL: 1.1 (ref 0.8–1.7)
ALP SERPL-CCNC: 81 U/L (ref 45–117)
ALT SERPL-CCNC: 29 U/L (ref 16–61)
ANION GAP SERPL CALC-SCNC: 4 MMOL/L (ref 3–18)
AST SERPL-CCNC: 14 U/L (ref 10–38)
BASOPHILS # BLD: 0 K/UL (ref 0–0.1)
BASOPHILS NFR BLD: 1 % (ref 0–2)
BILIRUB SERPL-MCNC: 0.3 MG/DL (ref 0.2–1)
BUN SERPL-MCNC: 15 MG/DL (ref 7–18)
BUN/CREAT SERPL: 17 (ref 12–20)
CALCIUM SERPL-MCNC: 8.4 MG/DL (ref 8.5–10.1)
CHLORIDE SERPL-SCNC: 111 MMOL/L (ref 100–111)
CHOLEST SERPL-MCNC: 126 MG/DL
CO2 SERPL-SCNC: 26 MMOL/L (ref 21–32)
CREAT SERPL-MCNC: 0.89 MG/DL (ref 0.6–1.3)
DIFFERENTIAL METHOD BLD: NORMAL
EOSINOPHIL # BLD: 0.2 K/UL (ref 0–0.4)
EOSINOPHIL NFR BLD: 3 % (ref 0–5)
ERYTHROCYTE [DISTWIDTH] IN BLOOD BY AUTOMATED COUNT: 13.1 % (ref 11.6–14.5)
GLOBULIN SER CALC-MCNC: 3 G/DL (ref 2–4)
GLUCOSE SERPL-MCNC: 101 MG/DL (ref 74–99)
HCT VFR BLD AUTO: 43.8 % (ref 36–48)
HDLC SERPL-MCNC: 56 MG/DL (ref 40–60)
HDLC SERPL: 2.3 (ref 0–5)
HGB BLD-MCNC: 14.7 G/DL (ref 13–16)
IMM GRANULOCYTES # BLD AUTO: 0 K/UL (ref 0–0.04)
IMM GRANULOCYTES NFR BLD AUTO: 0 % (ref 0–0.5)
LDLC SERPL CALC-MCNC: 61.4 MG/DL (ref 0–100)
LIPID PANEL: NORMAL
LYMPHOCYTES # BLD: 1.8 K/UL (ref 0.9–3.6)
LYMPHOCYTES NFR BLD: 34 % (ref 21–52)
MCH RBC QN AUTO: 31.8 PG (ref 24–34)
MCHC RBC AUTO-ENTMCNC: 33.6 G/DL (ref 31–37)
MCV RBC AUTO: 94.8 FL (ref 78–100)
MONOCYTES # BLD: 0.4 K/UL (ref 0.05–1.2)
MONOCYTES NFR BLD: 7 % (ref 3–10)
NEUTS SEG # BLD: 2.9 K/UL (ref 1.8–8)
NEUTS SEG NFR BLD: 55 % (ref 40–73)
NRBC # BLD: 0 K/UL (ref 0–0.01)
NRBC BLD-RTO: 0 PER 100 WBC
PLATELET # BLD AUTO: 149 K/UL (ref 135–420)
PMV BLD AUTO: 11.8 FL (ref 9.2–11.8)
POTASSIUM SERPL-SCNC: 3.9 MMOL/L (ref 3.5–5.5)
PROT SERPL-MCNC: 6.3 G/DL (ref 6.4–8.2)
PSA SERPL-MCNC: 2.3 NG/ML (ref 0–4)
RBC # BLD AUTO: 4.62 M/UL (ref 4.35–5.65)
SODIUM SERPL-SCNC: 141 MMOL/L (ref 136–145)
T4 FREE SERPL-MCNC: 0.9 NG/DL (ref 0.7–1.5)
TRIGL SERPL-MCNC: 43 MG/DL
TSH SERPL DL<=0.05 MIU/L-ACNC: 1.33 UIU/ML (ref 0.36–3.74)
VLDLC SERPL CALC-MCNC: 8.6 MG/DL
WBC # BLD AUTO: 5.3 K/UL (ref 4.6–13.2)

## 2023-07-12 PROCEDURE — 82306 VITAMIN D 25 HYDROXY: CPT

## 2023-07-12 PROCEDURE — G0103 PSA SCREENING: HCPCS

## 2023-07-12 PROCEDURE — G0439 PPPS, SUBSEQ VISIT: HCPCS | Performed by: LEGAL MEDICINE

## 2023-07-12 PROCEDURE — 3017F COLORECTAL CA SCREEN DOC REV: CPT | Performed by: LEGAL MEDICINE

## 2023-07-12 PROCEDURE — 84439 ASSAY OF FREE THYROXINE: CPT

## 2023-07-12 PROCEDURE — 80053 COMPREHEN METABOLIC PANEL: CPT

## 2023-07-12 PROCEDURE — 36415 COLL VENOUS BLD VENIPUNCTURE: CPT

## 2023-07-12 PROCEDURE — 85025 COMPLETE CBC W/AUTO DIFF WBC: CPT

## 2023-07-12 PROCEDURE — 80061 LIPID PANEL: CPT

## 2023-07-12 PROCEDURE — 84443 ASSAY THYROID STIM HORMONE: CPT

## 2023-07-12 ASSESSMENT — PATIENT HEALTH QUESTIONNAIRE - PHQ9
SUM OF ALL RESPONSES TO PHQ QUESTIONS 1-9: 0
2. FEELING DOWN, DEPRESSED OR HOPELESS: 0
1. LITTLE INTEREST OR PLEASURE IN DOING THINGS: 0
SUM OF ALL RESPONSES TO PHQ QUESTIONS 1-9: 0
SUM OF ALL RESPONSES TO PHQ9 QUESTIONS 1 & 2: 0

## 2023-07-12 ASSESSMENT — LIFESTYLE VARIABLES
HOW MANY STANDARD DRINKS CONTAINING ALCOHOL DO YOU HAVE ON A TYPICAL DAY: PATIENT DOES NOT DRINK
HOW OFTEN DO YOU HAVE A DRINK CONTAINING ALCOHOL: NEVER

## 2023-07-12 NOTE — PROGRESS NOTES
Medicare Annual Wellness Visit    Chris Chaves is here for Medicare AWV    Assessment & Plan   Medicare annual wellness visit, subsequent  -     Lipid Panel; Future  -     Comprehensive Metabolic Panel; Future  -     CBC with Auto Differential; Future  -     TSH + Free T4 Panel; Future  Convulsions, unspecified convulsion type ([address])  Other cerebral palsy ([address])  Hypothyroidism, unspecified type  -     Lipid Panel; Future  -     TSH + Free T4 Panel; Future  Vitamin D deficiency, unspecified  -     Vitamin D 25 Hydroxy; Future  Acquired hypothyroidism  Requires continuous supervision for activities of daily living (ADL)  Scoliosis, unspecified scoliosis type, unspecified spinal region  Seizures [facility])  Encounter for screening for malignant neoplasm of prostate  -     PSA Screening; Future  Thrombocytopenia (HCC)  -     CBC with Auto Differential; Future  DNR (do not resuscitate)  -     DO NOT RESUSCITATE (DNR)  Recommendations for Preventive Services Due: see orders and patient instructions/AVS.    Recommended screening schedule for the next 5-10 years is provided to the patient in written form: see Patient Instructions/AVS.  Chris Chaves is a 46 y.o.  male and presents for an annual wellness exam   wheel chair non verbal      He is here accompanied by a nurse who knows him for over 5 year at the group home her name Florencia , review of system has been taken by the help of his caregiver  Patient has been sleeping on the wheelchair for the whole time  He has no acute issues        Return in about 6 months (around 1/12/2024) for Establish  with new PCP. Subjective       Patient's complete Health Risk Assessment and screening values have been reviewed and are found in Flowsheets. The following problems were reviewed today and where indicated follow up appointments were made and/or referrals ordered.     Positive Risk Factor Screenings with Interventions:                      ADL's:   Patient reports needing

## 2023-07-12 NOTE — PROGRESS NOTES
Zafar Campbell is a 46 y.o. male (: 1970) presenting to address:    Chief Complaint   Patient presents with    Medicare AWV       Vitals:    23 1244   Resp: 16       Coordination of Care Questionaire:   1. \"Have you been to the ER, urgent care clinic since your last visit? No Hospitalized since your last visit? \" No    2. \"Have you seen or consulted any other health care providers outside of the [address] [place] [address] since your last visit? \" Yes     3. For patients aged 43-73: Has the patient had a colonoscopy / FIT/ Cologuard? Yes      If the patient is female:    4. For patients aged 43-66: Has the patient had a mammogram within the past 2 years? N/A      5. For patients aged 21-65: Has the patient had a pap smear? N/A    Advanced Directive:   1. Do you have an Advanced Directive? No    2. Would you like information on Advanced Directives?  No

## 2023-09-18 DIAGNOSIS — R45.1 AGITATION REQUIRING SEDATION PROTOCOL: ICD-10-CM

## 2023-09-18 DIAGNOSIS — R56.9 CONVULSIONS, UNSPECIFIED CONVULSION TYPE (HCC): ICD-10-CM

## 2023-09-18 RX ORDER — CLONAZEPAM 0.5 MG/1
0.5 TABLET, ORALLY DISINTEGRATING ORAL
Qty: 10 TABLET | Refills: 0 | Status: SHIPPED | OUTPATIENT
Start: 2023-09-18 | End: 2023-09-18

## 2023-09-18 RX ORDER — DIAZEPAM 10 MG/1
TABLET ORAL
Qty: 4 TABLET | Refills: 0 | Status: SHIPPED | OUTPATIENT
Start: 2023-09-18 | End: 2023-12-17

## 2023-09-18 NOTE — TELEPHONE ENCOUNTER
Received refill requests from mail order pharmacy:    3/17/23; Clonazepam 0.5 mg; qty 10 w/no refills; PRN for seizures    3/17/23; diazepam 10 mg tab; qty 4 w/no refills; PRN for anxiety

## 2023-09-19 ENCOUNTER — TELEPHONE (OUTPATIENT)
Dept: FAMILY MEDICINE CLINIC | Facility: CLINIC | Age: 53
End: 2023-09-19

## 2023-10-23 ENCOUNTER — TELEPHONE (OUTPATIENT)
Dept: FAMILY MEDICINE CLINIC | Facility: CLINIC | Age: 53
End: 2023-10-23

## 2023-11-22 DIAGNOSIS — R56.9 CONVULSIONS, UNSPECIFIED CONVULSION TYPE (HCC): ICD-10-CM

## 2023-11-22 NOTE — TELEPHONE ENCOUNTER
Paula Don called for their medication refill.     Last Office visit:  2023    Last Filled: Rx     Follow up visit:    Future Appointments   Date Time Provider [address] [place] [address]   2023  1:00 PM DO LAVON López AMB

## 2023-11-24 RX ORDER — LACOSAMIDE 200 MG/1
200 TABLET ORAL 2 TIMES DAILY
Qty: 180 TABLET | Refills: 3 | Status: SHIPPED | OUTPATIENT
Start: 2023-11-24 | End: 2024-11-18

## 2023-11-27 ENCOUNTER — OFFICE VISIT (OUTPATIENT)
Dept: FAMILY MEDICINE CLINIC | Facility: CLINIC | Age: 53
End: 2023-11-27
Payer: MEDICARE

## 2023-11-27 VITALS
RESPIRATION RATE: 16 BRPM | SYSTOLIC BLOOD PRESSURE: 110 MMHG | OXYGEN SATURATION: 98 % | DIASTOLIC BLOOD PRESSURE: 62 MMHG | BODY MASS INDEX: 18.83 KG/M2 | TEMPERATURE: 98.6 F | HEIGHT: 67 IN | WEIGHT: 120 LBS | HEART RATE: 74 BPM

## 2023-11-27 DIAGNOSIS — E55.9 VITAMIN D DEFICIENCY, UNSPECIFIED: ICD-10-CM

## 2023-11-27 DIAGNOSIS — Z74.3 REQUIRES CONTINUOUS SUPERVISION FOR ACTIVITIES OF DAILY LIVING (ADL): ICD-10-CM

## 2023-11-27 DIAGNOSIS — Z93.1 G TUBE FEEDINGS (HCC): ICD-10-CM

## 2023-11-27 DIAGNOSIS — R56.9 SEIZURES (HCC): ICD-10-CM

## 2023-11-27 DIAGNOSIS — G80.8 OTHER CEREBRAL PALSY (HCC): Primary | ICD-10-CM

## 2023-11-27 DIAGNOSIS — E03.9 HYPOTHYROIDISM, UNSPECIFIED TYPE: ICD-10-CM

## 2023-11-27 PROCEDURE — 99214 OFFICE O/P EST MOD 30 MIN: CPT | Performed by: STUDENT IN AN ORGANIZED HEALTH CARE EDUCATION/TRAINING PROGRAM

## 2023-11-28 DIAGNOSIS — R56.9 CONVULSIONS, UNSPECIFIED CONVULSION TYPE (HCC): ICD-10-CM

## 2023-11-28 RX ORDER — LACOSAMIDE 200 MG/1
200 TABLET ORAL 2 TIMES DAILY
Qty: 180 TABLET | Refills: 3 | Status: SHIPPED | OUTPATIENT
Start: 2023-11-28 | End: 2024-11-22

## 2023-11-28 NOTE — TELEPHONE ENCOUNTER
Shanice Hoffman called on behalf of the pt stating that the mediation was sent to a location where they are no longer located. Medication needs to be sent to updated rx due to it being a controlled substance.  Please advise lacosamide (VIMPAT) 200 MG tablet

## 2023-12-04 ENCOUNTER — TELEPHONE (OUTPATIENT)
Dept: FAMILY MEDICINE CLINIC | Facility: CLINIC | Age: 53
End: 2023-12-04

## 2023-12-04 NOTE — TELEPHONE ENCOUNTER
Spoke w/Lizzie from pt's care team; pt missed one dose of thyroid medication, staff is not sure which day, they do know he had it on 12/1/2023 and today, 12/4/23; FYI.

## 2023-12-04 NOTE — TELEPHONE ENCOUNTER
Estelita Pastor called on behalf of the pt stating that he missed his dosage of levothyroxine (SYNTHROID) 25 MCG tablet.  Estelita Pastor stated that she can be reach if there is any additional orders

## 2023-12-06 ENCOUNTER — TELEPHONE (OUTPATIENT)
Dept: FAMILY MEDICINE CLINIC | Facility: CLINIC | Age: 53
End: 2023-12-06

## 2023-12-06 DIAGNOSIS — R56.9 CONVULSIONS, UNSPECIFIED CONVULSION TYPE (HCC): ICD-10-CM

## 2023-12-06 NOTE — TELEPHONE ENCOUNTER
[facility] called requesting pt get a refill of clonazePAM. Message sent to the clinical staff to further assist.

## 2023-12-06 NOTE — TELEPHONE ENCOUNTER
Last visit: 11/27/23  Next visit:   Future Appointments   Date Time Provider [address]   5/28/2024  1:30 PM Breanna cavazos DO [facility]     Last filled: 9/18/23; clonazepam 0.5 mg disintegrating tab; qty 10 w/no refills

## 2023-12-07 DIAGNOSIS — R56.9 CONVULSIONS, UNSPECIFIED CONVULSION TYPE (HCC): ICD-10-CM

## 2023-12-07 RX ORDER — CLONAZEPAM 0.5 MG/1
0.5 TABLET, ORALLY DISINTEGRATING ORAL
Qty: 10 TABLET | Refills: 0 | Status: SHIPPED | OUTPATIENT
Start: 2023-12-07 | End: 2023-12-07 | Stop reason: SDUPTHER

## 2023-12-07 RX ORDER — CLONAZEPAM 0.5 MG/1
0.5 TABLET, ORALLY DISINTEGRATING ORAL
Qty: 10 TABLET | Refills: 0 | Status: SHIPPED | OUTPATIENT
Start: 2023-12-07 | End: 2023-12-07

## 2023-12-07 NOTE — TELEPHONE ENCOUNTER
Nixon Amor from [address] called requesting this medication be sent to [facility] in [place]. Original was sent to [facility] but to the the controlled status the pharmacy is unable to transfer medication to [facility] ([place]). Medication in question: clonazePAM (Mayur Negro).

## 2024-03-20 DIAGNOSIS — R45.1 AGITATION REQUIRING SEDATION PROTOCOL: ICD-10-CM

## 2024-03-20 DIAGNOSIS — R56.9 CONVULSIONS, UNSPECIFIED CONVULSION TYPE (HCC): ICD-10-CM

## 2024-03-20 RX ORDER — CLONAZEPAM 0.5 MG/1
0.5 TABLET, ORALLY DISINTEGRATING ORAL
Qty: 10 TABLET | Refills: 0 | Status: SHIPPED | OUTPATIENT
Start: 2024-03-20 | End: 2024-03-20

## 2024-03-20 RX ORDER — DIAZEPAM 10 MG/1
TABLET ORAL
Qty: 4 TABLET | Refills: 0 | Status: SHIPPED | OUTPATIENT
Start: 2024-03-20 | End: 2024-06-18

## 2024-03-20 NOTE — TELEPHONE ENCOUNTER
Medication refill request    clonazePAM (KLONOPIN) 0.5 MG disintegrating tablet   Last filled: 12/07/2023  Take 1 tablet by mouth once as needed (siezure) for up to 1 dose. Max Daily Amount: 0.5 mg   RF: 0 Qty: 10    diazePAM (VALIUM) 10 MG tablet   Last filled: 09/18/2023  Sig: CRUSH 1 TABLET, MIX W/ WATER, AND GIVE VIA- G-TUBE 1 HOUR PRIOR TO EYE EXAM FOR ANXIETY   RF: 0  QTY: 4    Future Appointments   Date Time Provider Department Center   5/28/2024  1:30 PM Joon Granados DO [facility]

## 2024-03-20 NOTE — TELEPHONE ENCOUNTER
[place] [facility] called requesting refills to be sent.    Requested Prescriptions     Pending Prescriptions Disp Refills    clonazePAM (KLONOPIN) 0.5 MG disintegrating tablet 10 tablet 0     Sig: Take 1 tablet by mouth once as needed (siezure) for up to 1 dose. Max Daily Amount: 0.5 mg    diazePAM (VALIUM) 10 MG tablet 4 tablet 0     Sig: CRUSH 1 TABLET, MIX W/ WATER, AND GIVE VIA- G-TUBE 1 HOUR PRIOR TO EYE EXAM FOR ANXIETY

## 2024-05-28 ENCOUNTER — HOSPITAL ENCOUNTER (OUTPATIENT)
Facility: HOSPITAL | Age: 54
Setting detail: SPECIMEN
Discharge: HOME | End: 2024-05-31
Payer: MEDICARE

## 2024-05-28 ENCOUNTER — OFFICE VISIT (OUTPATIENT)
Dept: FAMILY MEDICINE CLINIC | Facility: CLINIC | Age: 54
End: 2024-05-28
Payer: MEDICARE

## 2024-05-28 VITALS
SYSTOLIC BLOOD PRESSURE: 110 MMHG | WEIGHT: 116 LBS | TEMPERATURE: 96.9 F | DIASTOLIC BLOOD PRESSURE: 80 MMHG | BODY MASS INDEX: 18.21 KG/M2 | HEIGHT: 67 IN | RESPIRATION RATE: 16 BRPM | OXYGEN SATURATION: 95 % | HEART RATE: 50 BPM

## 2024-05-28 DIAGNOSIS — G80.8 OTHER CEREBRAL PALSY (HCC): ICD-10-CM

## 2024-05-28 DIAGNOSIS — S82.842D CLOSED BIMALLEOLAR FRACTURE OF LEFT ANKLE WITH ROUTINE HEALING, SUBSEQUENT ENCOUNTER: ICD-10-CM

## 2024-05-28 DIAGNOSIS — E03.9 HYPOTHYROIDISM, UNSPECIFIED TYPE: ICD-10-CM

## 2024-05-28 DIAGNOSIS — R56.9 CONVULSIONS, UNSPECIFIED CONVULSION TYPE (HCC): ICD-10-CM

## 2024-05-28 DIAGNOSIS — Z93.1 G TUBE FEEDINGS (HCC): ICD-10-CM

## 2024-05-28 DIAGNOSIS — Z00.00 MEDICARE ANNUAL WELLNESS VISIT, SUBSEQUENT: ICD-10-CM

## 2024-05-28 DIAGNOSIS — Z00.00 MEDICARE ANNUAL WELLNESS VISIT, SUBSEQUENT: Primary | ICD-10-CM

## 2024-05-28 DIAGNOSIS — M80.80XD LOCALIZED OSTEOPOROSIS WITH CURRENT PATHOLOGICAL FRACTURE WITH ROUTINE HEALING, SUBSEQUENT ENCOUNTER: ICD-10-CM

## 2024-05-28 DIAGNOSIS — R56.9 SEIZURES (HCC): ICD-10-CM

## 2024-05-28 DIAGNOSIS — D69.6 THROMBOCYTOPENIA (HCC): ICD-10-CM

## 2024-05-28 LAB
25(OH)D3 SERPL-MCNC: 45.8 NG/ML (ref 30–100)
ALBUMIN SERPL-MCNC: 3.3 G/DL (ref 3.4–5)
ALBUMIN/GLOB SERPL: 1.2 (ref 0.8–1.7)
ALP SERPL-CCNC: 102 U/L (ref 45–117)
ALT SERPL-CCNC: 45 U/L (ref 16–61)
ANION GAP SERPL CALC-SCNC: 5 MMOL/L (ref 3–18)
AST SERPL-CCNC: 29 U/L (ref 10–38)
BASOPHILS # BLD: 0 K/UL (ref 0–0.1)
BASOPHILS NFR BLD: 1 % (ref 0–2)
BILIRUB SERPL-MCNC: 0.3 MG/DL (ref 0.2–1)
BUN SERPL-MCNC: 10 MG/DL (ref 7–18)
BUN/CREAT SERPL: 11 (ref 12–20)
CALCIUM SERPL-MCNC: 8.7 MG/DL (ref 8.5–10.1)
CHLORIDE SERPL-SCNC: 111 MMOL/L (ref 100–111)
CHOLEST SERPL-MCNC: 109 MG/DL
CO2 SERPL-SCNC: 28 MMOL/L (ref 21–32)
CREAT SERPL-MCNC: 0.89 MG/DL (ref 0.6–1.3)
DIFFERENTIAL METHOD BLD: NORMAL
EOSINOPHIL # BLD: 0.2 K/UL (ref 0–0.4)
EOSINOPHIL NFR BLD: 5 % (ref 0–5)
ERYTHROCYTE [DISTWIDTH] IN BLOOD BY AUTOMATED COUNT: 13.5 % (ref 11.6–14.5)
EST. AVERAGE GLUCOSE BLD GHB EST-MCNC: 91 MG/DL
GLOBULIN SER CALC-MCNC: 2.8 G/DL (ref 2–4)
GLUCOSE SERPL-MCNC: 116 MG/DL (ref 74–99)
HBA1C MFR BLD: 4.8 % (ref 4.2–5.6)
HCT VFR BLD AUTO: 44.5 % (ref 36–48)
HDLC SERPL-MCNC: 43 MG/DL (ref 40–60)
HDLC SERPL: 2.5 (ref 0–5)
HGB BLD-MCNC: 14.2 G/DL (ref 13–16)
IMM GRANULOCYTES # BLD AUTO: 0 K/UL (ref 0–0.04)
IMM GRANULOCYTES NFR BLD AUTO: 0 % (ref 0–0.5)
LDLC SERPL CALC-MCNC: 53 MG/DL (ref 0–100)
LIPID PANEL: NORMAL
LYMPHOCYTES # BLD: 1.3 K/UL (ref 0.9–3.6)
LYMPHOCYTES NFR BLD: 25 % (ref 21–52)
MCH RBC QN AUTO: 30.9 PG (ref 24–34)
MCHC RBC AUTO-ENTMCNC: 31.9 G/DL (ref 31–37)
MCV RBC AUTO: 96.9 FL (ref 78–100)
MONOCYTES # BLD: 0.4 K/UL (ref 0.05–1.2)
MONOCYTES NFR BLD: 8 % (ref 3–10)
NEUTS SEG # BLD: 3.2 K/UL (ref 1.8–8)
NEUTS SEG NFR BLD: 63 % (ref 40–73)
NRBC # BLD: 0 K/UL (ref 0–0.01)
NRBC BLD-RTO: 0 PER 100 WBC
PLATELET # BLD AUTO: 192 K/UL (ref 135–420)
PMV BLD AUTO: 11.3 FL (ref 9.2–11.8)
POTASSIUM SERPL-SCNC: 4.6 MMOL/L (ref 3.5–5.5)
PROT SERPL-MCNC: 6.1 G/DL (ref 6.4–8.2)
RBC # BLD AUTO: 4.59 M/UL (ref 4.35–5.65)
SODIUM SERPL-SCNC: 144 MMOL/L (ref 136–145)
TRIGL SERPL-MCNC: 65 MG/DL
TSH SERPL-ACNC: 1.24 UIU/ML (ref 0.36–3.74)
VLDLC SERPL CALC-MCNC: 13 MG/DL
WBC # BLD AUTO: 5.2 K/UL (ref 4.6–13.2)

## 2024-05-28 PROCEDURE — 80053 COMPREHEN METABOLIC PANEL: CPT

## 2024-05-28 PROCEDURE — 36415 COLL VENOUS BLD VENIPUNCTURE: CPT

## 2024-05-28 PROCEDURE — 83036 HEMOGLOBIN GLYCOSYLATED A1C: CPT

## 2024-05-28 PROCEDURE — G0439 PPPS, SUBSEQ VISIT: HCPCS | Performed by: STUDENT IN AN ORGANIZED HEALTH CARE EDUCATION/TRAINING PROGRAM

## 2024-05-28 PROCEDURE — 85025 COMPLETE CBC W/AUTO DIFF WBC: CPT

## 2024-05-28 PROCEDURE — 84443 ASSAY THYROID STIM HORMONE: CPT

## 2024-05-28 PROCEDURE — 82306 VITAMIN D 25 HYDROXY: CPT

## 2024-05-28 PROCEDURE — 80061 LIPID PANEL: CPT

## 2024-05-28 SDOH — ECONOMIC STABILITY: FOOD INSECURITY: WITHIN THE PAST 12 MONTHS, YOU WORRIED THAT YOUR FOOD WOULD RUN OUT BEFORE YOU GOT MONEY TO BUY MORE.: NEVER TRUE

## 2024-05-28 SDOH — ECONOMIC STABILITY: FOOD INSECURITY: WITHIN THE PAST 12 MONTHS, THE FOOD YOU BOUGHT JUST DIDN'T LAST AND YOU DIDN'T HAVE MONEY TO GET MORE.: NEVER TRUE

## 2024-05-28 SDOH — ECONOMIC STABILITY: INCOME INSECURITY: HOW HARD IS IT FOR YOU TO PAY FOR THE VERY BASICS LIKE FOOD, HOUSING, MEDICAL CARE, AND HEATING?: NOT HARD AT ALL

## 2024-05-28 ASSESSMENT — PATIENT HEALTH QUESTIONNAIRE - PHQ9
2. FEELING DOWN, DEPRESSED OR HOPELESS: NOT AT ALL
DEPRESSION UNABLE TO ASSESS: FUNCTIONAL CAPACITY MOTIVATION LIMITS ACCURACY
SUM OF ALL RESPONSES TO PHQ QUESTIONS 1-9: 0
SUM OF ALL RESPONSES TO PHQ QUESTIONS 1-9: 0
1. LITTLE INTEREST OR PLEASURE IN DOING THINGS: NOT AT ALL
SUM OF ALL RESPONSES TO PHQ9 QUESTIONS 1 & 2: 0
SUM OF ALL RESPONSES TO PHQ QUESTIONS 1-9: 0
SUM OF ALL RESPONSES TO PHQ QUESTIONS 1-9: 0

## 2024-05-28 NOTE — PATIENT INSTRUCTIONS
IU of vitamin D per day. It is possible to meet your calcium requirement with diet alone, but a vitamin D supplement is usually necessary to meet this goal.  When exposed to the sun, use a sunscreen that protects against both UVA and UVB radiation with an SPF of 30 or greater. Reapply every 2 to 3 hours or after sweating, drying off with a towel, or swimming.  Always wear a seat belt when traveling in a car. Always wear a helmet when riding a bicycle or motorcycle.

## 2024-05-28 NOTE — PROGRESS NOTES
Héctor Downing is a 53 y.o. male (: 1970) presenting to address:    Chief Complaint   Patient presents with    Medicare AWV       Vitals:    24 1320   BP: 110/80   Pulse: 50   Resp: 16   Temp: 96.9 °F (36.1 °C)   SpO2: 95%       \"Have you been to the ER, urgent care clinic since your last visit?  Hospitalized since your last visit?\"    YES - When: approximately 1 ago.  Where and Why: [facility].    “Have you seen or consulted any other health care providers outside of [facility] since your last visit?”    NO    “Have you had a colorectal cancer screening such as a colonoscopy/FIT/Cologuard?    NO    No colonoscopy on file  Date of last Cologuard: 2021  No FIT/FOBT on file   No flexible sigmoidoscopy on file            
Medicare Annual Wellness Visit    Héctor Downing is here for Medicare AWV    Assessment & Plan   Medicare annual wellness visit, subsequent  Recommendations for Preventive Services Due: see orders and patient instructions/AVS.  Recommended screening schedule for the next 5-10 years is provided to the patient in written form: see Patient Instructions/AVS.     Return in 6 months (on 11/28/2024).     Subjective     Patient's complete Health Risk Assessment and screening values have been reviewed and are found in Flowsheets. The following problems were reviewed today and where indicated follow up appointments were made and/or referrals ordered.    Positive Risk Factor Screenings with Interventions:    Fall Risk:  Do you feel unsteady or are you worried about falling? : (!) yes  2 or more falls in past year?: no  Fall with injury in past year?: (!) yes     Interventions:    Reviewed medications, home hazards, visual acuity, and co-morbidities that can increase risk for falls  See AVS for additional education material    Cognitive:   Clock Drawing Test (CDT): (!) Abnormal  Words recalled: 0 Words Recalled  Total Score: (!) 0  Total Score Interpretation: Abnormal Mini-Cog  Interventions:  See AVS for additional education material    Depression:  Depression Unable to Assess: Functional capacity motivation limits accuracy  PHQ-2 Score: 0  PHQ-9 Total Score: 0    Interpretation:  5-9 mild   10-14 moderate   15-19 moderately severe   20-27 severe            Activity, Diet, and Weight:               Body mass index is 18.17 kg/m². (!) Abnormal    Underweight Interventions:  Patient declines any further evaluation or treatment                     Objective   Vitals:    05/28/24 1320   BP: 110/80   Site: Left Upper Arm   Position: Sitting   Cuff Size: Medium Adult   Pulse: 50   Resp: 16   Temp: 96.9 °F (36.1 °C)   TempSrc: Temporal   SpO2: 95%   Weight: 52.6 kg (116 lb)   Height: 1.702 m (5' 7\")      Body mass index is 18.17 
not taking: Reported on 5/28/2024), Disp: , Rfl:     Review of Systems  /80 (Site: Left Upper Arm, Position: Sitting, Cuff Size: Medium Adult)   Pulse 50   Temp 96.9 °F (36.1 °C) (Temporal)   Resp 16   Ht 1.702 m (5' 7\")   Wt 52.6 kg (116 lb)   SpO2 95%   BMI 18.17 kg/m²     Physical Exam  Constitutional:       General: He is not in acute distress.     Appearance: Normal appearance. He is normal weight. He is not ill-appearing, toxic-appearing or diaphoretic.      Comments: Sitting in his wheelchair.  Nonverbal.  Does not respond to voice.  Moves head during exam.   HENT:      Head: Normocephalic and atraumatic.      Right Ear: Tympanic membrane, ear canal and external ear normal.      Left Ear: Tympanic membrane, ear canal and external ear normal.      Nose: Nose normal. No congestion or rhinorrhea.      Mouth/Throat:      Mouth: Mucous membranes are moist.      Pharynx: Oropharynx is clear. No posterior oropharyngeal erythema.   Eyes:      Extraocular Movements: Extraocular movements intact.      Conjunctiva/sclera: Conjunctivae normal.      Pupils: Pupils are equal, round, and reactive to light.   Cardiovascular:      Rate and Rhythm: Normal rate and regular rhythm.      Pulses: Normal pulses.      Heart sounds: Normal heart sounds. No murmur heard.     No gallop.   Pulmonary:      Effort: Pulmonary effort is normal. No respiratory distress.      Breath sounds: Normal breath sounds. No wheezing or rales.   Abdominal:      General: Abdomen is flat. Bowel sounds are normal. There is no distension.      Palpations: Abdomen is soft.      Tenderness: There is no abdominal tenderness. There is no guarding.   Musculoskeletal:         General: Normal range of motion.      Cervical back: Normal range of motion and neck supple.      Right lower leg: No edema.      Left lower leg: No edema.   Feet:      Left foot:      Skin integrity: Skin integrity normal.   Lymphadenopathy:      Cervical: No cervical

## 2024-06-11 DIAGNOSIS — R56.9 CONVULSIONS, UNSPECIFIED CONVULSION TYPE (HCC): ICD-10-CM

## 2024-06-11 RX ORDER — CLONAZEPAM 0.5 MG/1
0.5 TABLET, ORALLY DISINTEGRATING ORAL
Qty: 10 TABLET | Refills: 0 | Status: SHIPPED | OUTPATIENT
Start: 2024-06-11 | End: 2024-06-11

## 2024-06-11 NOTE — TELEPHONE ENCOUNTER
Last visit: 5/28/24  Next visit:   Future Appointments   Date Time Provider Department Center   12/2/2024 11:30 AM Joon Granados DO [facility]     Last filled: 3/20/24; klonopin 0.5 mg disintegrating tab; qty 10 w/no refills

## 2024-06-26 DIAGNOSIS — R56.9 CONVULSIONS, UNSPECIFIED CONVULSION TYPE (HCC): ICD-10-CM

## 2024-06-26 RX ORDER — LACOSAMIDE 200 MG/1
200 TABLET ORAL 2 TIMES DAILY
Qty: 180 TABLET | Refills: 3 | Status: SHIPPED | OUTPATIENT
Start: 2024-06-26 | End: 2025-06-21

## 2024-07-29 ENCOUNTER — TELEPHONE (OUTPATIENT)
Dept: FAMILY MEDICINE CLINIC | Facility: CLINIC | Age: 54
End: 2024-07-29

## 2024-07-29 NOTE — TELEPHONE ENCOUNTER
LM for staff at [facility], forms were signed by PCP and are ready for  at .

## 2024-11-08 ENCOUNTER — TELEPHONE (OUTPATIENT)
Facility: CLINIC | Age: 54
End: 2024-11-08

## 2024-11-09 NOTE — TELEPHONE ENCOUNTER
Caregiver called after hours pager to notify provider that the pt missed 3 out of the prescribed 6 zonisamide capsules x 4 days ago.

## 2024-12-02 ENCOUNTER — OFFICE VISIT (OUTPATIENT)
Dept: FAMILY MEDICINE CLINIC | Facility: CLINIC | Age: 54
End: 2024-12-02
Payer: MEDICARE

## 2024-12-02 VITALS — OXYGEN SATURATION: 93 % | HEIGHT: 67 IN | HEART RATE: 76 BPM | BODY MASS INDEX: 18.17 KG/M2

## 2024-12-02 DIAGNOSIS — G80.8 OTHER CEREBRAL PALSY (HCC): ICD-10-CM

## 2024-12-02 DIAGNOSIS — R56.9 CONVULSIONS, UNSPECIFIED CONVULSION TYPE (HCC): Primary | ICD-10-CM

## 2024-12-02 DIAGNOSIS — Z93.1 G TUBE FEEDINGS (HCC): ICD-10-CM

## 2024-12-02 PROCEDURE — 1036F TOBACCO NON-USER: CPT | Performed by: STUDENT IN AN ORGANIZED HEALTH CARE EDUCATION/TRAINING PROGRAM

## 2024-12-02 PROCEDURE — 99214 OFFICE O/P EST MOD 30 MIN: CPT | Performed by: STUDENT IN AN ORGANIZED HEALTH CARE EDUCATION/TRAINING PROGRAM

## 2024-12-02 PROCEDURE — G8484 FLU IMMUNIZE NO ADMIN: HCPCS | Performed by: STUDENT IN AN ORGANIZED HEALTH CARE EDUCATION/TRAINING PROGRAM

## 2024-12-02 PROCEDURE — 3017F COLORECTAL CA SCREEN DOC REV: CPT | Performed by: STUDENT IN AN ORGANIZED HEALTH CARE EDUCATION/TRAINING PROGRAM

## 2024-12-02 PROCEDURE — G8427 DOCREV CUR MEDS BY ELIG CLIN: HCPCS | Performed by: STUDENT IN AN ORGANIZED HEALTH CARE EDUCATION/TRAINING PROGRAM

## 2024-12-02 PROCEDURE — G8419 CALC BMI OUT NRM PARAM NOF/U: HCPCS | Performed by: STUDENT IN AN ORGANIZED HEALTH CARE EDUCATION/TRAINING PROGRAM

## 2024-12-02 SDOH — ECONOMIC STABILITY: FOOD INSECURITY: WITHIN THE PAST 12 MONTHS, YOU WORRIED THAT YOUR FOOD WOULD RUN OUT BEFORE YOU GOT MONEY TO BUY MORE.: NEVER TRUE

## 2024-12-02 SDOH — ECONOMIC STABILITY: FOOD INSECURITY: WITHIN THE PAST 12 MONTHS, THE FOOD YOU BOUGHT JUST DIDN'T LAST AND YOU DIDN'T HAVE MONEY TO GET MORE.: NEVER TRUE

## 2024-12-02 SDOH — ECONOMIC STABILITY: INCOME INSECURITY: HOW HARD IS IT FOR YOU TO PAY FOR THE VERY BASICS LIKE FOOD, HOUSING, MEDICAL CARE, AND HEATING?: NOT HARD AT ALL

## 2024-12-02 NOTE — PROGRESS NOTES
Héctor Downing is a 54 y.o. year old male who presents today for   Chief Complaint   Patient presents with    Follow-up       Is someone accompanying this pt? Yes, Gabriella De La Rosa    Is the patient using any DME equipment during OV? Power Chair    Depression Screenin/28/2024     1:28 PM 2023    12:55 PM 3/13/2023    10:02 AM 2021     2:12 PM   PHQ-9 Questionaire   Little interest or pleasure in doing things 0 0 0 0   Feeling down, depressed, or hopeless 0 0 0 0   PHQ-9 Total Score 0 0 0 0       Abuse Screening:       No data to display                Learning Assessment:  No question data found.    Fall Risk:      2024     1:24 PM 2023    12:54 PM   Fall Risk   Do you feel unsteady or are you worried about falling?  yes no   2 or more falls in past year? no no   Fall with injury in past year? yes no           Coordination of Care:   1. \"Have you been to the ER, urgent care clinic since your last visit?  Hospitalized since your last visit?\" no    2. \"Have you seen or consulted any other health care providers outside of the [facility] since your last visit?\" no    3. For patients aged 45-75: Has the patient had a colonoscopy / FIT/ Cologuard? no    If the patient is female:    4. For patients aged 40-74: Has the patient had a mammogram within the past 2 years? N/a    5. For patients aged 21-65: Has the patient had a pap smear? N/a    Health Maintenance: reviewed and discussed and ordered per Provider.    Health Maintenance Due   Topic Date Due    HIV screen  Never done    Hepatitis B vaccine (1 of 3 - 19+ 3-dose series) Never done    DTaP/Tdap/Td vaccine (1 - Tdap) Never done    Flu vaccine (1) 2024    COVID-19 Vaccine (2023- season) 2024        -GAMALIEL PETTIT   [facility]        Click Here for Release of Records Request  
his p.r.n. Klonopin nasal spray. No changes to his medications.  Doing well.  At baseline.    2. Ankle fracture.  His ankle, which was previously broken, has healed. He was discharged by the orthopedic last week after wearing a cast and then a boot for the last 2 months.    3. Health Maintenance.  An influenza vaccine is recommended, but it is currently unavailable at the clinic. He may need to get it from a pharmacy.    Follow-up  Return in 6 months for follow up.    Plan of care has been discussed, patient agrees with plan; all questions answered.   More than 50% of the time spent in this visit was counseling the patient about  illness and treatment options         Joon Granados, DO  Family Medicine  [facility]       PLEASE NOTE:   This document has been produced using voice recognition software. Unrecognized errors in transcription may be present.

## 2025-01-10 ENCOUNTER — TELEPHONE (OUTPATIENT)
Dept: FAMILY MEDICINE CLINIC | Facility: CLINIC | Age: 55
End: 2025-01-10

## 2025-01-10 NOTE — TELEPHONE ENCOUNTER
Clinical staff called pt to advise provider is out of office and they will need to reschedule their appt.

## 2025-01-13 ENCOUNTER — OFFICE VISIT (OUTPATIENT)
Dept: FAMILY MEDICINE CLINIC | Facility: CLINIC | Age: 55
End: 2025-01-13
Payer: MEDICARE

## 2025-01-13 VITALS
DIASTOLIC BLOOD PRESSURE: 80 MMHG | OXYGEN SATURATION: 93 % | BODY MASS INDEX: 18.17 KG/M2 | HEART RATE: 62 BPM | RESPIRATION RATE: 14 BRPM | HEIGHT: 67 IN | TEMPERATURE: 98 F | SYSTOLIC BLOOD PRESSURE: 126 MMHG

## 2025-01-13 DIAGNOSIS — Z93.1 G TUBE FEEDINGS (HCC): ICD-10-CM

## 2025-01-13 DIAGNOSIS — R56.9 CONVULSIONS, UNSPECIFIED CONVULSION TYPE (HCC): ICD-10-CM

## 2025-01-13 DIAGNOSIS — G80.8 OTHER CEREBRAL PALSY (HCC): ICD-10-CM

## 2025-01-13 DIAGNOSIS — S62.524D NONDISPLACED FRACTURE OF DISTAL PHALANX OF RIGHT THUMB, SUBSEQUENT ENCOUNTER FOR FRACTURE WITH ROUTINE HEALING: Primary | ICD-10-CM

## 2025-01-13 PROCEDURE — 3017F COLORECTAL CA SCREEN DOC REV: CPT | Performed by: STUDENT IN AN ORGANIZED HEALTH CARE EDUCATION/TRAINING PROGRAM

## 2025-01-13 PROCEDURE — 99214 OFFICE O/P EST MOD 30 MIN: CPT | Performed by: STUDENT IN AN ORGANIZED HEALTH CARE EDUCATION/TRAINING PROGRAM

## 2025-01-13 PROCEDURE — G8427 DOCREV CUR MEDS BY ELIG CLIN: HCPCS | Performed by: STUDENT IN AN ORGANIZED HEALTH CARE EDUCATION/TRAINING PROGRAM

## 2025-01-13 PROCEDURE — G8419 CALC BMI OUT NRM PARAM NOF/U: HCPCS | Performed by: STUDENT IN AN ORGANIZED HEALTH CARE EDUCATION/TRAINING PROGRAM

## 2025-01-13 PROCEDURE — 1036F TOBACCO NON-USER: CPT | Performed by: STUDENT IN AN ORGANIZED HEALTH CARE EDUCATION/TRAINING PROGRAM

## 2025-01-13 RX ORDER — MIDAZOLAM 5 MG/.1ML
5 SPRAY NASAL PRN
COMMUNITY
Start: 2024-07-30

## 2025-01-13 RX ORDER — LORATADINE ORAL 5 MG/5ML
5 SOLUTION ORAL DAILY
COMMUNITY
Start: 2025-01-08

## 2025-01-13 SDOH — ECONOMIC STABILITY: FOOD INSECURITY: WITHIN THE PAST 12 MONTHS, THE FOOD YOU BOUGHT JUST DIDN'T LAST AND YOU DIDN'T HAVE MONEY TO GET MORE.: NEVER TRUE

## 2025-01-13 SDOH — ECONOMIC STABILITY: FOOD INSECURITY: WITHIN THE PAST 12 MONTHS, YOU WORRIED THAT YOUR FOOD WOULD RUN OUT BEFORE YOU GOT MONEY TO BUY MORE.: NEVER TRUE

## 2025-01-13 ASSESSMENT — PATIENT HEALTH QUESTIONNAIRE - PHQ9
SUM OF ALL RESPONSES TO PHQ QUESTIONS 1-9: 0
SUM OF ALL RESPONSES TO PHQ QUESTIONS 1-9: 0
1. LITTLE INTEREST OR PLEASURE IN DOING THINGS: NOT AT ALL
SUM OF ALL RESPONSES TO PHQ QUESTIONS 1-9: 0
SUM OF ALL RESPONSES TO PHQ QUESTIONS 1-9: 0
SUM OF ALL RESPONSES TO PHQ9 QUESTIONS 1 & 2: 0
2. FEELING DOWN, DEPRESSED OR HOPELESS: NOT AT ALL

## 2025-01-13 NOTE — PROGRESS NOTES
Héctor Downing is a 54 y.o. male (: 1970) presenting to address:    Chief Complaint   Patient presents with    Finger Injury     Right thumb fracture       Vitals:    25 0954   BP: 126/80   Pulse: 62   Resp: 14   Temp: 98 °F (36.7 °C)   SpO2: 93%       \"Have you been to the ER, urgent care clinic since your last visit?  Hospitalized since your last visit?\"    YES - When: approximately 13 days ago.  Where and Why: thumb fracture, [facility].    “Have you seen or consulted any other health care providers outside of [facility] since your last visit?”    NO

## 2025-01-13 NOTE — PROGRESS NOTES
[facility]      MR#: [id number]    HISTORY OF PRESENT ILLNESS  History of Present Illness  The patient is a 54-year-old male who presents for follow-up for a thumb fracture.    History is reported by other person in the presence of the patient.  On 2025, the accompanying adult female reports that the staff observed significant swelling and discoloration of his right thumb when they attempted to assist him in getting up for the day. The patient has not experienced any recent falls or trauma to the area. He has been nonambulatory since the incident. The accompanying adult female suspects a possible seizure event due to his habit of holding his hand close to his body. He has not reported any pain. The patient has been compliant with his vitamin D supplementation. The accompanying adult female also notes that he has been holding his thumb in a manner consistent with his usual behavior. He was referred to orthopedics but was unable to secure an appointment due to his Medicaid insurance. The patient has been managing the condition with Tylenol since the previous Wednesday, Thursday, and Friday, but has not required any medication since then. This is his second fracture within a year.    MEDICATIONS  Current: Vitamin D, Tylenol    Past medical history:  Cerebral palsy  Acquired hypothyroidism  Scoliosis  Thrombocytopenia  Seizures  Osteoporosis  Left jonel ankle fracture      Social:  Tobacco use: None  Alcohol use: None  Illicit drug use: None  Housing: Group home  Employment: Disabled     Health maintenance:  Colon cancer screenin2024  Prostate cancer screening: Due 2024  COVID: UTD  Influenza: UTD      Current Outpatient Medications:     midazolam (NAYZILAM) 5 MG/0.1ML SOLN nasal spray, 0.1 mLs by Nasal route as needed, Disp: , Rfl:     loratadine (CLARITIN) 5 MG/5ML solution, 5 mLs by Per G Tube route daily, Disp: , Rfl:     lacosamide (VIMPAT) 200 MG tablet, Take 1 tablet by mouth 2

## 2025-01-17 DIAGNOSIS — R56.9 CONVULSIONS, UNSPECIFIED CONVULSION TYPE (HCC): ICD-10-CM

## 2025-01-17 RX ORDER — LACOSAMIDE 200 MG/1
200 TABLET ORAL 2 TIMES DAILY
Qty: 180 TABLET | Refills: 3 | Status: SHIPPED | OUTPATIENT
Start: 2025-01-17 | End: 2026-01-12

## 2025-01-31 ENCOUNTER — TELEPHONE (OUTPATIENT)
Dept: FAMILY MEDICINE CLINIC | Facility: CLINIC | Age: 55
End: 2025-01-31

## 2025-01-31 NOTE — TELEPHONE ENCOUNTER
Pt's care manager called inquiring if his medical forms had been completed. She can be reached at [phone].

## 2025-02-03 NOTE — TELEPHONE ENCOUNTER
Received form today, PCP will sign and will fax back to [employer]; informed pt's care manager, Lizzie; she voiced understanding.

## 2025-03-27 DIAGNOSIS — R45.1 AGITATION REQUIRING SEDATION PROTOCOL: ICD-10-CM

## 2025-03-27 RX ORDER — DIAZEPAM 10 MG/1
TABLET ORAL
Qty: 4 TABLET | Refills: 0 | Status: SHIPPED | OUTPATIENT
Start: 2025-03-27 | End: 2025-06-25

## 2025-06-03 ENCOUNTER — OFFICE VISIT (OUTPATIENT)
Dept: FAMILY MEDICINE CLINIC | Facility: CLINIC | Age: 55
End: 2025-06-03
Payer: MEDICARE

## 2025-06-03 ENCOUNTER — HOSPITAL ENCOUNTER (OUTPATIENT)
Facility: HOSPITAL | Age: 55
Setting detail: SPECIMEN
Discharge: HOME | End: 2025-06-06
Payer: MEDICARE

## 2025-06-03 VITALS
DIASTOLIC BLOOD PRESSURE: 80 MMHG | HEIGHT: 67 IN | TEMPERATURE: 98.4 F | OXYGEN SATURATION: 90 % | SYSTOLIC BLOOD PRESSURE: 122 MMHG | HEART RATE: 78 BPM | BODY MASS INDEX: 19.98 KG/M2 | RESPIRATION RATE: 14 BRPM | WEIGHT: 127.3 LBS

## 2025-06-03 DIAGNOSIS — Z00.00 MEDICARE ANNUAL WELLNESS VISIT, SUBSEQUENT: ICD-10-CM

## 2025-06-03 DIAGNOSIS — Z00.00 MEDICARE ANNUAL WELLNESS VISIT, SUBSEQUENT: Primary | ICD-10-CM

## 2025-06-03 DIAGNOSIS — E55.9 VITAMIN D DEFICIENCY, UNSPECIFIED: ICD-10-CM

## 2025-06-03 DIAGNOSIS — D69.6 THROMBOCYTOPENIA: ICD-10-CM

## 2025-06-03 DIAGNOSIS — R56.9 CONVULSIONS, UNSPECIFIED CONVULSION TYPE (HCC): ICD-10-CM

## 2025-06-03 DIAGNOSIS — E03.9 HYPOTHYROIDISM, UNSPECIFIED TYPE: ICD-10-CM

## 2025-06-03 DIAGNOSIS — L03.311 CELLULITIS OF ABDOMINAL WALL: ICD-10-CM

## 2025-06-03 DIAGNOSIS — K94.20 COMPLICATION OF GASTROSTOMY TUBE (HCC): ICD-10-CM

## 2025-06-03 DIAGNOSIS — Z93.1 G TUBE FEEDINGS (HCC): ICD-10-CM

## 2025-06-03 LAB
25(OH)D3 SERPL-MCNC: 45.4 NG/ML (ref 30–100)
ALBUMIN SERPL-MCNC: 3.3 G/DL (ref 3.4–5)
ALBUMIN/GLOB SERPL: 1.2 (ref 0.8–1.7)
ALP SERPL-CCNC: 96 U/L (ref 45–117)
ALT SERPL-CCNC: 17 U/L (ref 10–50)
ANION GAP SERPL CALC-SCNC: 9 MMOL/L (ref 3–18)
AST SERPL-CCNC: 18 U/L (ref 10–38)
BASOPHILS # BLD: 0.04 K/UL (ref 0–0.1)
BASOPHILS NFR BLD: 0.8 % (ref 0–2)
BILIRUB SERPL-MCNC: 0.2 MG/DL (ref 0.2–1)
BUN SERPL-MCNC: 9 MG/DL (ref 6–23)
BUN/CREAT SERPL: 10 (ref 12–20)
CALCIUM SERPL-MCNC: 9.5 MG/DL (ref 8.5–10.1)
CHLORIDE SERPL-SCNC: 110 MMOL/L (ref 98–107)
CHOLEST SERPL-MCNC: 121 MG/DL
CO2 SERPL-SCNC: 25 MMOL/L (ref 21–32)
CREAT SERPL-MCNC: 0.92 MG/DL (ref 0.6–1.3)
DIFFERENTIAL METHOD BLD: ABNORMAL
EOSINOPHIL # BLD: 0.21 K/UL (ref 0–0.4)
EOSINOPHIL NFR BLD: 4.3 % (ref 0–5)
ERYTHROCYTE [DISTWIDTH] IN BLOOD BY AUTOMATED COUNT: 13.2 % (ref 11.6–14.5)
EST. AVERAGE GLUCOSE BLD GHB EST-MCNC: 100 MG/DL
GLOBULIN SER CALC-MCNC: 2.8 G/DL (ref 2–4)
GLUCOSE SERPL-MCNC: 105 MG/DL (ref 74–108)
HBA1C MFR BLD: 5.1 % (ref 4.2–5.6)
HCT VFR BLD AUTO: 47.6 % (ref 36–48)
HDLC SERPL-MCNC: 52 MG/DL (ref 40–60)
HDLC SERPL: 2.3 (ref 0–5)
HGB BLD-MCNC: 15.2 G/DL (ref 13–16)
IMM GRANULOCYTES # BLD AUTO: 0.01 K/UL (ref 0–0.04)
IMM GRANULOCYTES NFR BLD AUTO: 0.2 % (ref 0–0.5)
LDLC SERPL CALC-MCNC: 61 MG/DL (ref 0–100)
LYMPHOCYTES # BLD: 1.43 K/UL (ref 0.9–3.6)
LYMPHOCYTES NFR BLD: 29.5 % (ref 21–52)
MCH RBC QN AUTO: 30.8 PG (ref 24–34)
MCHC RBC AUTO-ENTMCNC: 31.9 G/DL (ref 31–37)
MCV RBC AUTO: 96.6 FL (ref 78–100)
MONOCYTES # BLD: 0.28 K/UL (ref 0.05–1.2)
MONOCYTES NFR BLD: 5.8 % (ref 3–10)
NEUTS SEG # BLD: 2.88 K/UL (ref 1.8–8)
NEUTS SEG NFR BLD: 59.4 % (ref 40–73)
NRBC # BLD: 0 K/UL (ref 0–0.01)
NRBC BLD-RTO: 0 PER 100 WBC
PLATELET # BLD AUTO: 141 K/UL (ref 135–420)
PMV BLD AUTO: 12.3 FL (ref 9.2–11.8)
POTASSIUM SERPL-SCNC: 4.4 MMOL/L (ref 3.5–5.5)
PROT SERPL-MCNC: 6.1 G/DL (ref 6.4–8.2)
RBC # BLD AUTO: 4.93 M/UL (ref 4.35–5.65)
SODIUM SERPL-SCNC: 144 MMOL/L (ref 136–145)
TRIGL SERPL-MCNC: 39 MG/DL (ref 0–150)
TSH W FREE THYROID IF ABNORMAL: 1.33 UIU/ML (ref 0.27–4.2)
VLDLC SERPL CALC-MCNC: 8 MG/DL
WBC # BLD AUTO: 4.9 K/UL (ref 4.6–13.2)

## 2025-06-03 PROCEDURE — 80053 COMPREHEN METABOLIC PANEL: CPT

## 2025-06-03 PROCEDURE — 82306 VITAMIN D 25 HYDROXY: CPT

## 2025-06-03 PROCEDURE — 84443 ASSAY THYROID STIM HORMONE: CPT

## 2025-06-03 PROCEDURE — 80061 LIPID PANEL: CPT

## 2025-06-03 PROCEDURE — 36415 COLL VENOUS BLD VENIPUNCTURE: CPT

## 2025-06-03 PROCEDURE — 83036 HEMOGLOBIN GLYCOSYLATED A1C: CPT

## 2025-06-03 PROCEDURE — G0439 PPPS, SUBSEQ VISIT: HCPCS | Performed by: STUDENT IN AN ORGANIZED HEALTH CARE EDUCATION/TRAINING PROGRAM

## 2025-06-03 PROCEDURE — 3017F COLORECTAL CA SCREEN DOC REV: CPT | Performed by: STUDENT IN AN ORGANIZED HEALTH CARE EDUCATION/TRAINING PROGRAM

## 2025-06-03 PROCEDURE — 85025 COMPLETE CBC W/AUTO DIFF WBC: CPT

## 2025-06-03 RX ORDER — MUPIROCIN 20 MG/G
OINTMENT TOPICAL
Qty: 30 G | Refills: 1 | Status: SHIPPED | OUTPATIENT
Start: 2025-06-03 | End: 2025-06-10

## 2025-06-03 RX ORDER — LEVOTHYROXINE SODIUM 25 UG/1
25 TABLET ORAL DAILY
Qty: 90 TABLET | Refills: 3 | Status: SHIPPED | OUTPATIENT
Start: 2025-06-03

## 2025-06-03 RX ORDER — AD PREVENT ORIGINAL 15.5; 53.4 G/100G; G/100G
OINTMENT TOPICAL AS NEEDED
COMMUNITY
Start: 2025-03-27

## 2025-06-03 RX ORDER — BACLOFEN 20 MG/1
20 TABLET ORAL 3 TIMES DAILY
Qty: 90 TABLET | Refills: 5 | Status: SHIPPED | OUTPATIENT
Start: 2025-06-03

## 2025-06-03 RX ORDER — CLONAZEPAM 0.5 MG/1
0.5 TABLET, ORALLY DISINTEGRATING ORAL
Qty: 10 TABLET | Refills: 0 | Status: SHIPPED | OUTPATIENT
Start: 2025-06-03 | End: 2026-06-03

## 2025-06-03 RX ORDER — FOLIC ACID-PYRIDOXINE-CYANOCOBALAMIN TAB 2.5-25-2 MG 2.5-25-2 MG
1 TAB ORAL 2 TIMES DAILY
Qty: 90 TABLET | Refills: 3 | Status: SHIPPED | OUTPATIENT
Start: 2025-06-03

## 2025-06-03 ASSESSMENT — LIFESTYLE VARIABLES
HOW MANY STANDARD DRINKS CONTAINING ALCOHOL DO YOU HAVE ON A TYPICAL DAY: PATIENT UNABLE TO ANSWER
HOW OFTEN DO YOU HAVE A DRINK CONTAINING ALCOHOL: PATIENT UNABLE TO ANSWER

## 2025-06-03 ASSESSMENT — PATIENT HEALTH QUESTIONNAIRE - PHQ9: DEPRESSION UNABLE TO ASSESS: FUNCTIONAL CAPACITY MOTIVATION LIMITS ACCURACY

## 2025-06-03 NOTE — PROGRESS NOTES
[facility]      MR#: [id number]    HISTORY OF PRESENT ILLNESS  History of Present Illness  The patient is a 54-year-old male who presents for his Medicare wellness visit. He has a history of cerebral palsy, hypothyroidism, seizure disorder, and recently had a right finger fracture and open wound on his finger, which required hand surgery.    He has been under the care of Dr. Marguertie Mo for his hand condition, which has shown significant improvement. The wound was severe, necessitating the use of hydrogel and imaging studies.    He has been experiencing issues with his G-tube site, which appears raw and irritated. It is suspected that the tube may be too tight and in close proximity to his skin. He is scheduled to see a gastroenterologist at the end of the month. The original tube was replaced, and a request has been made to revert to the regular tube instead of the KARL-KEY tube. The current treatment plan includes the application of triple antibiotic ointment or Calmoseptine.    He has experienced 4 seizures in the past 30 days, averaging about 3 per month. His last visit was in 2025. He is currently on Klonopin as needed for seizures.    He is on a regimen of levothyroxine 25 mcg, baclofen, Folbic twice daily, and calcitonin nasal spray, administered as one spray alternating nostrils daily.    Past medical history:  Cerebral palsy  Acquired hypothyroidism  Scoliosis  Thrombocytopenia  Seizures  Osteoporosis  Left jonel ankle fracture      Social:  Tobacco use: None  Alcohol use: None  Illicit drug use: None  Housing: Group home  Employment: Disabled     Health maintenance:  Colon cancer screenin2024  Prostate cancer screening: Due 2024  COVID: UTD  Influenza: UTD      Current Outpatient Medications:     A+D PREVENT ORIGINAL 15.5-53.4 % ointment, Apply topically as needed, Disp: , Rfl:     mupirocin (BACTROBAN) 2 % ointment, Apply topically 3 times daily., Disp: 30 g, Rfl: 1

## 2025-06-03 NOTE — PROGRESS NOTES
Héctor Downing is a 54 y.o. male (: 1970) presenting to address:    Chief Complaint   Patient presents with    Medicare AWV       Vitals:    25 1014   BP: 122/80   Pulse: 78   Resp: 14   Temp: 98.4 °F (36.9 °C)   SpO2: 90%       \"Have you been to the ER, urgent care clinic since your last visit?  Hospitalized since your last visit?\"    NO    “Have you seen or consulted any other health care providers outside of [facility] since your last visit?”    YES - When: approximately 4 months ago.  Where and Why: ortho.

## 2025-06-03 NOTE — PROGRESS NOTES
Medicare Annual Wellness Visit    Héctor Downing is here for Medicare AWV    Assessment & Plan   Medicare annual wellness visit, subsequent     No follow-ups on file.     Subjective     Patient's complete Health Risk Assessment and screening values have been reviewed and are found in Flowsheets. The following problems were reviewed today and where indicated follow up appointments were made and/or referrals ordered.    Positive Risk Factor Screenings with Interventions:      Depression:  Depression Unable to Assess: Functional capacity motivation limits accuracy                      Advanced Directives:  Do you have a Living Will?: (!) No    Intervention:  has NO advanced directive - information provided               Objective   Vitals:    06/03/25 1014   BP: 122/80   BP Site: Right Upper Arm   Patient Position: Sitting   BP Cuff Size: Small Adult   Pulse: 78   Resp: 14   Temp: 98.4 °F (36.9 °C)   TempSrc: Temporal   SpO2: 90%   Weight: 57.7 kg (127 lb 4.8 oz)   Height: 1.702 m (5' 7\")      Body mass index is 19.94 kg/m².                   Allergies   Allergen Reactions    Lidocaine Other (See Comments)    Moxifloxacin Seizure    Pregabalin Other (See Comments)    Valproic Acid Other (See Comments)    Lamotrigine Rash     Prior to Visit Medications    Medication Sig Taking? Authorizing Provider   A+D PREVENT ORIGINAL 15.5-53.4 % ointment Apply topically as needed Yes Iqra Haley MD   diazePAM (VALIUM) 10 MG tablet CRUSH 1 TABLET, MIX W/ WATER, AND GIVE VIA- G-TUBE 1 HOUR PRIOR TO EYE EXAM FOR ANXIETY Yes Joon Granados S, DO   midazolam (NAYZILAM) 5 MG/0.1ML SOLN nasal spray 0.1 mLs by Nasal route as needed Yes Iqra Haley MD   loratadine (CLARITIN) 5 MG/5ML solution 5 mLs by Per G Tube route daily Yes Iqra Haley MD   Calcium Carb-Cholecalciferol (OYSTER SHELL CALCIUM W/D) 500-5 MG-MCG TABS tablet Take 1 tablet by mouth 2 times daily Yes Iqra Haley MD   IRON, FERROUS

## 2025-06-04 ENCOUNTER — RESULTS FOLLOW-UP (OUTPATIENT)
Dept: FAMILY MEDICINE CLINIC | Facility: CLINIC | Age: 55
End: 2025-06-04

## 2025-06-04 ENCOUNTER — TELEPHONE (OUTPATIENT)
Dept: FAMILY MEDICINE CLINIC | Facility: CLINIC | Age: 55
End: 2025-06-04

## 2025-07-17 NOTE — TELEPHONE ENCOUNTER
Comprehensive Nutrition Assessment    Type and Reason for Visit:  Initial, LOS    Nutrition Recommendations/Plan:   Initiate means of nutrition within 24-48 hours   Monitor weight, intakes, fluid status, labs, and bowel fxn      Malnutrition Assessment:  Malnutrition Status:  Severe malnutrition (07/17/25 1548)    Context:  Acute Illness     Findings of the 6 clinical characteristics of malnutrition:  Energy Intake:  50% or less of estimated energy requirements for 5 or more days  Weight Loss:  Greater than 2% over 1 week     Body Fat Loss:  No body fat loss     Muscle Mass Loss:  Moderate muscle mass loss Temples (temporalis), Hand (interosseous)  Fluid Accumulation:  Mild Generalized   Strength:  Not Performed    Nutrition Assessment:    Presented w/ hyponatremia, c/f UTI. Pt seen for LOS. NPO since 4/17 per SLP recs; was to receive EGD today, but not candidate d/t cardiac dysfunction. Family had refused PEG/NGT. Messaged MD for likely POC. Pt wt down 9.1 kg w/ -6L per I/Os, est 3% true wt loss if fluid trend accurate - severe. Labs and meds reviewed.    Nutrition Related Findings:    NFPE w/ mild-mod muscle wasting noted. No doc n/v/d/c, LBM 7/14. +1 gen edema. Wound Type: None       Current Nutrition Intake & Therapies:    Average Meal Intake: NPO  Average Supplements Intake: NPO  Diet NPO    Anthropometric Measures:  Height: 162.6 cm (5' 4\")  Ideal Body Weight (IBW): 120 lbs (55 kg)    Admission Body Weight: 93.2 kg (205 lb 7.5 oz)  Current Body Weight: 84.1 kg (185 lb 6.5 oz),   IBW. Weight Source: Bed scale  Current BMI (kg/m2): 31.8  Usual Body Weight: 90.2 kg (198 lb 13.7 oz)  % Weight Change (Calculated): -6.8  Weight Adjustment For: No Adjustment  BMI Categories: Obese Class 1 (BMI 30.0-34.9)    Estimated Daily Nutrient Needs:  Energy Requirements Based On: Formula  Weight Used for Energy Requirements: Current  Energy (kcal/day): 1865-8202 (MSJ 1.2/1.1-1.2)  Weight Used for Protein Requirements:  Pt's medication list form has been signed off on and is ready for pickup. Care taker made aware.

## 2025-08-19 DIAGNOSIS — R56.9 CONVULSIONS, UNSPECIFIED CONVULSION TYPE (HCC): ICD-10-CM

## 2025-08-19 RX ORDER — LACOSAMIDE 200 MG/1
200 TABLET ORAL 2 TIMES DAILY
Qty: 180 TABLET | Refills: 3 | Status: SHIPPED | OUTPATIENT
Start: 2025-08-19 | End: 2026-08-14